# Patient Record
Sex: MALE | Race: WHITE | NOT HISPANIC OR LATINO | Employment: OTHER | URBAN - METROPOLITAN AREA
[De-identification: names, ages, dates, MRNs, and addresses within clinical notes are randomized per-mention and may not be internally consistent; named-entity substitution may affect disease eponyms.]

---

## 2017-02-06 ENCOUNTER — PATIENT MESSAGE (OUTPATIENT)
Dept: RESEARCH | Facility: HOSPITAL | Age: 57
End: 2017-02-06

## 2017-02-10 ENCOUNTER — TELEPHONE (OUTPATIENT)
Dept: PULMONOLOGY | Facility: CLINIC | Age: 57
End: 2017-02-10

## 2017-02-12 RX ORDER — ESCITALOPRAM OXALATE 20 MG/1
TABLET ORAL
Qty: 90 TABLET | Refills: 0 | Status: SHIPPED | OUTPATIENT
Start: 2017-02-12 | End: 2017-04-23 | Stop reason: SDUPTHER

## 2017-02-12 RX ORDER — BUPROPION HYDROCHLORIDE 150 MG/1
TABLET ORAL
Qty: 90 TABLET | Refills: 0 | Status: SHIPPED | OUTPATIENT
Start: 2017-02-12 | End: 2017-04-23 | Stop reason: SDUPTHER

## 2017-02-13 ENCOUNTER — OFFICE VISIT (OUTPATIENT)
Dept: PULMONOLOGY | Facility: CLINIC | Age: 57
End: 2017-02-13
Payer: OTHER GOVERNMENT

## 2017-02-13 VITALS
HEIGHT: 76 IN | BODY MASS INDEX: 36.03 KG/M2 | HEART RATE: 62 BPM | DIASTOLIC BLOOD PRESSURE: 84 MMHG | SYSTOLIC BLOOD PRESSURE: 124 MMHG | WEIGHT: 295.88 LBS | RESPIRATION RATE: 18 BRPM | OXYGEN SATURATION: 98 %

## 2017-02-13 DIAGNOSIS — G47.33 OSA (OBSTRUCTIVE SLEEP APNEA): Primary | ICD-10-CM

## 2017-02-13 PROCEDURE — 99213 OFFICE O/P EST LOW 20 MIN: CPT | Mod: PBBFAC,PO | Performed by: NURSE PRACTITIONER

## 2017-02-13 PROCEDURE — 99213 OFFICE O/P EST LOW 20 MIN: CPT | Mod: S$PBB,,, | Performed by: NURSE PRACTITIONER

## 2017-02-13 PROCEDURE — 99999 PR PBB SHADOW E&M-EST. PATIENT-LVL III: CPT | Mod: PBBFAC,,, | Performed by: NURSE PRACTITIONER

## 2017-02-13 NOTE — PROGRESS NOTES
"Subjective:      Patient ID: Gopi Garcia is a 56 y.o. male.    Chief Complaint: Sleep Apnea    HPI Comments: Presents to office for review of AutoPAP therapy. Patient states improved symptoms with use of AutoPAP. Sleeping more soundly. Waking up feeling more refreshed. Improved daytime sleepiness. Patient states he is benefiting from use of the AutoPAP.       Visit Vitals    /84    Pulse 62    Resp 18    Ht 6' 4" (1.93 m)    Wt 134.2 kg (295 lb 13.7 oz)    SpO2 98%    BMI 36.01 kg/m2     Body mass index is 36.01 kg/(m^2).    Review of Systems   Constitutional: Negative.    HENT: Negative.    Respiratory: Negative.    Cardiovascular: Negative.    Musculoskeletal: Negative.    Gastrointestinal: Negative.    Neurological: Negative.    Psychiatric/Behavioral: Negative.      Objective:      Physical Exam   Constitutional: He is oriented to person, place, and time. He appears well-developed and well-nourished.   HENT:   Head: Normocephalic and atraumatic.   Nose: Nose normal.   Mouth/Throat: Uvula is midline and oropharynx is clear and moist.   Neck: Trachea normal and normal range of motion. Neck supple. No thyroid mass and no thyromegaly present.   Cardiovascular: Normal rate, regular rhythm and normal heart sounds.    Pulmonary/Chest: Effort normal and breath sounds normal. He has no wheezes. He has no rhonchi. He has no rales. Chest wall is not dull to percussion.   Abdominal: Soft. He exhibits no mass. There is no hepatosplenomegaly or splenomegaly.   Musculoskeletal: Normal range of motion. He exhibits no edema.   Neurological: He is alert and oriented to person, place, and time.   Skin: Skin is warm and dry.   Psychiatric: He has a normal mood and affect.     Personal Diagnostic Review  Autopap download: Patient wears on average 7 hrs and 37 minutes. Greater than 4 hrs 100 % of the time. 90% percentile pressure 10 with AHI 3.4 events/hr    Assessment:       1. DASHA (obstructive sleep apnea)     "    Outpatient Encounter Prescriptions as of 2/13/2017   Medication Sig Dispense Refill    aspirin (ECOTRIN) 81 MG EC tablet Take 81 mg by mouth once daily.      atorvastatin (LIPITOR) 40 MG tablet Take 1 tablet (40 mg total) by mouth every evening. 90 tablet 2    buPROPion (WELLBUTRIN XL) 150 MG TB24 tablet TAKE 1 TABLET DAILY 90 tablet 0    clonazePAM (KLONOPIN) 1 MG tablet Take 1 tablet (1 mg total) by mouth nightly as needed for Anxiety. Prn insomnia 30 tablet 0    escitalopram oxalate (LEXAPRO) 20 MG tablet TAKE 1 TABLET DAILY 90 tablet 0    lisinopril-hydrochlorothiazide (PRINZIDE,ZESTORETIC) 20-25 mg Tab Take 1 tablet by mouth once daily. 90 tablet 0    nitroGLYCERIN (NITROSTAT) 0.4 MG SL tablet Place 1 tablet (0.4 mg total) under the tongue every 5 (five) minutes as needed for Chest pain. Don't take if using Viagra 25 tablet 0    omega-3 fatty acids-vitamin E (FISH OIL) 1,000 mg Cap Take by mouth.      sildenafil (VIAGRA) 100 MG tablet Take 1 tablet (100 mg total) by mouth daily as needed for Erectile Dysfunction (Don't take if using any nitroglycerin). 18 tablet 3    [DISCONTINUED] gabapentin (NEURONTIN) 100 MG capsule Take 400 mg by mouth 2 (two) times daily.   1    [DISCONTINUED] hydrocodone-acetaminophen 10-325mg (NORCO)  mg Tab Take 1 tablet by mouth 3 (three) times daily.  0    [DISCONTINUED] morphine (MS CONTIN) 15 MG 12 hr tablet Take 15 mg by mouth every 12 (twelve) hours.  0     No facility-administered encounter medications on file as of 2/13/2017.      No orders of the defined types were placed in this encounter.    Plan:      Doing well on PAP settings. Patient is compliant. Follow up in 12 months with PAP data download or call earlier if any problems.

## 2017-03-06 ENCOUNTER — OFFICE VISIT (OUTPATIENT)
Dept: FAMILY MEDICINE | Facility: CLINIC | Age: 57
End: 2017-03-06
Payer: OTHER GOVERNMENT

## 2017-03-06 VITALS
TEMPERATURE: 98 F | DIASTOLIC BLOOD PRESSURE: 84 MMHG | WEIGHT: 293.19 LBS | HEIGHT: 76 IN | BODY MASS INDEX: 35.7 KG/M2 | OXYGEN SATURATION: 98 % | SYSTOLIC BLOOD PRESSURE: 138 MMHG | HEART RATE: 70 BPM

## 2017-03-06 DIAGNOSIS — F32.2 MAJOR DEPRESS DIS, SEVERE: Primary | ICD-10-CM

## 2017-03-06 PROCEDURE — 90471 IMMUNIZATION ADMIN: CPT | Mod: PBBFAC,PO | Performed by: FAMILY MEDICINE

## 2017-03-06 PROCEDURE — 99213 OFFICE O/P EST LOW 20 MIN: CPT | Mod: S$PBB,,, | Performed by: FAMILY MEDICINE

## 2017-03-06 PROCEDURE — 99213 OFFICE O/P EST LOW 20 MIN: CPT | Mod: PBBFAC,PO | Performed by: FAMILY MEDICINE

## 2017-03-06 PROCEDURE — 90715 TDAP VACCINE 7 YRS/> IM: CPT | Mod: PBBFAC,PO | Performed by: FAMILY MEDICINE

## 2017-03-06 PROCEDURE — 99999 PR PBB SHADOW E&M-EST. PATIENT-LVL III: CPT | Mod: PBBFAC,,, | Performed by: FAMILY MEDICINE

## 2017-03-06 NOTE — PROGRESS NOTES
Subjective:      Patient ID: Gopi Garcia is a 56 y.o. male.    Chief Complaint: depression  HPIhe has been with a psychiatrist in the past and he has not been to one in the last 3 years.  He got a divorce in 2013.  He feels that the depression is due to unemployment (masters degree in engineering and can't find work).  He has had a lawsuit also.     Major Depressive Disorder (MDD)  The patient currently complains of a depressed mood or a loss of interest or pleasure in daily activities for more than two weeks.  Specifically this has been for several years.  This mood represents a change from the patient's baseline and it has impaired function in the patient's social, occupational, and educational life.  A review of other significant questions include:  1. Depressed mood or irritable most of the day, nearly every day, as indicated by either subjective report (e.g., feels sad or empty) or observation made by others (e.g., appears tearful). Yes   2. Decreased interest or pleasure in most activities, most of each day Yes   3. Significant weight change (5%) or change in appetite Yes   4. Change in sleep: Insomnia or hypersomnia Yes   5. Change in activity: Psychomotor agitation or retardation Yes   6. Fatigue or loss of energy Yes   7. Guilt/worthlessness: Feelings of worthlessness or excessive or inappropriate guilt Yes   8. Concentration: diminished ability to think or concentrate, or more indecisiveness Yes   9. Suicidality: Thoughts of death or suicide, or has suicide plan No   Total Additional Symptoms 8     Depressive Episode Criteria (may be part of Major Depressive Disorder OR an isolated episode)   A    Depressed Mood Yes   Loss of interest and enjoyment in usual activities Yes   Reduced energy and decreased activity Yes   B    Reduced self esteem and confidence Yes   Ideas of guilt and unworthiness Yes   Pessimistic thoughts Yes   Disturbed sleep Yes   Diminished appetite Yes   Ideas of self harm No  "      He is currently on wellbutrin and lexapro which he states have helped him with this.  HOwever, he has some increased symptoms lately.   He has been on intermittent klonopin for anxiety but the depression is the overwhelming issue.  He uses about 1 klonopin a month.         Review of Systems    Objective:   /84  Pulse 70  Temp 98.3 °F (36.8 °C) (Oral)   Ht 6' 4" (1.93 m)  Wt 133 kg (293 lb 3.4 oz)  SpO2 98%  BMI 35.69 kg/m2    Physical Exam   Psychiatric: His mood appears not anxious. His affect is not angry, not blunt, not labile and not inappropriate. His speech is not rapid and/or pressured, not delayed, not tangential and not slurred. He is not agitated, not aggressive, not slowed, not actively hallucinating and not combative. Thought content is not paranoid and not delusional. Cognition and memory are not impaired. He does not express impulsivity or inappropriate judgment. He exhibits a depressed mood. He expresses no homicidal and no suicidal ideation. He expresses no suicidal plans and no homicidal plans. He is communicative. He exhibits normal recent memory and normal remote memory. He is attentive.       Assessment:     1. Major depress dis, severe        Plan:   Diagnoses and all orders for this visit:    Major depress dis, severe  -     Ambulatory referral to Psychiatry    Other orders  -     Tdap Vaccine    F/U with his PCP as needed.      "

## 2017-03-06 NOTE — MR AVS SNAPSHOT
[facility]  [address] [address]  Phone: [phone]  Fax: [phone]                  Gopi Garcia   3/6/2017 8:40 AM   Office Visit    Description:  Male : 1960   Provider:  Carlito Tong MD   Department:  [facility]           Diagnoses this Visit        Comments    Major depress dis, severe    -  Primary            To Do List           Future Appointments        Provider Department Dept Phone    2018 10:40 AM Elizabeth Lejeune, NP [facility] [phone]      Goals (5 Years of Data)     None      [facility]     [facility] -  Assistance  Registered nurses in the [facility] Center provide clinical advisement, health education, appointment booking, and other advisory services.  Call for this free service at [phone].             Medications           Message regarding Medications     Verify the changes and/or additions to your medication regime listed below are the same as discussed with your clinician today.  If any of these changes or additions are incorrect, please notify your healthcare provider.             Verify that the below list of medications is an accurate representation of the medications you are currently taking.  If none reported, the list may be blank. If incorrect, please contact your healthcare provider. Carry this list with you in case of emergency.           Current Medications     aspirin (ECOTRIN) 81 MG EC tablet Take 81 mg by mouth once daily.    atorvastatin (LIPITOR) 40 MG tablet Take 1 tablet (40 mg total) by mouth every evening.    buPROPion (WELLBUTRIN XL) 150 MG TB24 tablet TAKE 1 TABLET DAILY    clonazePAM (KLONOPIN) 1 MG tablet Take 1 tablet (1 mg total) by mouth nightly as needed for Anxiety. Prn insomnia    escitalopram oxalate (LEXAPRO) 20 MG tablet TAKE 1 TABLET DAILY    lisinopril-hydrochlorothiazide (PRINZIDE,ZESTORETIC) 20-25 mg Tab Take 1  "tablet by mouth once daily.    nitroGLYCERIN (NITROSTAT) 0.4 MG SL tablet Place 1 tablet (0.4 mg total) under the tongue every 5 (five) minutes as needed for Chest pain. Don't take if using Viagra    omega-3 fatty acids-vitamin E (FISH OIL) 1,000 mg Cap Take by mouth.    sildenafil (VIAGRA) 100 MG tablet Take 1 tablet (100 mg total) by mouth daily as needed for Erectile Dysfunction (Don't take if using any nitroglycerin).           Clinical Reference Information           Your Vitals Were     BP Pulse Temp Height Weight SpO2    138/84 70 98.3 °F (36.8 °C) (Oral) 6' 4" (1.93 m) 133 kg (293 lb 3.4 oz) 98%    BMI                35.69 kg/m2          Blood Pressure          Most Recent Value    BP  138/84      Allergies as of 3/6/2017     No Known Allergies      Immunizations Administered on Date of Encounter - 3/6/2017     Name Date Dose VIS Date Route    TDAP  Incomplete 0.5 mL 2/24/2015 Intramuscular      Orders Placed During Today's Visit      Normal Orders This Visit    Ambulatory referral to Psychiatry     Tdap Vaccine       Language Assistance Services     ATTENTION: Language assistance services are available, free of charge. Please call [phone].      ATENCIÓN: Si habla mark, tiene a trevino disposición servicios gratuitos de asistencia lingüística. Llame al [phone].     [facility] Ý: N?u b?n nói Ti?ng Vi?t, có các d?ch v? h? tr? ngôn ng? mi?n phí dành cho b?n. G?i s? [phone].         [facility] complies with applicable Federal civil rights laws and does not discriminate on the basis of race, color, national origin, age, disability, or sex.        "

## 2017-03-07 ENCOUNTER — TELEPHONE (OUTPATIENT)
Dept: FAMILY MEDICINE | Facility: CLINIC | Age: 57
End: 2017-03-07

## 2017-03-07 DIAGNOSIS — F32.2 MAJOR DEPRESSIVE DISORDER, SEVERE: Primary | ICD-10-CM

## 2017-03-07 NOTE — TELEPHONE ENCOUNTER
----- Message from Aziza Kaur sent at 3/7/2017  3:05 PM CST -----  Contact: wife - Ayde  Pt needs the referral for psychiatry re-issued to a different provider for insurance reasons.  Please send it to Dr Reinoso,  n #[phone]  fax #[phone].  Please call Ms Ayde at [phone] to confirm

## 2017-03-10 NOTE — TELEPHONE ENCOUNTER
I have signed for the following orders AND/OR meds.  Please call the patient and ask the patient to schedule the testing AND/OR inform about any medications that were sent.      Orders Placed This Encounter   Procedures    Ambulatory referral to Psychiatry     Referral Priority:   Routine     Referral Type:   Psychiatric     Referral Reason:   Specialty Services Required     Referred to Provider:   Casey Juan MD     Requested Specialty:   Psychiatry     Number of Visits Requested:   1

## 2017-04-24 RX ORDER — ESCITALOPRAM OXALATE 20 MG/1
TABLET ORAL
Qty: 90 TABLET | Refills: 0 | Status: SHIPPED | OUTPATIENT
Start: 2017-04-24 | End: 2017-06-07

## 2017-04-24 RX ORDER — BUPROPION HYDROCHLORIDE 150 MG/1
TABLET ORAL
Qty: 90 TABLET | Refills: 0 | Status: SHIPPED | OUTPATIENT
Start: 2017-04-24 | End: 2017-05-10 | Stop reason: SDUPTHER

## 2017-05-02 ENCOUNTER — PATIENT MESSAGE (OUTPATIENT)
Dept: PULMONOLOGY | Facility: CLINIC | Age: 57
End: 2017-05-02

## 2017-05-10 ENCOUNTER — INITIAL CONSULT (OUTPATIENT)
Dept: PSYCHIATRY | Facility: CLINIC | Age: 57
End: 2017-05-10
Payer: OTHER GOVERNMENT

## 2017-05-10 DIAGNOSIS — G47.00 INSOMNIA, UNSPECIFIED TYPE: ICD-10-CM

## 2017-05-10 DIAGNOSIS — F33.1 MODERATE EPISODE OF RECURRENT MAJOR DEPRESSIVE DISORDER: Primary | ICD-10-CM

## 2017-05-10 DIAGNOSIS — F41.9 ANXIETY: ICD-10-CM

## 2017-05-10 PROCEDURE — 90792 PSYCH DIAG EVAL W/MED SRVCS: CPT | Mod: S$PBB,,, | Performed by: PSYCHIATRY & NEUROLOGY

## 2017-05-10 PROCEDURE — 90792 PSYCH DIAG EVAL W/MED SRVCS: CPT | Mod: PBBFAC,PO | Performed by: PSYCHIATRY & NEUROLOGY

## 2017-05-10 RX ORDER — CLONAZEPAM 1 MG/1
1 TABLET ORAL NIGHTLY PRN
Qty: 60 TABLET | Refills: 1 | Status: SHIPPED | OUTPATIENT
Start: 2017-05-10 | End: 2020-07-22

## 2017-05-10 RX ORDER — BUPROPION HYDROCHLORIDE 150 MG/1
300 TABLET ORAL DAILY
Qty: 60 TABLET | Refills: 1 | Status: SHIPPED | OUTPATIENT
Start: 2017-05-10 | End: 2017-07-24 | Stop reason: SDUPTHER

## 2017-05-10 RX ORDER — ESCITALOPRAM OXALATE 10 MG/1
10 TABLET ORAL DAILY
COMMUNITY
End: 2017-07-24 | Stop reason: SDUPTHER

## 2017-05-10 NOTE — PROGRESS NOTES
Outpatient Psychiatry Initial Visit (MD/NP)    5/10/2017    Gopi Garcia, a 56 y.o. male, presenting for initial evaluation visit. Met with patient.    Reason for Encounter: Referral from Dr. Tong. Patient complains of hx of depression.    History of Present Illness:     Mr. Garcia is a 55 y/o M with ~10 year history of depression, with symptoms beginning following MI in '07. Saw psychiatrist, has taken antidepressants (bupropion and/or escitalopram) most of the time since then with some benefit. Has experienced significant adversity due to financial, legal, occupation, health problems since then. Takes clonazepam 3-4x/year for intense anxiety brought on by especially difficult circumstances. Adherence with medication is good, mostly tolerates the medication, but experiences some ED.     From Dr. Tong's note (March '17):     The patient currently complains of a depressed mood or a loss of interest or pleasure in daily activities for more than two weeks. Specifically this has been for several years. This mood represents a change from the patient's baseline and it has impaired function in the patient's social, occupational, and educational life. A review of other significant questions include: [+SIGECAPS x suicidal thoughts.]     MedHx: MI at age 44, CAD; TBI, unconscious x 15 minutes - chronic back pain since. Takes oxycodone; weight gain (30 pounds overweight), eats more not working. Lower back pain, HTN, high cholesterol.   Psych Hx: denies symptoms prior to ~10 years ago. First recalls symptoms of depression, anxiety, somatic symptoms including tachycardia, palpitations following MI, believing they were all heart related, later suspected by cardiologist to be anxious in nature. No hx of willie, psychosis, self-harm behavior, psych hospitalizations[place] Clonazepam - 3-4x/year.   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 Ongoing legal dispute with ex-wife. , remarried; [employer] (served 20+years). Hobbies - restoring "[employer] guitars. Has master's in engineering. Continues to Look for job - out of work x 3 years; Masters degree.   FamHx: mat. Gf & aunt - suicide; dad alcoholic, se  Substance - drink/month, no illicits, no prescription misuse;   All: NKDA      Review Of Systems:     GENERAL:  No weight gain or loss  SKIN:  No rashes or lacerations  HEAD:  No headaches  EYES:  No exophthalmos, jaundice or blindness  EARS:  No dizziness, tinnitus or hearing loss  NOSE:  No changes in smell  MOUTH & THROAT:  No dyskinetic movements or obvious goiter  CHEST:  No shortness of breath, hyperventilation or cough  CARDIOVASCULAR:  No tachycardia or chest pain  ABDOMEN:  No nausea, vomiting, pain, constipation or diarrhea  URINARY:  No frequency, dysuria or sexual dysfunction  ENDOCRINE:  No polydipsia, polyuria  MUSCULOSKELETAL:  No pain or stiffness of the joints  NEUROLOGIC:  No weakness, sensory changes, seizures, confusion, memory loss, tremor or other abnormal movements    Current Evaluation:     Nutritional Screening: Considering the patient's height and weight, medications, medical history and preferences, should a referral be made to the dietitian? no    Constitutional  Vitals:  Most recent vital signs, dated less than 90 days prior to this appointment, were not reviewed.    There were no vitals filed for this visit.     General:  unremarkable, age appropriate     Musculoskeletal  Muscle Strength/Tone:  no tremor, no tic   Gait & Station:  non-ataxic     Psychiatric  Appearance: casually dressed & groomed;   Behavior: calm,   Cooperation: cooperative with assessment  Speech: normal rate, volume, tone  Thought Process: linear, goal-directed  Thought Content: No  suicidal or homicidal ideation; no delusions  Affect: depressed  Mood: depressed  Perceptions: No auditory or visual hallucinations  Level of Consciousness: alert throughout interview  Insight: fair  Cognition: Oriented to person, place, time, & situation  Memory: no apparent deficits to general clinical interview; not formally assessed  Attention/Concentration: no apparent deficits to general clinical interview; not formally assessed  Fund of Knowledge: average by vocabulary/education    Laboratory Data  No visits with results within 1 Month(s) from this visit.  Latest known visit with results is:    Lab Visit on 12/16/2016   Component Date Value Ref Range Status    Cholesterol 12/16/2016 150  120 - 199 mg/dL Final    Triglycerides 12/16/2016 149  30 - 150 mg/dL Final    HDL 12/16/2016 51  40 - 75 mg/dL Final    LDL Cholesterol 12/16/2016 69.2  63.0 - 159.0 mg/dL Final    HDL/Chol Ratio 12/16/2016 34.0  20.0 - 50.0 % Final    Total Cholesterol/HDL Ratio 12/16/2016 2.9  2.0 - 5.0 Final    Non-HDL Cholesterol 12/16/2016 99  mg/dL Final    Sodium 12/16/2016 142  136 - 145 mmol/L Final    Potassium 12/16/2016 3.9  3.5 - 5.1 mmol/L Final    Chloride 12/16/2016 103  95 - 110 mmol/L Final    CO2 12/16/2016 30* 23 - 29 mmol/L Final    Glucose 12/16/2016 93  70 - 110 mg/dL Final    BUN, Bld 12/16/2016 17  6 - 20 mg/dL Final    Creatinine 12/16/2016 1.1  0.5 - 1.4 mg/dL Final    Calcium 12/16/2016 8.9  8.7 - 10.5 mg/dL Final    Total Protein 12/16/2016 6.4  6.0 - 8.4 g/dL Final    Albumin 12/16/2016 3.7  3.5 - 5.2 g/dL Final    Total Bilirubin 12/16/2016 1.0  0.1 - 1.0 mg/dL Final    Alkaline Phosphatase 12/16/2016 70  55 - 135 U/L Final    AST 12/16/2016 18  10 - 40 U/L Final    ALT 12/16/2016 14  10 - 44 U/L Final    Anion Gap 12/16/2016 9  8 - 16 mmol/L Final    eGFR if African American 12/16/2016 >60.0  >60 mL/min/1.73 m^2 Final    eGFR if non African American 12/16/2016 >60.0  >60 mL/min/1.73 m^2  Final     Medications  Outpatient Encounter Prescriptions as of 5/10/2017   Medication Sig Dispense Refill    aspirin (ECOTRIN) 81 MG EC tablet Take 81 mg by mouth once daily.      atorvastatin (LIPITOR) 40 MG tablet Take 1 tablet (40 mg total) by mouth every evening. 90 tablet 2    buPROPion (WELLBUTRIN XL) 150 MG TB24 tablet TAKE 1 TABLET DAILY 90 tablet 0    clonazePAM (KLONOPIN) 1 MG tablet Take 1 tablet (1 mg total) by mouth nightly as needed for Anxiety. Prn insomnia 30 tablet 0    escitalopram oxalate (LEXAPRO) 20 MG tablet TAKE 1 TABLET DAILY 90 tablet 0    lisinopril-hydrochlorothiazide (PRINZIDE,ZESTORETIC) 20-25 mg Tab Take 1 tablet by mouth once daily. 90 tablet 0    nitroGLYCERIN (NITROSTAT) 0.4 MG SL tablet Place 1 tablet (0.4 mg total) under the tongue every 5 (five) minutes as needed for Chest pain. Don't take if using Viagra 25 tablet 0    omega-3 fatty acids-vitamin E (FISH OIL) 1,000 mg Cap Take by mouth.      sildenafil (VIAGRA) 100 MG tablet Take 1 tablet (100 mg total) by mouth daily as needed for Erectile Dysfunction (Don't take if using any nitroglycerin). 18 tablet 3     No facility-administered encounter medications on file as of 5/10/2017.      Assessment - Diagnosis - Goals:     Impression: 55 y/o WM with MDD, recurrent, moderate with associated anxiety. Has gotten benefit from escitalopram + wellbutrin combination & more recently addition of clonazepam for sleep. Tolerating medications well x some ED.     Dx: MDD, recurrent, moderate    Treatment Goals:  Specify outcomes written in observable, behavioral terms:   Reduce depression by self-report questionnaire    Treatment Plan/Recommendations:   · Start bupropion, taper escitalopram over several days.    · psychoeducation regarding behavioral activation, benefits of psychotherapy. He'll consider therapy.     Return to Clinic: 6 weeks    Counseling time: 10 minutes  Total time: 50 minutes    MARITZA Ramirez MD

## 2017-05-22 RX ORDER — LISINOPRIL AND HYDROCHLOROTHIAZIDE 20; 25 MG/1; MG/1
TABLET ORAL
Qty: 90 TABLET | Refills: 3 | Status: SHIPPED | OUTPATIENT
Start: 2017-05-22 | End: 2017-08-28 | Stop reason: SDUPTHER

## 2017-06-02 ENCOUNTER — TELEPHONE (OUTPATIENT)
Dept: FAMILY MEDICINE | Facility: CLINIC | Age: 57
End: 2017-06-02

## 2017-06-02 NOTE — TELEPHONE ENCOUNTER
spoke to [place], advised OV needed as patient not seen by Dr Spencer since 03/2016, she will inform patient to call for appt.

## 2017-06-02 NOTE — TELEPHONE ENCOUNTER
----- Message from Tiara Pettit sent at 6/2/2017  9:26 AM CDT -----  Contact: [id number] johnothan [phone]  Patient will need a new eval and treat to continue treatment at [facility]. Patient has upcoming appointments in June. Ms reeves will be sending a fax of information needed  for patient.Please call back at [phone].      Thanks,  Tiara Pettit

## 2017-06-07 ENCOUNTER — LAB VISIT (OUTPATIENT)
Dept: LAB | Facility: HOSPITAL | Age: 57
End: 2017-06-07
Attending: FAMILY MEDICINE
Payer: OTHER GOVERNMENT

## 2017-06-07 ENCOUNTER — OFFICE VISIT (OUTPATIENT)
Dept: FAMILY MEDICINE | Facility: CLINIC | Age: 57
End: 2017-06-07
Payer: OTHER GOVERNMENT

## 2017-06-07 VITALS
SYSTOLIC BLOOD PRESSURE: 118 MMHG | BODY MASS INDEX: 37.11 KG/M2 | WEIGHT: 289.13 LBS | TEMPERATURE: 98 F | HEIGHT: 74 IN | HEART RATE: 68 BPM | DIASTOLIC BLOOD PRESSURE: 74 MMHG

## 2017-06-07 DIAGNOSIS — Z11.59 NEED FOR HEPATITIS C SCREENING TEST: ICD-10-CM

## 2017-06-07 DIAGNOSIS — G89.29 CHRONIC THORACIC BACK PAIN, UNSPECIFIED BACK PAIN LATERALITY: Primary | ICD-10-CM

## 2017-06-07 DIAGNOSIS — M54.6 CHRONIC THORACIC BACK PAIN, UNSPECIFIED BACK PAIN LATERALITY: Primary | ICD-10-CM

## 2017-06-07 DIAGNOSIS — M54.50 CHRONIC BILATERAL LOW BACK PAIN WITHOUT SCIATICA: ICD-10-CM

## 2017-06-07 DIAGNOSIS — Z12.5 ENCOUNTER FOR SCREENING FOR MALIGNANT NEOPLASM OF PROSTATE: ICD-10-CM

## 2017-06-07 DIAGNOSIS — G89.29 CHRONIC BILATERAL LOW BACK PAIN WITHOUT SCIATICA: ICD-10-CM

## 2017-06-07 LAB — HCV AB SERPL QL IA: NEGATIVE

## 2017-06-07 PROCEDURE — 99999 PR PBB SHADOW E&M-EST. PATIENT-LVL III: CPT | Mod: PBBFAC,,, | Performed by: FAMILY MEDICINE

## 2017-06-07 PROCEDURE — 36415 COLL VENOUS BLD VENIPUNCTURE: CPT | Mod: PO

## 2017-06-07 PROCEDURE — 99396 PREV VISIT EST AGE 40-64: CPT | Mod: S$PBB,,, | Performed by: FAMILY MEDICINE

## 2017-06-07 PROCEDURE — 86803 HEPATITIS C AB TEST: CPT

## 2017-06-07 PROCEDURE — 84153 ASSAY OF PSA TOTAL: CPT

## 2017-06-07 PROCEDURE — 99213 OFFICE O/P EST LOW 20 MIN: CPT | Mod: PBBFAC,PO | Performed by: FAMILY MEDICINE

## 2017-06-07 RX ORDER — OXYCODONE AND ACETAMINOPHEN 10; 325 MG/1; MG/1
1 TABLET ORAL 4 TIMES DAILY
Refills: 0 | COMMUNITY
Start: 2017-05-18

## 2017-06-07 RX ORDER — CETIRIZINE HYDROCHLORIDE 10 MG/1
TABLET ORAL
COMMUNITY
Start: 2017-05-25 | End: 2019-12-16

## 2017-06-08 LAB — COMPLEXED PSA SERPL-MCNC: 0.45 NG/ML

## 2017-06-08 NOTE — PROGRESS NOTES
Physical exam, patient needs referral for his pain management doctor.  Pending rhizotomy  for thoracic back pain.  Chronic low back pain as well on chronic opioids through pain management.    Past Medical History:  Past Medical History:   Diagnosis Date    Anxiety     Chronic low back pain     Colon polyp 2010    told to repeat 5 yrs    Coronary artery disease     Depression     ED (erectile dysfunction)     HTN (hypertension)     Hyperlipidemia     Myocardial infarct, old     Sleep apnea      Past Surgical History:   Procedure Laterality Date    CARDIAC CATHETERIZATION  2006    INGUINAL HERNIA REPAIR Right 2011    VASECTOMY  2005     Social History     Social History    Marital status:      Spouse name: N/A    Number of children: N/A    Years of education: N/A     Occupational History    Not on file.     Social History Main Topics    Smoking status: Former Smoker    Smokeless tobacco: Never Used    Alcohol use 3.6 oz/week     6 Cans of beer per week    Drug use: Unknown    Sexual activity: Not on file     Other Topics Concern    Not on file     Social History Narrative    No narrative on file     Family History   Problem Relation Age of Onset    Lung cancer Father     Alzheimer's disease Mother     Heart attack Paternal Grandfather     Prostate cancer Brother      Review of patient's allergies indicates:  No Known Allergies  Current Outpatient Prescriptions on File Prior to Visit   Medication Sig Dispense Refill    aspirin (ECOTRIN) 81 MG EC tablet Take 81 mg by mouth once daily.      atorvastatin (LIPITOR) 40 MG tablet Take 1 tablet (40 mg total) by mouth every evening. 90 tablet 2    buPROPion (WELLBUTRIN XL) 150 MG TB24 tablet Take 2 tablets (300 mg total) by mouth once daily. 60 tablet 1    clonazePAM (KLONOPIN) 1 MG tablet Take 1 tablet (1 mg total) by mouth nightly as needed for Anxiety. Prn insomnia 60 tablet 1    escitalopram oxalate (LEXAPRO) 10 MG tablet Take 10 mg  "by mouth once daily.      lisinopril-hydrochlorothiazide (PRINZIDE,ZESTORETIC) 20-25 mg Tab TAKE 1 TABLET DAILY 90 tablet 3    nitroGLYCERIN (NITROSTAT) 0.4 MG SL tablet Place 1 tablet (0.4 mg total) under the tongue every 5 (five) minutes as needed for Chest pain. Don't take if using Viagra 25 tablet 0    omega-3 fatty acids-vitamin E (FISH OIL) 1,000 mg Cap Take by mouth.      sildenafil (VIAGRA) 100 MG tablet Take 1 tablet (100 mg total) by mouth daily as needed for Erectile Dysfunction (Don't take if using any nitroglycerin). 18 tablet 3    [DISCONTINUED] escitalopram oxalate (LEXAPRO) 20 MG tablet TAKE 1 TABLET DAILY 90 tablet 0     No current facility-administered medications on file prior to visit.          ROS:  GENERAL: No fever, chills,  or significant weight changes.  HEENT: No headache or hearing complaints.  No dysphagia  Eyes: No vision complaints  CHEST: Denies ROMANO, cyanosis, wheezing, cough and sputum production.  CARDIOVASCULAR: Denies chest pain, PND, orthopnea or reduced exercise tolerance.  ABDOMEN: Appetite fine. Denies diarrhea, abdominal pain, hematemesis or blood in stool.  URINARY: No flank pain, dysuria or hematuria.  MUSCULOSKELETAL: As above  NEUROLOGIC: No focal weakness .  PSYCHIATRIC: Denies complaint        OBJECTIVE:     Vitals:    06/07/17 1053   BP: 118/74   Pulse: 68   Temp: 98 °F (36.7 °C)   Weight: 131.1 kg (289 lb 2.1 oz)   Height: 6' 2" (1.88 m)     Wt Readings from Last 3 Encounters:   06/07/17 131.1 kg (289 lb 2.1 oz)   03/06/17 133 kg (293 lb 3.4 oz)   02/13/17 134.2 kg (295 lb 13.7 oz)     APPEARANCE: Well nourished, well developed, in no acute distress.    HEAD: Normocephalic.  Atraumatic.  No sinus tenderness.  EYES:   Right eye: Pupil reactive.  Conjunctiva clear.    Left eye: Pupil reactive.  Conjunctiva clear.    Both fundi:  Grossly normal to nondilated exam. EOMI.    EARS: TM's intact. Light reflex normal. No retraction or perforation.    NOSE:  clear.  MOUTH & " THROAT:  No pharyngeal erythema or exudate. No lesions.  NECK: Supple. No bruits.  No JVD.  No cervical lymphadenopathy.  No thyromegaly.    CHEST: Breath sounds clear bilaterally.  Normal respiratory effort  CARDIOVASCULAR: Normal rate.  Regular rhythm.  No murmurs.  No rub.  No gallops.  ABDOMEN: Bowel sounds normal.  Soft.  No tenderness.  No organomegaly.  PERIPHERAL VASCULAR: No cyanosis.  No clubbing.  No edema.  NEUROLOGIC: No focal findings.  MENTAL STATUS: Alert.  Oriented x 3.        Gopi was seen today for annual exam.    Diagnoses and all orders for this visit:    Chronic thoracic back pain, unspecified back pain laterality  -     Ambulatory referral to Pain Clinic    Chronic bilateral low back pain without sciatica  -     Ambulatory referral to Pain Clinic    Need for hepatitis C screening test  -     Hepatitis C antibody; Future    Encounter for screening for malignant neoplasm of prostate  -     PSA, Screening; Future    Anticipatory guidance: Don't smoke.  Healthy diet and regular exercise recommended..  Reviewed most recent laboratory

## 2017-06-09 ENCOUNTER — TELEPHONE (OUTPATIENT)
Dept: FAMILY MEDICINE | Facility: CLINIC | Age: 57
End: 2017-06-09

## 2017-06-09 NOTE — TELEPHONE ENCOUNTER
----- Message from Catherine Pepper sent at 6/9/2017  1:48 PM CDT -----  Contact: [facility], Dr. Lindsay  [facility], Dr. Lindsay [phone],,,  Calling to get something added to a referral that was sent over to there office,,, they needed the office to go into the computer the neck pain added to the referral and diagnose code,,they need it done today or they will have to cancel procedure for Monday,,, please call them back

## 2017-06-09 NOTE — TELEPHONE ENCOUNTER
Spoke to Lexis in the referral department, and they will manually input the diagnosis to the existing referral. [facility] to notify that the procedure can still be done on Monday.

## 2017-06-16 ENCOUNTER — OFFICE VISIT (OUTPATIENT)
Dept: CARDIOLOGY | Facility: CLINIC | Age: 57
End: 2017-06-16
Payer: OTHER GOVERNMENT

## 2017-06-16 VITALS
SYSTOLIC BLOOD PRESSURE: 135 MMHG | DIASTOLIC BLOOD PRESSURE: 83 MMHG | BODY MASS INDEX: 35.42 KG/M2 | HEIGHT: 76 IN | HEART RATE: 92 BPM | WEIGHT: 290.88 LBS

## 2017-06-16 DIAGNOSIS — I10 ESSENTIAL HYPERTENSION: ICD-10-CM

## 2017-06-16 DIAGNOSIS — I25.10 MILD CAD: Primary | ICD-10-CM

## 2017-06-16 DIAGNOSIS — G47.33 OBSTRUCTIVE SLEEP APNEA SYNDROME: ICD-10-CM

## 2017-06-16 DIAGNOSIS — E78.5 HYPERLIPIDEMIA, UNSPECIFIED HYPERLIPIDEMIA TYPE: ICD-10-CM

## 2017-06-16 DIAGNOSIS — I25.2 MYOCARDIAL INFARCT, OLD: ICD-10-CM

## 2017-06-16 PROCEDURE — 99999 PR PBB SHADOW E&M-EST. PATIENT-LVL III: CPT | Mod: PBBFAC,,, | Performed by: NURSE PRACTITIONER

## 2017-06-16 PROCEDURE — 99213 OFFICE O/P EST LOW 20 MIN: CPT | Mod: PBBFAC,PO | Performed by: NURSE PRACTITIONER

## 2017-06-16 PROCEDURE — 99213 OFFICE O/P EST LOW 20 MIN: CPT | Mod: S$PBB,,, | Performed by: NURSE PRACTITIONER

## 2017-06-16 RX ORDER — NITROGLYCERIN 0.4 MG/1
0.4 TABLET SUBLINGUAL EVERY 5 MIN PRN
Qty: 25 TABLET | Refills: 5 | Status: SHIPPED | OUTPATIENT
Start: 2017-06-16 | End: 2019-03-08 | Stop reason: SDUPTHER

## 2017-06-16 NOTE — PROGRESS NOTES
"Subjective:    Patient ID:  Gopi Garcia is a 56 y.o. male who presents for follow-up of Follow-up      HPI: Mr. Gopi Garcia presents to the clinic for follow up of CAD, HTN, HLP. he stated that he is doing very well; he denied any chest pain or SOB.  DASHA: on CPAP every night.    Hx of CAD (no stent), HTN, HLP, obesity, DASHA.    He moved to [place] from [place] in 12/2014. He then started [facility] care.    He states he had MI in 2006 and went to [facility], had C and was told "nonobstructive 30%".    10/9/2015 2D echo normal LVEF.    Medications: he is not missing any doses.  Sodium: he does not add salt to foods,  he is reading labels for sodium content, as well as fat content.  Dietary Fats: Fried foods once a week.  Dietary Sugars: One can of coke per day.  Exercise: he walks on treadmill for 20 minutes at a time, 5 days a week.  Tobacco:Quit tobacco in 2006; does chew nicotine gum. occasional alcohol.     Weight: 131.9 kg (290 lb 14.4 oz) he states that his daily weights has been stable  Wt Readings from Last 3 Encounters:   06/16/17 131.9 kg (290 lb 14.4 oz)   06/07/17 131.1 kg (289 lb 2.1 oz)   03/06/17 133 kg (293 lb 3.4 oz)     BP log: None.     Review of Systems   Constitution: Negative for chills, decreased appetite, fever, night sweats, weight gain and weight loss.   HENT: Negative for congestion.    Cardiovascular: Negative for chest pain, claudication, cyanosis, dyspnea on exertion, irregular heartbeat, leg swelling, near-syncope, orthopnea, palpitations, paroxysmal nocturnal dyspnea and syncope.   Respiratory: Negative for cough, hemoptysis, shortness of breath, sputum production and wheezing.    Hematologic/Lymphatic: Negative for adenopathy and bleeding problem. Bruises/bleeds easily (bruises easily).   Skin: Negative for color change and nail changes.   Gastrointestinal: Negative for bloating, abdominal pain, change in bowel habit, heartburn, hematochezia, melena, " nausea and vomiting.   Genitourinary: Negative for hematuria.   Neurological: Negative for dizziness and light-headedness.   Psychiatric/Behavioral: Negative for altered mental status.       Objective:   Physical Exam   Constitutional: He is oriented to person, place, and time. He appears well-developed and well-nourished. No distress.   HENT:   Head: Normocephalic and atraumatic.   Eyes: Conjunctivae are normal. No scleral icterus.   Neck: Neck supple. No JVD present. No tracheal deviation present. No thyromegaly present.   Cardiovascular: Normal rate, regular rhythm, normal heart sounds and intact distal pulses.  Exam reveals no gallop and no friction rub.    No murmur heard.  Pulmonary/Chest: Effort normal and breath sounds normal. No respiratory distress. He has no wheezes. He has no rales. He exhibits no tenderness.   Abdominal: Soft. Bowel sounds are normal. He exhibits no distension and no mass. There is no tenderness. There is no rebound and no guarding.   Musculoskeletal: Normal range of motion. He exhibits no edema.   Lymphadenopathy:     He has no cervical adenopathy.   Neurological: He is alert and oriented to person, place, and time.   Skin: Skin is warm and dry. No rash noted. He is not diaphoretic. No erythema. No pallor.   Pink   Psychiatric: He has a normal mood and affect.   Results for ISAAC DHILLON (MRN [id number]) as of 6/16/2017 09:23   Ref. Range 12/16/2016 08:38   Sodium Latest Ref Range: 136 - 145 mmol/L 142   Potassium Latest Ref Range: 3.5 - 5.1 mmol/L 3.9   Chloride Latest Ref Range: 95 - 110 mmol/L 103   CO2 Latest Ref Range: 23 - 29 mmol/L 30 (H)   Anion Gap Latest Ref Range: 8 - 16 mmol/L 9   BUN, Bld Latest Ref Range: 6 - 20 mg/dL 17   Creatinine Latest Ref Range: 0.5 - 1.4 mg/dL 1.1   eGFR if non African American Latest Ref Range: >60 mL/min/1.73 m^2 >60.0   eGFR if African American Latest Ref Range: >60 mL/min/1.73 m^2 >60.0   Glucose Latest Ref Range: 70 - 110 mg/dL 93    Calcium Latest Ref Range: 8.7 - 10.5 mg/dL 8.9   Alkaline Phosphatase Latest Ref Range: 55 - 135 U/L 70   Total Protein Latest Ref Range: 6.0 - 8.4 g/dL 6.4   Albumin Latest Ref Range: 3.5 - 5.2 g/dL 3.7   Total Bilirubin Latest Ref Range: 0.1 - 1.0 mg/dL 1.0   AST Latest Ref Range: 10 - 40 U/L 18   ALT Latest Ref Range: 10 - 44 U/L 14   Triglycerides Latest Ref Range: 30 - 150 mg/dL 149   Cholesterol Latest Ref Range: 120 - 199 mg/dL 150   HDL Latest Ref Range: 40 - 75 mg/dL 51   LDL Cholesterol Latest Ref Range: 63.0 - 159.0 mg/dL 69.2   Total Cholesterol/HDL Ratio Latest Ref Range: 2.0 - 5.0  2.9        Assessment:      1. Mild CAD    2. Myocardial infarct, old    3. Essential hypertension    4. Hyperlipidemia, unspecified hyperlipidemia type    5. BMI 35.0-35.9,adult    6. Obstructive sleep apnea syndrome        Plan:   Reveiwed lab results from 12/16 with him. Lipids at goal.  Continue current medications as directed. Refilled nitroglycerin and discussed interaction with Viagra. He verbalized his understanding.  Sodium restriction.  Whole foods diet. Reduce intake of hidden sugars-read labels.  Monitor BP at home.  Continue Exercise  Continue CPAP every night.  Follow up in 6 months or call sooner for any problems.

## 2017-07-03 ENCOUNTER — TELEPHONE (OUTPATIENT)
Dept: PSYCHIATRY | Facility: CLINIC | Age: 57
End: 2017-07-03

## 2017-07-21 ENCOUNTER — TELEPHONE (OUTPATIENT)
Dept: FAMILY MEDICINE | Facility: CLINIC | Age: 57
End: 2017-07-21

## 2017-07-21 DIAGNOSIS — D22.9 NUMEROUS MOLES: ICD-10-CM

## 2017-07-21 DIAGNOSIS — L91.8 SKIN TAG: Primary | ICD-10-CM

## 2017-07-21 NOTE — TELEPHONE ENCOUNTER
----- Message from Delano Nguyễn sent at 7/21/2017  2:29 PM CDT -----  Pt is requesting a call from nurse to discuss a referral for a dermatology.          Please call pt back at [phone]

## 2017-07-24 ENCOUNTER — OFFICE VISIT (OUTPATIENT)
Dept: DERMATOLOGY | Facility: CLINIC | Age: 57
End: 2017-07-24
Payer: OTHER GOVERNMENT

## 2017-07-24 DIAGNOSIS — D18.00 ANGIOMA: ICD-10-CM

## 2017-07-24 DIAGNOSIS — L91.8 ACROCHORDON: ICD-10-CM

## 2017-07-24 DIAGNOSIS — L82.1 SEBORRHEIC KERATOSIS: ICD-10-CM

## 2017-07-24 DIAGNOSIS — D22.9 MULTIPLE NEVI: Primary | ICD-10-CM

## 2017-07-24 DIAGNOSIS — L57.0 ACTINIC KERATOSES: ICD-10-CM

## 2017-07-24 PROCEDURE — 17000 DESTRUCT PREMALG LESION: CPT | Mod: PBBFAC,PO | Performed by: DERMATOLOGY

## 2017-07-24 PROCEDURE — 17000 DESTRUCT PREMALG LESION: CPT | Mod: S$PBB,,, | Performed by: DERMATOLOGY

## 2017-07-24 PROCEDURE — 99213 OFFICE O/P EST LOW 20 MIN: CPT | Mod: 25,S$PBB,, | Performed by: DERMATOLOGY

## 2017-07-24 PROCEDURE — 99212 OFFICE O/P EST SF 10 MIN: CPT | Mod: PBBFAC,PO | Performed by: DERMATOLOGY

## 2017-07-24 PROCEDURE — 99999 PR PBB SHADOW E&M-EST. PATIENT-LVL II: CPT | Mod: PBBFAC,,, | Performed by: DERMATOLOGY

## 2017-07-24 RX ORDER — BUPROPION HYDROCHLORIDE 150 MG/1
300 TABLET ORAL DAILY
Qty: 180 TABLET | Refills: 3 | Status: SHIPPED | OUTPATIENT
Start: 2017-07-24 | End: 2018-07-19 | Stop reason: SDUPTHER

## 2017-07-24 RX ORDER — ESCITALOPRAM OXALATE 10 MG/1
10 TABLET ORAL DAILY
Qty: 90 TABLET | Refills: 3 | Status: SHIPPED | OUTPATIENT
Start: 2017-07-24 | End: 2017-08-03 | Stop reason: SDUPTHER

## 2017-07-24 RX ORDER — MONTELUKAST SODIUM 10 MG/1
10 TABLET ORAL NIGHTLY
Refills: 5 | COMMUNITY
Start: 2017-07-11 | End: 2019-12-16

## 2017-07-24 NOTE — LETTER
July 24, 2017      Paul Spencer MD  [id number] [address] [address]           [facility]  [address] [address]  Phone: [phone]  Fax: [phone]          Patient: Gopi Garcia   MR Number: [id number]   YOB: 1960   Date of Visit: 7/24/2017       Dear Dr. Paul Spencer:    Thank you for referring Gopi Garcia to me for evaluation. Attached you will find relevant portions of my assessment and plan of care.    If you have questions, please do not hesitate to call me. I look forward to following Gopi Garcia along with you.    Sincerely,    Kiersten Rojas MD    Enclosure  CC:  No Recipients    If you would like to receive this communication electronically, please contact [email or web address] or [phone] to request more information on [facility] access.    For providers and/or their staff who would like to refer a patient to [facility], please contact us through our one-stop-shop provider referral line, [facility], at [phone].    If you feel you have received this communication in error or would no longer like to receive these types of communications, please e-mail [email or web address]

## 2017-07-24 NOTE — PATIENT INSTRUCTIONS

## 2017-07-24 NOTE — PROGRESS NOTES
Subjective:       Patient ID:  Gopi Garcia is a 56 y.o. male who presents for   Chief Complaint   Patient presents with    Skin Check     FBSE     Spot     c/o spot under left eye x9 months    Spot     c/o spot on left jaw line x1 year     Mother c/ hx of BCC, last seen on 3/15/16    History of Present Illness: The patient presents with chief complaint of lesion.  Location: left lower eyelid  Duration: 9 months  Signs/Symptoms: growing    Prior treatments: none          Review of Systems   Constitutional: Negative for fever and chills.   Gastrointestinal: Negative for nausea and vomiting.   Skin: Negative for daily sunscreen use, activity-related sunscreen use and recent sunburn.   Hematologic/Lymphatic: Does not bruise/bleed easily.        Objective:    Physical Exam   Constitutional: He appears well-developed and well-nourished. No distress.   Neurological: He is alert and oriented to person, place, and time. He is not disoriented.   Psychiatric: He has a normal mood and affect.   Skin:   Areas Examined (abnormalities noted in diagram):   Scalp / Hair Palpated and Inspected  Head / Face Inspection Performed  Neck Inspection Performed  Chest / Axilla Inspection Performed  Abdomen Inspection Performed  Back Inspection Performed  RUE Inspected  LUE Inspection Performed  RLE Inspected  LLE Inspection Performed  Nails and Digits Inspection Performed                   Diagram Legend     Erythematous scaling macule/papule c/w actinic keratosis       Vascular papule c/w angioma      Pigmented verrucoid papule/plaque c/w seborrheic keratosis      Yellow umbilicated papule c/w sebaceous hyperplasia      Irregularly shaped tan macule c/w lentigo     1-2 mm smooth white papules consistent with Milia      Movable subcutaneous cyst with punctum c/w epidermal inclusion cyst      Subcutaneous movable cyst c/w pilar cyst      Firm pink to brown papule c/w dermatofibroma      Pedunculated fleshy papule(s) c/w skin  tag(s)      Evenly pigmented macule c/w junctional nevus     Mildly variegated pigmented, slightly irregular-bordered macule c/w mildly atypical nevus      Flesh colored to evenly pigmented papule c/w intradermal nevus       Pink pearly papule/plaque c/w basal cell carcinoma      Erythematous hyperkeratotic cursted plaque c/w SCC      Surgical scar with no sign of skin cancer recurrence      Open and closed comedones      Inflammatory papules and pustules      Verrucoid papule consistent consistent with wart     Erythematous eczematous patches and plaques     Dystrophic onycholytic nail with subungual debris c/w onychomycosis     Umbilicated papule    Erythematous-base heme-crusted tan verrucoid plaque consistent with inflamed seborrheic keratosis     Erythematous Silvery Scaling Plaque c/w Psoriasis     See annotation      Assessment / Plan:        Multiple nevi  Reassurance given.  Discussed ABCDEF of melanoma and changes for patient to look for.  AAD Handout given. Discussed importance of daily use of sunscreen.      Seborrheic keratosis  Reassurance given. Discussed diagnosis and that lesions are benign.  AAD handout given.     Angioma  Reassurance given.  Lesions are benign.    Acrochordon  Reassurance given.  Lesions are benign.    Actinic keratoses  Cryosurgery Procedure Note    The patient is informed of the precancerous quality and need for treatment of these lesions. After risks, benefits and alternatives explained, including blistering, pain, hyper- and hypopigmentation, patient verbally consents to cryotherapy to precancerous lesions. Liquid nitrogen cryosurgery is applied to the 1 actinic keratoses, as detailed in the physical exam, to produce a freeze injury. The patient is aware that blisters may form and is instructed on wound care with gentle cleansing and use of vaseline ointment to keep moist until healed. The patient is supplied a handout on cryosurgery and is instructed to call if lesions do not  completely resolve.             Return in about 1 year (around 7/24/2018).

## 2017-08-03 RX ORDER — ESCITALOPRAM OXALATE 10 MG/1
10 TABLET ORAL DAILY
Qty: 90 TABLET | Refills: 3 | Status: SHIPPED | OUTPATIENT
Start: 2017-08-03 | End: 2018-08-04 | Stop reason: SDUPTHER

## 2017-08-05 DIAGNOSIS — I25.10 CORONARY ARTERY DISEASE INVOLVING NATIVE CORONARY ARTERY OF NATIVE HEART WITHOUT ANGINA PECTORIS: ICD-10-CM

## 2017-08-05 DIAGNOSIS — E78.2 MIXED HYPERLIPIDEMIA: ICD-10-CM

## 2017-08-05 RX ORDER — ATORVASTATIN CALCIUM 40 MG/1
TABLET, FILM COATED ORAL
Qty: 90 TABLET | Refills: 1 | Status: CANCELLED | OUTPATIENT
Start: 2017-08-05

## 2017-08-28 DIAGNOSIS — E78.2 MIXED HYPERLIPIDEMIA: ICD-10-CM

## 2017-08-28 DIAGNOSIS — I25.10 CORONARY ARTERY DISEASE INVOLVING NATIVE CORONARY ARTERY OF NATIVE HEART WITHOUT ANGINA PECTORIS: ICD-10-CM

## 2017-08-28 RX ORDER — LISINOPRIL AND HYDROCHLOROTHIAZIDE 20; 25 MG/1; MG/1
1 TABLET ORAL DAILY
Qty: 30 TABLET | Refills: 5 | Status: SHIPPED | OUTPATIENT
Start: 2017-08-28 | End: 2018-05-20 | Stop reason: SDUPTHER

## 2017-08-28 RX ORDER — ATORVASTATIN CALCIUM 40 MG/1
40 TABLET, FILM COATED ORAL NIGHTLY
Qty: 30 TABLET | Refills: 5 | Status: SHIPPED | OUTPATIENT
Start: 2017-08-28 | End: 2018-02-12 | Stop reason: SDUPTHER

## 2017-08-28 NOTE — TELEPHONE ENCOUNTER
----- Message from Ludwig Patino sent at 8/28/2017 12:21 PM CDT -----  Contact: self [phone]  Pt needs a refill on his Lipitor and Lisinopril rx/ [facility]/pls call/thanks.

## 2017-10-13 ENCOUNTER — OFFICE VISIT (OUTPATIENT)
Dept: FAMILY MEDICINE | Facility: CLINIC | Age: 57
End: 2017-10-13
Payer: OTHER GOVERNMENT

## 2017-10-13 VITALS
SYSTOLIC BLOOD PRESSURE: 130 MMHG | HEIGHT: 74 IN | BODY MASS INDEX: 36.83 KG/M2 | DIASTOLIC BLOOD PRESSURE: 81 MMHG | TEMPERATURE: 99 F | WEIGHT: 287 LBS | HEART RATE: 94 BPM

## 2017-10-13 DIAGNOSIS — I25.10 MILD CAD: ICD-10-CM

## 2017-10-13 DIAGNOSIS — G47.33 OBSTRUCTIVE SLEEP APNEA SYNDROME: ICD-10-CM

## 2017-10-13 DIAGNOSIS — E78.5 HYPERLIPIDEMIA, UNSPECIFIED HYPERLIPIDEMIA TYPE: ICD-10-CM

## 2017-10-13 DIAGNOSIS — M79.671 PAIN OF RIGHT HEEL: ICD-10-CM

## 2017-10-13 DIAGNOSIS — Z00.00 ROUTINE HISTORY AND PHYSICAL EXAMINATION OF ADULT: Primary | ICD-10-CM

## 2017-10-13 DIAGNOSIS — J30.9 CHRONIC ALLERGIC RHINITIS, UNSPECIFIED SEASONALITY, UNSPECIFIED TRIGGER: ICD-10-CM

## 2017-10-13 DIAGNOSIS — I10 ESSENTIAL HYPERTENSION: ICD-10-CM

## 2017-10-13 DIAGNOSIS — B34.9 VIRAL SYNDROME: ICD-10-CM

## 2017-10-13 PROCEDURE — 99396 PREV VISIT EST AGE 40-64: CPT | Mod: S$PBB,,, | Performed by: FAMILY MEDICINE

## 2017-10-13 PROCEDURE — 99999 PR PBB SHADOW E&M-EST. PATIENT-LVL III: CPT | Mod: PBBFAC,,, | Performed by: FAMILY MEDICINE

## 2017-10-13 PROCEDURE — 99213 OFFICE O/P EST LOW 20 MIN: CPT | Mod: PBBFAC,PO | Performed by: FAMILY MEDICINE

## 2017-10-13 RX ORDER — FLUTICASONE PROPIONATE 50 MCG
SPRAY, SUSPENSION (ML) NASAL
Qty: 16 G | Refills: 12 | Status: SHIPPED | OUTPATIENT
Start: 2017-10-13 | End: 2018-02-12 | Stop reason: SDUPTHER

## 2017-10-13 RX ORDER — MELOXICAM 7.5 MG/1
7.5 TABLET ORAL DAILY
Qty: 30 TABLET | Refills: 2 | Status: SHIPPED | OUTPATIENT
Start: 2017-10-13 | End: 2018-02-12 | Stop reason: SDUPTHER

## 2017-10-13 NOTE — PROGRESS NOTES
Patient presents physical exam.  Hypertension blood pressure controlled.  Hyperlipidemia due for follow-up laboratory into the year.  Chronic ALLERGIC rhinitis partial control.  ALLERGY immunotherapy apparently recommended by allergist, but doesn't want to do this yet.  He does complain of some pain at the right heel mostly after prolonged standing during the past few weeks.  He's had similar problems in the past with plantar fasciitis, though current symptoms not when he first stands up.  He also had some diarrhea 2 days ago and then developed some aches, sweating, headache, low-grade fever 100.6.  Seems to be better today.  Diarrhea resolved.    Past Medical History:  Past Medical History:   Diagnosis Date    Anxiety     AR (allergic rhinitis)     Chronic low back pain     Colon polyp 2010    told to repeat 5 yrs    Coronary artery disease     Depression     ED (erectile dysfunction)     HTN (hypertension)     Hyperlipidemia     Myocardial infarct, old     Sleep apnea      Past Surgical History:   Procedure Laterality Date    CARDIAC CATHETERIZATION  2006    INGUINAL HERNIA REPAIR Right 2011    VASECTOMY  2005     Social History     Social History    Marital status:      Spouse name: N/A    Number of children: N/A    Years of education: N/A     Occupational History    Not on file.     Social History Main Topics    Smoking status: Former Smoker    Smokeless tobacco: Never Used    Alcohol use 3.6 oz/week     6 Cans of beer per week    Drug use: No    Sexual activity: Yes     Other Topics Concern    Not on file     Social History Narrative    No narrative on file     Family History   Problem Relation Age of Onset    Lung cancer Father     Alzheimer's disease Mother     Heart attack Paternal Grandfather     Prostate cancer Brother      Review of patient's allergies indicates:  No Known Allergies  Current Outpatient Prescriptions on File Prior to Visit   Medication Sig Dispense  Refill    aspirin (ECOTRIN) 81 MG EC tablet Take 81 mg by mouth once daily.      atorvastatin (LIPITOR) 40 MG tablet Take 1 tablet (40 mg total) by mouth every evening. 30 tablet 5    buPROPion (WELLBUTRIN XL) 150 MG TB24 tablet Take 2 tablets (300 mg total) by mouth once daily. 180 tablet 3    cetirizine (ZYRTEC) 10 MG tablet       clonazePAM (KLONOPIN) 1 MG tablet Take 1 tablet (1 mg total) by mouth nightly as needed for Anxiety. Prn insomnia 60 tablet 1    escitalopram oxalate (LEXAPRO) 10 MG tablet Take 1 tablet (10 mg total) by mouth once daily. 90 tablet 3    lisinopril-hydrochlorothiazide (PRINZIDE,ZESTORETIC) 20-25 mg Tab Take 1 tablet by mouth once daily. 30 tablet 5    montelukast (SINGULAIR) 10 mg tablet Take 10 mg by mouth every evening.  5    nitroGLYCERIN (NITROSTAT) 0.4 MG SL tablet Place 1 tablet (0.4 mg total) under the tongue every 5 (five) minutes as needed for Chest pain. Don't take if using Viagra 25 tablet 5    omega-3 fatty acids-vitamin E (FISH OIL) 1,000 mg Cap Take by mouth.      oxycodone-acetaminophen (PERCOCET)  mg per tablet Take 1 tablet by mouth 4 (four) times daily.  0    sildenafil (VIAGRA) 100 MG tablet Take 1 tablet (100 mg total) by mouth daily as needed for Erectile Dysfunction (Don't take if using any nitroglycerin). 18 tablet 3     No current facility-administered medications on file prior to visit.            ROS:  GENERAL: No fever, chills,  or significant weight changes.  HEENT: No headache or hearing complaints.  No dysphagia  Eyes: No vision complaints  CHEST: Denies ROMANO, cyanosis, wheezing, cough and sputum production.  CARDIOVASCULAR: Denies chest pain, PND, orthopnea or reduced exercise tolerance.  ABDOMEN: Appetite fine. Denies diarrhea, abdominal pain, hematemesis or blood in stool.  URINARY: No flank pain, dysuria or hematuria.  MUSCULOSKELETAL: No warmth swelling or tenderness of the joints  NEUROLOGIC: No focal weakness.  PSYCHIATRIC: Denies  "SI/HI      OBJECTIVE:     Vitals:    10/13/17 1046   BP: 130/81   Pulse: 94   Temp: 98.8 °F (37.1 °C)   Weight: 130.2 kg (287 lb)   Height: 6' 2" (1.88 m)     Wt Readings from Last 3 Encounters:   10/13/17 130.2 kg (287 lb)   06/16/17 131.9 kg (290 lb 14.4 oz)   06/07/17 131.1 kg (289 lb 2.1 oz)     APPEARANCE: Well nourished, well developed, in no acute distress.    HEAD: Normocephalic.  Atraumatic.  No sinus tenderness.  EYES:   Right eye: Pupil reactive.  Conjunctiva clear.    Left eye: Pupil reactive.  Conjunctiva clear.    Both fundi:  Grossly normal to nondilated exam. EOMI.    EARS: TM's intact. Light reflex normal. No retraction or perforation.    NOSE:  clear.  MOUTH & THROAT:  No pharyngeal erythema or exudate. No lesions.  NECK: Supple. No bruits.  No JVD.  No cervical lymphadenopathy.  No thyromegaly.    CHEST: Breath sounds clear bilaterally.  Normal respiratory effort  CARDIOVASCULAR: Normal rate.  Regular rhythm.  No murmurs.  No rub.  No gallops.  ABDOMEN: Bowel sounds normal.  Soft.  No tenderness.  No organomegaly.  PERIPHERAL VASCULAR: No cyanosis.  No clubbing.  No edema.  NEUROLOGIC: No focal findings.  MENTAL STATUS: Alert.  Oriented x 3.  Mild tenderness at the right heel.  No swelling or erythema.          Diagnoses and all orders for this visit:    Routine history and physical examination of adult  -     Comprehensive metabolic panel; Standing  -     Lipid panel; Standing    Essential hypertension  -     Comprehensive metabolic panel; Standing    Hyperlipidemia, unspecified hyperlipidemia type  -     Comprehensive metabolic panel; Standing  -     Lipid panel; Standing    Chronic allergic rhinitis, unspecified seasonality, unspecified trigger    Pain of right heel    Viral syndrome    Mild CAD    Obstructive sleep apnea syndrome    Other orders  -     fluticasone (FLONASE) 50 mcg/actuation nasal spray; Use 2 sprays to each nostril daily  -     meloxicam (MOBIC) 7.5 MG tablet; Take 1 tablet " (7.5 mg total) by mouth once daily.    Anticipatory guidance: Don't smoke.  Healthy diet and regular exercise recommended.  Laboratory into the year.  Flu shot next week.  Follow-up if her symptoms not continue to resolve

## 2017-10-14 ENCOUNTER — TELEPHONE (OUTPATIENT)
Dept: FAMILY MEDICINE | Facility: CLINIC | Age: 57
End: 2017-10-14

## 2017-10-14 NOTE — TELEPHONE ENCOUNTER
----- Message from Ludwig Patino sent at 10/14/2017  7:43 AM CDT -----  Contact: self [phone]  Pt saw Dr Spencer yesterday for fever x3days. Today he said that his fever is at 102 and he isn't feeling any better, he would like to get an antibiotic called in/[facility]/pls call/thanks.

## 2017-10-17 ENCOUNTER — OFFICE VISIT (OUTPATIENT)
Dept: FAMILY MEDICINE | Facility: CLINIC | Age: 57
End: 2017-10-17
Payer: OTHER GOVERNMENT

## 2017-10-17 ENCOUNTER — HOSPITAL ENCOUNTER (OUTPATIENT)
Dept: RADIOLOGY | Facility: HOSPITAL | Age: 57
Discharge: HOME | End: 2017-10-17
Attending: FAMILY MEDICINE
Payer: OTHER GOVERNMENT

## 2017-10-17 VITALS
BODY MASS INDEX: 36.19 KG/M2 | DIASTOLIC BLOOD PRESSURE: 83 MMHG | WEIGHT: 282 LBS | SYSTOLIC BLOOD PRESSURE: 139 MMHG | HEIGHT: 74 IN | HEART RATE: 77 BPM | TEMPERATURE: 99 F

## 2017-10-17 DIAGNOSIS — R50.9 FEVER, UNSPECIFIED FEVER CAUSE: ICD-10-CM

## 2017-10-17 DIAGNOSIS — R91.8 LUNG FIELD ABNORMAL FINDING ON EXAMINATION: ICD-10-CM

## 2017-10-17 DIAGNOSIS — J18.9 PNEUMONIA OF LEFT LOWER LOBE DUE TO INFECTIOUS ORGANISM: Primary | ICD-10-CM

## 2017-10-17 PROCEDURE — 71020 XR CHEST PA AND LATERAL: CPT | Mod: TC,PO

## 2017-10-17 PROCEDURE — 99213 OFFICE O/P EST LOW 20 MIN: CPT | Mod: PBBFAC,25,PO | Performed by: FAMILY MEDICINE

## 2017-10-17 PROCEDURE — 71020 XR CHEST PA AND LATERAL: CPT | Mod: 26,,, | Performed by: RADIOLOGY

## 2017-10-17 PROCEDURE — 99999 PR PBB SHADOW E&M-EST. PATIENT-LVL III: CPT | Mod: PBBFAC,,, | Performed by: FAMILY MEDICINE

## 2017-10-17 PROCEDURE — 99214 OFFICE O/P EST MOD 30 MIN: CPT | Mod: S$PBB,,, | Performed by: FAMILY MEDICINE

## 2017-10-17 RX ORDER — LEVOFLOXACIN 500 MG/1
500 TABLET, FILM COATED ORAL DAILY
Qty: 10 TABLET | Refills: 0 | Status: SHIPPED | OUTPATIENT
Start: 2017-10-17 | End: 2017-10-27

## 2017-10-17 NOTE — PROGRESS NOTES
Patient not feeling well past week.  He had fever up to 102 2 days ago,  yesterday, no fever today.  He's had some generalized aches, headache, nausea with dry heaves, but no emesis.  He denies cough congestion rhinorrhea or sore throat chest pain shortness of breath abdominal pain or rash.  He has chronic back pain followed by pain management.  Decreased appetite.  Current treatment Tylenol, Percocet, NyQuil, [place] without resolution no recent travel or sick contacts.    Past Medical History:  Past Medical History:   Diagnosis Date    Anxiety     AR (allergic rhinitis)     Chronic low back pain     Colon polyp 2010    told to repeat 5 yrs    Coronary artery disease     Depression     ED (erectile dysfunction)     HTN (hypertension)     Hyperlipidemia     Myocardial infarct, old     Sleep apnea      Past Surgical History:   Procedure Laterality Date    CARDIAC CATHETERIZATION  2006    INGUINAL HERNIA REPAIR Right 2011    VASECTOMY  2005     Social History     Social History    Marital status:      Spouse name: N/A    Number of children: N/A    Years of education: N/A     Occupational History    Not on file.     Social History Main Topics    Smoking status: Former Smoker     Packs/day: 1.00     Years: 30.00     Quit date: 10/17/2007    Smokeless tobacco: Never Used    Alcohol use 3.6 oz/week     6 Cans of beer per week    Drug use: No    Sexual activity: Yes     Other Topics Concern    Not on file     Social History Narrative    No narrative on file     Family History   Problem Relation Age of Onset    Lung cancer Father     Alzheimer's disease Mother     Heart attack Paternal Grandfather     Prostate cancer Brother      Review of patient's allergies indicates:  No Known Allergies  Current Outpatient Prescriptions on File Prior to Visit   Medication Sig Dispense Refill    aspirin (ECOTRIN) 81 MG EC tablet Take 81 mg by mouth once daily.      atorvastatin (LIPITOR) 40  "MG tablet Take 1 tablet (40 mg total) by mouth every evening. 30 tablet 5    buPROPion (WELLBUTRIN XL) 150 MG TB24 tablet Take 2 tablets (300 mg total) by mouth once daily. 180 tablet 3    cetirizine (ZYRTEC) 10 MG tablet       clonazePAM (KLONOPIN) 1 MG tablet Take 1 tablet (1 mg total) by mouth nightly as needed for Anxiety. Prn insomnia 60 tablet 1    escitalopram oxalate (LEXAPRO) 10 MG tablet Take 1 tablet (10 mg total) by mouth once daily. 90 tablet 3    fluticasone (FLONASE) 50 mcg/actuation nasal spray Use 2 sprays to each nostril daily 16 g 12    lisinopril-hydrochlorothiazide (PRINZIDE,ZESTORETIC) 20-25 mg Tab Take 1 tablet by mouth once daily. 30 tablet 5    meloxicam (MOBIC) 7.5 MG tablet Take 1 tablet (7.5 mg total) by mouth once daily. 30 tablet 2    montelukast (SINGULAIR) 10 mg tablet Take 10 mg by mouth every evening.  5    nitroGLYCERIN (NITROSTAT) 0.4 MG SL tablet Place 1 tablet (0.4 mg total) under the tongue every 5 (five) minutes as needed for Chest pain. Don't take if using Viagra 25 tablet 5    omega-3 fatty acids-vitamin E (FISH OIL) 1,000 mg Cap Take by mouth.      oxycodone-acetaminophen (PERCOCET)  mg per tablet Take 1 tablet by mouth 4 (four) times daily.  0    sildenafil (VIAGRA) 100 MG tablet Take 1 tablet (100 mg total) by mouth daily as needed for Erectile Dysfunction (Don't take if using any nitroglycerin). 18 tablet 3     No current facility-administered medications on file prior to visit.            OBJECTIVE:     Vitals:    10/17/17 1406   BP: 139/83   Pulse: 77   Temp: 98.6 °F (37 °C)   Weight: 127.9 kg (282 lb)   Height: 6' 2" (1.88 m)     Wt Readings from Last 3 Encounters:   10/17/17 127.9 kg (282 lb)   10/13/17 130.2 kg (287 lb)   06/16/17 131.9 kg (290 lb 14.4 oz)     APPEARANCE: Well nourished, well developed, in no acute distress.    HEAD: Normocephalic.  Atraumatic.  No sinus tenderness.  EYES:   Right eye: Pupil reactive.  Conjunctiva clear.    Left " eye: Pupil reactive.  Conjunctiva clear.    Both fundi:  Grossly normal to nondilated exam. EOMI.    EARS: TM's intact. Light reflex normal. No retraction or perforation.    NOSE:  clear.  MOUTH & THROAT:  No pharyngeal erythema or exudate. No lesions.  NECK: Supple. No bruits.  No JVD.  No cervical lymphadenopathy.  No thyromegaly.    CHEST: He has some rales at the left base otherwise clear  Normal respiratory effort  CARDIOVASCULAR: Normal rate.  Regular rhythm.  No murmurs.  No rub.  No gallops.  ABDOMEN: Bowel sounds normal.  Soft.  No tenderness.  No organomegaly.  PERIPHERAL VASCULAR: No cyanosis.  No clubbing.  No edema.  NEUROLOGIC: No focal findings.  MENTAL STATUS: Alert.  Oriented x 3.    Chest x-ray consistent with left lower lobe pneumonia      Diagnoses and all orders for this visit:    Pneumonia of left lower lobe due to infectious organism  -     X-Ray Chest PA And Lateral; Future    Fever, unspecified fever cause  -     X-Ray Chest PA And Lateral; Future    Lung field abnormal finding on examination  -     X-Ray Chest PA And Lateral; Future    Other orders  -     levoFLOXacin (LEVAQUIN) 500 MG tablet; Take 1 tablet (500 mg total) by mouth once daily.     follow-up if symptoms worsen or not improving during the next 3 days.  Otherwise repeat chest x-ray in one month

## 2017-10-24 ENCOUNTER — TELEPHONE (OUTPATIENT)
Dept: FAMILY MEDICINE | Facility: CLINIC | Age: 57
End: 2017-10-24

## 2017-10-24 NOTE — TELEPHONE ENCOUNTER
If the fever has resolved and he is overall improving (no shortness of breath, chest pain) then there is not much else I can recommend other than to complete the antibiotics.  It may take several weeks to improve after having a pneumonia even once the fever has gone away.  He does need to get the repeat chest x-ray in a few weeks.  Would recommend further evaluation at that time if the x-ray has not resolved.

## 2017-10-24 NOTE — TELEPHONE ENCOUNTER
----- Message from Juani Lu sent at 10/24/2017  9:53 AM CDT -----  Contact: Mable wife  Calling concerning patient has been on antibiotics for past 7 days does not have a fever but no energy and feeling poorly. Calling concerning if patient can get something to boost him up. Please call to advise @ [phone]. Thanks, chantell

## 2018-01-19 ENCOUNTER — TELEPHONE (OUTPATIENT)
Dept: PULMONOLOGY | Facility: CLINIC | Age: 58
End: 2018-01-19

## 2018-01-19 ENCOUNTER — PATIENT MESSAGE (OUTPATIENT)
Dept: PULMONOLOGY | Facility: CLINIC | Age: 58
End: 2018-01-19

## 2018-02-12 DIAGNOSIS — E78.2 MIXED HYPERLIPIDEMIA: ICD-10-CM

## 2018-02-12 DIAGNOSIS — I25.10 CORONARY ARTERY DISEASE INVOLVING NATIVE CORONARY ARTERY OF NATIVE HEART WITHOUT ANGINA PECTORIS: ICD-10-CM

## 2018-02-12 RX ORDER — FLUTICASONE PROPIONATE 50 MCG
SPRAY, SUSPENSION (ML) NASAL
Qty: 16 G | Refills: 2 | Status: SHIPPED | OUTPATIENT
Start: 2018-02-12 | End: 2019-12-16

## 2018-02-12 RX ORDER — MELOXICAM 7.5 MG/1
7.5 TABLET ORAL DAILY
Qty: 90 TABLET | Refills: 3 | Status: SHIPPED | OUTPATIENT
Start: 2018-02-12 | End: 2018-12-28 | Stop reason: SDUPTHER

## 2018-02-12 RX ORDER — ATORVASTATIN CALCIUM 40 MG/1
40 TABLET, FILM COATED ORAL NIGHTLY
Qty: 90 TABLET | Refills: 3 | Status: SHIPPED | OUTPATIENT
Start: 2018-02-12 | End: 2019-02-07 | Stop reason: SDUPTHER

## 2018-03-09 ENCOUNTER — PATIENT MESSAGE (OUTPATIENT)
Dept: FAMILY MEDICINE | Facility: CLINIC | Age: 58
End: 2018-03-09

## 2018-03-09 DIAGNOSIS — Z12.9 SCREENING FOR CANCER: ICD-10-CM

## 2018-03-09 NOTE — TELEPHONE ENCOUNTER
We had discussed this when he came in with his wife recently.  He does meet criteria for lung cancer screening.  I went ahead and put in an order for low-dose screening CT chest.  These are done in [place] [facility].

## 2018-03-12 ENCOUNTER — PATIENT MESSAGE (OUTPATIENT)
Dept: FAMILY MEDICINE | Facility: CLINIC | Age: 58
End: 2018-03-12

## 2018-03-14 ENCOUNTER — TELEPHONE (OUTPATIENT)
Dept: FAMILY MEDICINE | Facility: CLINIC | Age: 58
End: 2018-03-14

## 2018-03-14 DIAGNOSIS — Z00.00 HEALTHCARE MAINTENANCE: Primary | ICD-10-CM

## 2018-03-14 NOTE — TELEPHONE ENCOUNTER
Patient scheduled for CT on Friday 3/16/18, should he do creatinine before he goes, last CMP was in 2016.

## 2018-03-22 ENCOUNTER — LAB VISIT (OUTPATIENT)
Dept: LAB | Facility: HOSPITAL | Age: 58
End: 2018-03-22
Attending: FAMILY MEDICINE
Payer: OTHER GOVERNMENT

## 2018-03-22 DIAGNOSIS — I10 ESSENTIAL HYPERTENSION: ICD-10-CM

## 2018-03-22 DIAGNOSIS — Z00.00 ROUTINE HISTORY AND PHYSICAL EXAMINATION OF ADULT: ICD-10-CM

## 2018-03-22 DIAGNOSIS — E78.5 HYPERLIPIDEMIA, UNSPECIFIED HYPERLIPIDEMIA TYPE: ICD-10-CM

## 2018-03-22 PROCEDURE — 80061 LIPID PANEL: CPT

## 2018-03-22 PROCEDURE — 36415 COLL VENOUS BLD VENIPUNCTURE: CPT | Mod: PO

## 2018-03-22 PROCEDURE — 80053 COMPREHEN METABOLIC PANEL: CPT

## 2018-03-23 ENCOUNTER — HOSPITAL ENCOUNTER (OUTPATIENT)
Dept: RADIOLOGY | Facility: HOSPITAL | Age: 58
Discharge: HOME | End: 2018-03-23
Attending: FAMILY MEDICINE

## 2018-03-23 DIAGNOSIS — Z12.9 SCREENING FOR CANCER: ICD-10-CM

## 2018-03-23 LAB
ALBUMIN SERPL BCP-MCNC: 4.1 G/DL
ALP SERPL-CCNC: 80 U/L
ALT SERPL W/O P-5'-P-CCNC: 9 U/L
ANION GAP SERPL CALC-SCNC: 13 MMOL/L
AST SERPL-CCNC: 16 U/L
BILIRUB SERPL-MCNC: 1.3 MG/DL
BUN SERPL-MCNC: 12 MG/DL
CALCIUM SERPL-MCNC: 9 MG/DL
CHLORIDE SERPL-SCNC: 103 MMOL/L
CHOLEST SERPL-MCNC: 144 MG/DL
CHOLEST/HDLC SERPL: 3.7 {RATIO}
CO2 SERPL-SCNC: 28 MMOL/L
CREAT SERPL-MCNC: 1 MG/DL
EST. GFR  (AFRICAN AMERICAN): >60 ML/MIN/1.73 M^2
EST. GFR  (NON AFRICAN AMERICAN): >60 ML/MIN/1.73 M^2
GLUCOSE SERPL-MCNC: 98 MG/DL
HDLC SERPL-MCNC: 39 MG/DL
HDLC SERPL: 27.1 %
LDLC SERPL CALC-MCNC: 70.2 MG/DL
NONHDLC SERPL-MCNC: 105 MG/DL
POTASSIUM SERPL-SCNC: 3.9 MMOL/L
PROT SERPL-MCNC: 6.8 G/DL
SODIUM SERPL-SCNC: 144 MMOL/L
TRIGL SERPL-MCNC: 174 MG/DL

## 2018-03-23 PROCEDURE — 76497 UNLISTED CT PROCEDURE: CPT | Mod: TC,PO

## 2018-05-21 RX ORDER — LISINOPRIL AND HYDROCHLOROTHIAZIDE 20; 25 MG/1; MG/1
TABLET ORAL
Qty: 90 TABLET | Refills: 1 | Status: SHIPPED | OUTPATIENT
Start: 2018-05-21 | End: 2018-11-16 | Stop reason: SDUPTHER

## 2018-07-19 RX ORDER — BUPROPION HYDROCHLORIDE 150 MG/1
TABLET ORAL
Qty: 180 TABLET | Refills: 0 | Status: SHIPPED | OUTPATIENT
Start: 2018-07-19 | End: 2018-09-07 | Stop reason: DRUGHIGH

## 2018-08-06 RX ORDER — ESCITALOPRAM OXALATE 10 MG/1
TABLET ORAL
Qty: 90 TABLET | Refills: 0 | Status: SHIPPED | OUTPATIENT
Start: 2018-08-06 | End: 2018-10-31 | Stop reason: SDUPTHER

## 2018-08-10 ENCOUNTER — OFFICE VISIT (OUTPATIENT)
Dept: PULMONOLOGY | Facility: CLINIC | Age: 58
End: 2018-08-10
Payer: OTHER GOVERNMENT

## 2018-08-10 VITALS
OXYGEN SATURATION: 98 % | HEART RATE: 74 BPM | HEIGHT: 74 IN | WEIGHT: 300.25 LBS | BODY MASS INDEX: 38.53 KG/M2 | DIASTOLIC BLOOD PRESSURE: 84 MMHG | RESPIRATION RATE: 18 BRPM | SYSTOLIC BLOOD PRESSURE: 120 MMHG

## 2018-08-10 DIAGNOSIS — G47.33 OSA (OBSTRUCTIVE SLEEP APNEA): Primary | ICD-10-CM

## 2018-08-10 PROCEDURE — 99213 OFFICE O/P EST LOW 20 MIN: CPT | Mod: S$PBB,,, | Performed by: NURSE PRACTITIONER

## 2018-08-10 PROCEDURE — 99214 OFFICE O/P EST MOD 30 MIN: CPT | Mod: PBBFAC,PO | Performed by: NURSE PRACTITIONER

## 2018-08-10 PROCEDURE — 99999 PR PBB SHADOW E&M-EST. PATIENT-LVL IV: CPT | Mod: PBBFAC,,, | Performed by: NURSE PRACTITIONER

## 2018-08-28 ENCOUNTER — OFFICE VISIT (OUTPATIENT)
Dept: FAMILY MEDICINE | Facility: CLINIC | Age: 58
End: 2018-08-28
Payer: OTHER GOVERNMENT

## 2018-08-28 VITALS
DIASTOLIC BLOOD PRESSURE: 82 MMHG | BODY MASS INDEX: 38.24 KG/M2 | HEIGHT: 74 IN | TEMPERATURE: 99 F | HEART RATE: 101 BPM | WEIGHT: 298 LBS | SYSTOLIC BLOOD PRESSURE: 142 MMHG

## 2018-08-28 DIAGNOSIS — S91.312D LACERATION OF LEFT FOOT, SUBSEQUENT ENCOUNTER: Primary | ICD-10-CM

## 2018-08-28 DIAGNOSIS — L91.8 SKIN TAG: ICD-10-CM

## 2018-08-28 DIAGNOSIS — F32.A DEPRESSION, UNSPECIFIED DEPRESSION TYPE: ICD-10-CM

## 2018-08-28 DIAGNOSIS — Z51.89 VISIT FOR WOUND CHECK: ICD-10-CM

## 2018-08-28 PROCEDURE — 99999 PR PBB SHADOW E&M-EST. PATIENT-LVL V: CPT | Mod: PBBFAC,,, | Performed by: NURSE PRACTITIONER

## 2018-08-28 PROCEDURE — 99215 OFFICE O/P EST HI 40 MIN: CPT | Mod: PBBFAC,PO | Performed by: NURSE PRACTITIONER

## 2018-08-28 PROCEDURE — 99213 OFFICE O/P EST LOW 20 MIN: CPT | Mod: S$PBB,,, | Performed by: NURSE PRACTITIONER

## 2018-08-28 NOTE — PATIENT INSTRUCTIONS
Keep area clean and dry  Clean with mild soap and water; pat dry  Apply neosporin OTC 3 times daily  Report to ER immediately if symptoms worsen  RTC on Friday for wound check

## 2018-08-31 ENCOUNTER — OFFICE VISIT (OUTPATIENT)
Dept: FAMILY MEDICINE | Facility: CLINIC | Age: 58
End: 2018-08-31
Payer: OTHER GOVERNMENT

## 2018-08-31 ENCOUNTER — LAB VISIT (OUTPATIENT)
Dept: LAB | Facility: HOSPITAL | Age: 58
End: 2018-08-31
Attending: NURSE PRACTITIONER
Payer: OTHER GOVERNMENT

## 2018-08-31 ENCOUNTER — PATIENT MESSAGE (OUTPATIENT)
Dept: FAMILY MEDICINE | Facility: CLINIC | Age: 58
End: 2018-08-31

## 2018-08-31 VITALS
SYSTOLIC BLOOD PRESSURE: 122 MMHG | HEIGHT: 74 IN | DIASTOLIC BLOOD PRESSURE: 82 MMHG | TEMPERATURE: 98 F | HEART RATE: 76 BPM | WEIGHT: 300 LBS | BODY MASS INDEX: 38.5 KG/M2

## 2018-08-31 DIAGNOSIS — F32.A DEPRESSION, UNSPECIFIED DEPRESSION TYPE: ICD-10-CM

## 2018-08-31 DIAGNOSIS — Z48.02 VISIT FOR SUTURE REMOVAL: Primary | ICD-10-CM

## 2018-08-31 LAB — TESTOST SERPL-MCNC: 278 NG/DL

## 2018-08-31 PROCEDURE — 84403 ASSAY OF TOTAL TESTOSTERONE: CPT

## 2018-08-31 PROCEDURE — 99999 PR PBB SHADOW E&M-EST. PATIENT-LVL IV: CPT | Mod: PBBFAC,,, | Performed by: NURSE PRACTITIONER

## 2018-08-31 PROCEDURE — 99212 OFFICE O/P EST SF 10 MIN: CPT | Mod: S$PBB,,, | Performed by: NURSE PRACTITIONER

## 2018-08-31 PROCEDURE — 36415 COLL VENOUS BLD VENIPUNCTURE: CPT | Mod: PO

## 2018-08-31 PROCEDURE — 99214 OFFICE O/P EST MOD 30 MIN: CPT | Mod: PBBFAC,PO | Performed by: NURSE PRACTITIONER

## 2018-08-31 NOTE — PROGRESS NOTES
Subjective:       Patient ID: Gopi Garcia is a 57 y.o. male.    Chief Complaint: Wound Check    Suture / Staple Removal   The sutures were placed 11 to 14 days ago. He tried regular soap and water washings and a wound recheck since the wound repair. The treatment provided significant relief. The maximum temperature noted was less than 100.4 F. The temperature was taken using an oral thermometer. There has been no drainage from the wound. There is no redness present. There is no swelling present. He has no difficulty moving the affected extremity or digit.     Past Medical History:   Diagnosis Date    Anxiety     AR (allergic rhinitis)     Chronic low back pain     Colon polyp 2010    told to repeat 5 yrs    Coronary artery disease     Depression     ED (erectile dysfunction)     HTN (hypertension)     Hyperlipidemia     Myocardial infarct, old     Sleep apnea      Social History     Socioeconomic History    Marital status:      Spouse name: Not on file    Number of children: Not on file    Years of education: Not on file    Highest education level: Not on file   Social Needs    Financial resource strain: Not on file    Food insecurity - worry: Not on file    Food insecurity - inability: Not on file    Transportation needs - medical: Not on file    Transportation needs - non-medical: Not on file   Occupational History    Not on file   Tobacco Use    Smoking status: Former Smoker     Packs/day: 1.00     Years: 30.00     Pack years: 30.00     Last attempt to quit: 10/17/2007     Years since quitting: 10.8    Smokeless tobacco: Never Used   Substance and Sexual Activity    Alcohol use: Yes     Alcohol/week: 3.6 oz     Types: 6 Cans of beer per week    Drug use: No    Sexual activity: Yes   Other Topics Concern    Not on file   Social History Narrative    Not on file     Past Surgical History:   Procedure Laterality Date    CARDIAC CATHETERIZATION  2006    INGUINAL HERNIA  REPAIR Right 2011    VASECTOMY  2005       Review of Systems   Constitutional: Negative.    HENT: Negative.    Eyes: Negative.    Respiratory: Negative.    Cardiovascular: Negative.    Gastrointestinal: Negative.    Endocrine: Negative.    Genitourinary: Negative.    Musculoskeletal: Negative.    Skin: Positive for wound (Healed laceration to left foot; sutures x 2 in place).   Allergic/Immunologic: Negative.    Neurological: Negative.    Psychiatric/Behavioral: Negative.        Objective:      Physical Exam   Constitutional: He is oriented to person, place, and time. He appears well-developed and well-nourished.   HENT:   Head: Normocephalic.   Right Ear: External ear normal.   Left Ear: External ear normal.   Nose: Nose normal.   Mouth/Throat: Oropharynx is clear and moist.   Eyes: Conjunctivae are normal. Pupils are equal, round, and reactive to light.   Neck: Normal range of motion. Neck supple.   Cardiovascular: Normal rate, regular rhythm and normal heart sounds.   Pulmonary/Chest: Effort normal and breath sounds normal.   Abdominal: Soft. Bowel sounds are normal.   Musculoskeletal: Normal range of motion.        Feet:    Neurological: He is alert and oriented to person, place, and time.   Skin: Skin is warm. Capillary refill takes 2 to 3 seconds.   Psychiatric: He has a normal mood and affect. His behavior is normal. Judgment and thought content normal.   Nursing note and vitals reviewed.      Assessment:       1. Visit for suture removal        Plan:           Gopi was seen today for wound check.    Diagnoses and all orders for this visit:    Visit for suture removal  Keep area clean and dry  Neosporin OTC as directed over 2-3 d  Report to ER immediately if symptoms worsen    Suture removal as follows:  Sutures x 2 noted prior to removal; sterile suture kit used to remove sutures; sutures x 2 counted following removal; steristrips x 2; large bandage placed; tolerated well

## 2018-08-31 NOTE — PROGRESS NOTES
Subjective:       Patient ID: Gopi Garcia is a 57 y.o. male.    Chief Complaint: Suture / Staple Removal    Suture / Staple Removal   The sutures were placed 7 to 10 days ago. He tried regular peroxide and water cleansings since the wound repair. The treatment provided moderate relief. The maximum temperature noted was less than 100.4 F. The temperature was taken using an oral thermometer. There has been no drainage from the wound. There is no redness present. There is no swelling present. There is no pain present. He has no difficulty moving the affected extremity or digit.   Pt also requests psychiatry and dermatology referrals; states sees psychiatry yearly for depression and feels medication needs adjusting; requests testosterone check. Denies SI/HI. Pt also states has yearly skin checks and skin tag removal yearly by dermatology.   Past Medical History:   Diagnosis Date    Anxiety     AR (allergic rhinitis)     Chronic low back pain     Colon polyp 2010    told to repeat 5 yrs    Coronary artery disease     Depression     ED (erectile dysfunction)     HTN (hypertension)     Hyperlipidemia     Myocardial infarct, old     Sleep apnea      Social History     Socioeconomic History    Marital status:      Spouse name: Not on file    Number of children: Not on file    Years of education: Not on file    Highest education level: Not on file   Social Needs    Financial resource strain: Not on file    Food insecurity - worry: Not on file    Food insecurity - inability: Not on file    Transportation needs - medical: Not on file    Transportation needs - non-medical: Not on file   Occupational History    Not on file   Tobacco Use    Smoking status: Former Smoker     Packs/day: 1.00     Years: 30.00     Pack years: 30.00     Last attempt to quit: 10/17/2007     Years since quitting: 10.8    Smokeless tobacco: Never Used   Substance and Sexual Activity    Alcohol use: Yes      Alcohol/week: 3.6 oz     Types: 6 Cans of beer per week    Drug use: No    Sexual activity: Yes   Other Topics Concern    Not on file   Social History Narrative    Not on file     Past Surgical History:   Procedure Laterality Date    CARDIAC CATHETERIZATION  2006    INGUINAL HERNIA REPAIR Right 2011    VASECTOMY  2005       Review of Systems   Constitutional: Negative.    HENT: Negative.    Eyes: Negative.    Respiratory: Negative.    Cardiovascular: Negative.    Gastrointestinal: Negative.    Endocrine: Negative.    Genitourinary: Negative.    Musculoskeletal: Negative.    Skin: Positive for wound (Left foot laceration; stitches x 2).   Allergic/Immunologic: Negative.    Neurological: Negative.    Hematological: Negative.    Psychiatric/Behavioral: Positive for dysphoric mood.       Objective:      Physical Exam   Constitutional: He is oriented to person, place, and time. He appears well-developed and well-nourished.   HENT:   Head: Normocephalic.   Right Ear: External ear normal.   Left Ear: External ear normal.   Nose: Nose normal.   Mouth/Throat: Oropharynx is clear and moist.   Eyes: Conjunctivae are normal. Pupils are equal, round, and reactive to light.   Neck: Normal range of motion. Neck supple.   Cardiovascular: Regular rhythm and normal heart sounds.   Pulmonary/Chest: Effort normal and breath sounds normal.   Abdominal: Soft. Bowel sounds are normal.   Musculoskeletal: Normal range of motion.        Feet:    Neurological: He is alert and oriented to person, place, and time.   Skin: Skin is warm and dry. Capillary refill takes 2 to 3 seconds.   Psychiatric: He has a normal mood and affect. His behavior is normal. Judgment and thought content normal.   Nursing note and vitals reviewed.      Assessment:       1. Laceration of left foot, subsequent encounter    2. Visit for wound check    3. Depression, unspecified depression type    4. Skin tag        Plan:           Gopi was seen today for suture /  staple removal.    Diagnoses and all orders for this visit:    Laceration of left foot, subsequent encounter  Visit for wound check  Keep area clean and dry  Clean with mild soap and water; pat dry  Apply neosporin OTC 3 times daily  Report to ER immediately if symptoms worsen  RTC on Friday for wound check    Depression, unspecified depression type  -     Ambulatory referral to Psychiatry  -     Testosterone; Future    Skin tag  -     Ambulatory referral to Dermatology

## 2018-08-31 NOTE — PATIENT INSTRUCTIONS
Keep area clean and dry  Neosporin OTC as directed over 2-3 d  Report to ER immediately if symptoms worsen

## 2018-09-06 ENCOUNTER — PATIENT OUTREACH (OUTPATIENT)
Dept: ADMINISTRATIVE | Facility: HOSPITAL | Age: 58
End: 2018-09-06

## 2018-09-07 ENCOUNTER — OFFICE VISIT (OUTPATIENT)
Dept: FAMILY MEDICINE | Facility: CLINIC | Age: 58
End: 2018-09-07
Payer: OTHER GOVERNMENT

## 2018-09-07 VITALS
HEART RATE: 87 BPM | BODY MASS INDEX: 37.05 KG/M2 | HEIGHT: 75 IN | DIASTOLIC BLOOD PRESSURE: 86 MMHG | TEMPERATURE: 99 F | SYSTOLIC BLOOD PRESSURE: 132 MMHG | WEIGHT: 298 LBS

## 2018-09-07 DIAGNOSIS — F32.A DEPRESSION, UNSPECIFIED DEPRESSION TYPE: ICD-10-CM

## 2018-09-07 DIAGNOSIS — G47.33 OBSTRUCTIVE SLEEP APNEA SYNDROME: ICD-10-CM

## 2018-09-07 DIAGNOSIS — S91.312D LACERATION OF LEFT FOOT, SUBSEQUENT ENCOUNTER: Primary | ICD-10-CM

## 2018-09-07 PROCEDURE — 99213 OFFICE O/P EST LOW 20 MIN: CPT | Mod: PBBFAC,PO | Performed by: FAMILY MEDICINE

## 2018-09-07 PROCEDURE — 99214 OFFICE O/P EST MOD 30 MIN: CPT | Mod: S$PBB,,, | Performed by: FAMILY MEDICINE

## 2018-09-07 PROCEDURE — 99999 PR PBB SHADOW E&M-EST. PATIENT-LVL III: CPT | Mod: PBBFAC,,, | Performed by: FAMILY MEDICINE

## 2018-09-07 RX ORDER — BUPROPION HYDROCHLORIDE 300 MG/1
300 TABLET ORAL DAILY
Qty: 90 TABLET | Refills: 1 | Status: SHIPPED | OUTPATIENT
Start: 2018-09-07 | End: 2019-02-17 | Stop reason: SDUPTHER

## 2018-09-07 NOTE — PROGRESS NOTES
Follow-up ER visit as below.  He already had the sutures removed.  He just wanted to get it checked again.  No fever chills.  Has full motion at his toe.  Pain that he had is improving.    Also has some continued depression and concerned about weight gain with his Lexapro.  He is also taking Wellbutrin, but only using 150 mg rather than 300 as listed.  He did see Psychiatry previously and is interested in follow-up with them.  He denies any SI/HI.    Sleep apnea using CPAP.  This seemed to help.    ED Provider Notes - Chip Leggett MD - 08/18/2018 11:35 AM CDT  Associated Order(s): Laceration    Formatting of this note might be different from the original.      Triage Note Reviewed    History     Chief Complaint   Patient presents with    Laceration     History of Present IllnessPatient is a 57-year-old male was taking out the trash, there is a broken coffee mug, it fell off striking him on the left foot, occurred just prior to arrival, says it bled fairly heavily initially, able to control it with pressure. No numbness or tingling.    The history is provided by the patient.   Laceration   Location: Foot  Foot laceration location: L foot  Length: 1  Depth: Through dermis  Quality: straight   Bleeding: venous and controlled with pressure   Laceration mechanism: Broken glass  Pain details:   Quality: Aching  Severity: Mild  Foreign body present: Unable to specify  Tetanus status: Up to date  Associated symptoms: no focal weakness and no numbness     Review of Systems   Constitutional: Negative.   HENT: Negative.   Musculoskeletal: Positive for arthralgias and back pain.   Skin: Positive for wound.   Neurological: Negative. Negative for focal weakness.         No Known Allergies    Past Medical History:   Diagnosis Date    Anxiety    Arthritis    Depression    Hypercholesteremia    Hypertension    Myocardial infarction (HCC)     Past Surgical History:   Procedure Laterality Date    Hernia repair    Vasectomy  "      Family History   Problem Relation Age of Onset    Alzheimer's disease Mother    Cancer Father     Social History     Tobacco Use    Smoking status: Former Smoker    Smokeless tobacco: Never Used   Substance Use Topics    Alcohol use: Yes   Comment: occasionl    Drug use: No     Physical Exam     Visit Vitals  /76 (BP Location: Left arm, Patient Position: Sitting)   Pulse 74   Temp 97.8 °F (36.6 °C) (Oral)   Resp 18   Ht 6' 2" (1.88 m)   Wt (!) 280 lb (127 kg)   SpO2 97%   BMI 35.95 kg/m²     Physical Exam   Constitutional: He is oriented to person, place, and time.   Cardiovascular: Intact distal pulses.   Musculoskeletal: Normal range of motion.   Feet:    Neurological: He is alert and oriented to person, place, and time.   Skin: Skin is warm and dry.   Nursing note and vitals reviewed.    ED Course   Labs Reviewed - No data to display    Lab Results for last 36Hrs:  No results found for this or any previous visit (from the past 36 hour(s)).    Diagnostic Results for last 36Hrs:  No results found.    Wet Read Results   XR Foot Left AP And Lateral (Results Pending)     Medications   lidocaine (PF) 2% 20 mg/mL (2 %) inj solution (not administered)     Laceration  Date/Time: 8/18/2018 11:37 AM  Performed by: Chip Leggett MD  Authorized by: Chip Leggett MD     Consent:   Consent obtained: Verbal  Consent given by: Patient  Risks discussed: Infection, need for additional repair, nerve damage, poor wound healing, pain, retained foreign body, tendon damage and vascular damage  Anesthesia (see MAR for exact dosages):   Anesthesia method: Local infiltration  Local anesthetic: Lidocaine 2% w/o epi  Sedation:   Patient sedated?: No   Laceration details:   Location: Foot  Foot location: Top of L foot  Length (cm): 1  Repair type:   Repair type: Simple  Pre-procedure details:   Preparation: Patient was prepped and draped in usual sterile fashion and imaging obtained to evaluate for foreign " bodies  Exploration:   Wound exploration: wound explored through full range of motion and entire depth of wound probed and visualized   Wound extent: fascia violated   Wound extent: no foreign bodies/material noted and no tendon damage noted   Contaminated: no   Treatment:   Area cleansed with: Betadine  Amount of cleaning: Standard  Irrigation solution: Sterile saline  Irrigation method: Syringe  Skin repair:   Repair method: Sutures  Suture material: Nylon  Suture technique: Simple interrupted  Number of sutures: 2  Approximation:   Approximation: Close  Post-procedure details:   Dressing: Antibiotic ointment and non-adherent dressing  Patient tolerance of procedure: Tolerated well, no immediate complications  Post Procedure: All invasive equipment/supplies accounted for at completion of procedure.         MDM small area of cortical fascia violated, patient has good motion of his toes, not find any evidence for tendon laceration I did discuss with him that there is any worsening or dysfunction of his toes to follow-up with podiatry, suture removal in 10-12 days, x-ray of the foot independently reviewed by me and my interpretation no fracture or foreign body noted.    New Prescriptions   No medications on file     ED Critical Care Time    Diagnosis:  Foot laceration      Chip Leggett MD  08/18/18 8490      Past Medical History:  Past Medical History:   Diagnosis Date    Anxiety     AR (allergic rhinitis)     Chronic low back pain     Colon polyp 2010    told to repeat 5 yrs    Coronary artery disease     Depression     ED (erectile dysfunction)     HTN (hypertension)     Hyperlipidemia     Myocardial infarct, old     Sleep apnea      Past Surgical History:   Procedure Laterality Date    CARDIAC CATHETERIZATION  2006    INGUINAL HERNIA REPAIR Right 2011    VASECTOMY  2005     Social History     Socioeconomic History    Marital status:      Spouse name: Not on file    Number of children: Not  on file    Years of education: Not on file    Highest education level: Not on file   Social Needs    Financial resource strain: Not on file    Food insecurity - worry: Not on file    Food insecurity - inability: Not on file    Transportation needs - medical: Not on file    Transportation needs - non-medical: Not on file   Occupational History    Not on file   Tobacco Use    Smoking status: Former Smoker     Packs/day: 1.00     Years: 30.00     Pack years: 30.00     Last attempt to quit: 10/17/2007     Years since quitting: 10.8    Smokeless tobacco: Never Used   Substance and Sexual Activity    Alcohol use: Yes     Alcohol/week: 3.6 oz     Types: 6 Cans of beer per week    Drug use: No    Sexual activity: Yes   Other Topics Concern    Not on file   Social History Narrative    Not on file     Family History   Problem Relation Age of Onset    Lung cancer Father     Alzheimer's disease Mother     Heart attack Paternal Grandfather     Prostate cancer Brother      Review of patient's allergies indicates:  No Known Allergies  Current Outpatient Medications on File Prior to Visit   Medication Sig Dispense Refill    aspirin (ECOTRIN) 81 MG EC tablet Take 81 mg by mouth once daily.      atorvastatin (LIPITOR) 40 MG tablet Take 1 tablet (40 mg total) by mouth every evening. 90 tablet 3    cetirizine (ZYRTEC) 10 MG tablet       escitalopram oxalate (LEXAPRO) 10 MG tablet TAKE 1 TABLET DAILY 90 tablet 0    fluticasone (FLONASE) 50 mcg/actuation nasal spray Use 2 sprays to each nostril daily 16 g 2    lisinopril-hydrochlorothiazide (PRINZIDE,ZESTORETIC) 20-25 mg Tab TAKE 1 TABLET DAILY 90 tablet 1    meloxicam (MOBIC) 7.5 MG tablet Take 1 tablet (7.5 mg total) by mouth once daily. 90 tablet 3    montelukast (SINGULAIR) 10 mg tablet Take 10 mg by mouth every evening.  5    nitroGLYCERIN (NITROSTAT) 0.4 MG SL tablet Place 1 tablet (0.4 mg total) under the tongue every 5 (five) minutes as needed for  "Chest pain. Don't take if using Viagra 25 tablet 5    omega-3 fatty acids-vitamin E (FISH OIL) 1,000 mg Cap Take by mouth.      oxycodone-acetaminophen (PERCOCET)  mg per tablet Take 1 tablet by mouth 4 (four) times daily.  0    sildenafil (VIAGRA) 100 MG tablet Take 1 tablet (100 mg total) by mouth daily as needed for Erectile Dysfunction (Don't take if using any nitroglycerin). 18 tablet 3    [DISCONTINUED] buPROPion (WELLBUTRIN XL) 150 MG TB24 tablet TAKE 2 TABLETS ONCE DAILY (Patient taking differently: TAKE 1 TABLET ONCE DAILY) 180 tablet 0    clonazePAM (KLONOPIN) 1 MG tablet Take 1 tablet (1 mg total) by mouth nightly as needed for Anxiety. Prn insomnia 60 tablet 1     No current facility-administered medications on file prior to visit.        Review of systems:  No fever chills.  No weakness.    Vitals:    09/07/18 1105   BP: 132/86   Pulse: 87   Temp: 98.7 °F (37.1 °C)   Weight: 135.2 kg (298 lb)   Height: 6' 3" (1.905 m)       Wt Readings from Last 3 Encounters:   09/07/18 135.2 kg (298 lb)   08/31/18 136.1 kg (300 lb)   08/28/18 135.2 kg (298 lb)       APPEARANCE: Well nourished, well developed, in no acute distress.    HEAD: Normocephalic.  Atraumatic.  EYES:   Right eye: Pupil reactive.  Conjunctiva clear.    Left eye: Pupil reactive.  Conjunctiva clear.    NECK: Supple. No bruits.  No JVD.  No cervical lymphadenopathy.  No thyromegaly.    CHEST: Breath sounds clear bilaterally.  Normal respiratory effort  CARDIOVASCULAR: Normal rate.  Regular rhythm.  No murmurs.  No rub.  No gallops.   No edema.  MENTAL STATUS: Alert.  Oriented x 3.  Healing laceration dorsum of the left foot.  No evidence infection.  He has full range of motion with flexion and extension of all the toes.  There is no fluctuance or discharge.              Gopi was seen today for er follow up.    Diagnoses and all orders for this visit:    Laceration of left foot, subsequent encounter    Depression, unspecified depression " type    Obstructive sleep apnea syndrome    Other orders  -     buPROPion (WELLBUTRIN XL) 300 MG 24 hr tablet; Take 1 tablet (300 mg total) by mouth once daily.     Keep area of the cut clean and may use dressing until healed with Vaseline.  Follow-up as needed if not resolving.  We will increase his Wellbutrin as above.  He is planning on scheduling a follow-up with psychiatrist.  Recommended follow-up with me if he has not seen the psychiatrist within the next month or so.

## 2018-10-31 ENCOUNTER — TELEPHONE (OUTPATIENT)
Dept: FAMILY MEDICINE | Facility: CLINIC | Age: 58
End: 2018-10-31

## 2018-10-31 RX ORDER — ESCITALOPRAM OXALATE 10 MG/1
10 TABLET ORAL DAILY
Qty: 90 TABLET | Refills: 0 | Status: SHIPPED | OUTPATIENT
Start: 2018-10-31 | End: 2019-03-08 | Stop reason: SDUPTHER

## 2018-10-31 NOTE — TELEPHONE ENCOUNTER
Spoke with pt. Will continue wellbutrin xl 300, but restart lexapro. has appt with psychiatry Dec 5

## 2018-11-01 ENCOUNTER — CLINICAL SUPPORT (OUTPATIENT)
Dept: FAMILY MEDICINE | Facility: CLINIC | Age: 58
End: 2018-11-01
Payer: OTHER GOVERNMENT

## 2018-11-01 DIAGNOSIS — Z23 IMMUNIZATION DUE: Primary | ICD-10-CM

## 2018-11-01 PROCEDURE — 90686 IIV4 VACC NO PRSV 0.5 ML IM: CPT | Mod: PBBFAC,PO

## 2018-11-01 NOTE — PROGRESS NOTES
Pt received flu immunization to the right deltoid. Pt instructed to wait 15 min post injection. Pt verb understanding and tolerated well.

## 2018-11-15 ENCOUNTER — OFFICE VISIT (OUTPATIENT)
Dept: CARDIOLOGY | Facility: CLINIC | Age: 58
End: 2018-11-15
Payer: OTHER GOVERNMENT

## 2018-11-15 VITALS
SYSTOLIC BLOOD PRESSURE: 140 MMHG | DIASTOLIC BLOOD PRESSURE: 77 MMHG | WEIGHT: 304.44 LBS | BODY MASS INDEX: 37.07 KG/M2 | HEIGHT: 76 IN | HEART RATE: 88 BPM

## 2018-11-15 DIAGNOSIS — I10 ESSENTIAL HYPERTENSION: ICD-10-CM

## 2018-11-15 DIAGNOSIS — E78.5 HYPERLIPIDEMIA, UNSPECIFIED HYPERLIPIDEMIA TYPE: ICD-10-CM

## 2018-11-15 DIAGNOSIS — G47.33 OBSTRUCTIVE SLEEP APNEA SYNDROME: ICD-10-CM

## 2018-11-15 DIAGNOSIS — I25.10 MILD CAD: Primary | ICD-10-CM

## 2018-11-15 DIAGNOSIS — I25.2 MYOCARDIAL INFARCT, OLD: ICD-10-CM

## 2018-11-15 PROCEDURE — 93010 ELECTROCARDIOGRAM REPORT: CPT | Mod: S$PBB,,, | Performed by: INTERNAL MEDICINE

## 2018-11-15 PROCEDURE — 99213 OFFICE O/P EST LOW 20 MIN: CPT | Mod: S$PBB,,, | Performed by: NURSE PRACTITIONER

## 2018-11-15 PROCEDURE — 99999 PR PBB SHADOW E&M-EST. PATIENT-LVL IV: CPT | Mod: PBBFAC,,, | Performed by: NURSE PRACTITIONER

## 2018-11-15 PROCEDURE — 93005 ELECTROCARDIOGRAM TRACING: CPT | Mod: PBBFAC,PO | Performed by: INTERNAL MEDICINE

## 2018-11-15 PROCEDURE — 99214 OFFICE O/P EST MOD 30 MIN: CPT | Mod: PBBFAC,PO,25 | Performed by: NURSE PRACTITIONER

## 2018-11-15 NOTE — PROGRESS NOTES
"Subjective:    Patient ID:  Gopi Garcia is a 58 y.o. male who presents for follow-up of Follow-up      HPI: Mr. Gopi Garcia presents to the clinic for follow up of CAD, HTN, HLP. he stated that he is doing very well; he denied any chest pain or SOB.  DASHA: on CPAP every night. He is followed by pulmonary on [facility]. Settings current; he is compliant.    Hx of CAD (no stent), HTN, HLP, obesity, DASHA.    He moved to [place] from [place] in 12/2014. He then started [facility] care.    He states he had MI in 2006 and went to [facility], had [facility] and was told "nonobstructive 30%".    10/9/2015 2D echo normal LVEF.    Medications: he is not missing any doses.  Sodium: he does not add salt to foods,  he is reading labels for sodium content, as well as fat content. Ham and cheese.  Dietary Fats: Fried foods once a week.  Dietary Sugars: One can of coke per day.  Exercise: he walks on treadmill for 20 minutes at a time, 5 days a week.  Tobacco:Quit tobacco in 2006; does chew nicotine gum.   Alcohol: 2 drinks per month.     Weight: (!) 138.1 kg (304 lb 7.3 oz) he states that his daily weights has been stable Body mass index is 37.06 kg/m².  Wt Readings from Last 3 Encounters:   11/15/18 (!) 138.1 kg (304 lb 7.3 oz)   09/07/18 135.2 kg (298 lb)   08/31/18 136.1 kg (300 lb)     BP log: None. He has not been monitoring his blood pressure at home.    Review of Systems   Constitution: Negative for chills, decreased appetite, fever, night sweats, weight gain and weight loss.   HENT: Negative for congestion.    Cardiovascular: Negative for chest pain, claudication, cyanosis, dyspnea on exertion, irregular heartbeat, leg swelling, near-syncope, orthopnea, palpitations, paroxysmal nocturnal dyspnea and syncope.   Respiratory: Negative for cough, hemoptysis, shortness of breath, sputum production and wheezing.    Hematologic/Lymphatic: Negative for adenopathy and bleeding problem. Bruises/bleeds easily (bruises " easily).   Skin: Negative for color change and nail changes.   Gastrointestinal: Negative for bloating, abdominal pain, change in bowel habit, heartburn, hematochezia, melena, nausea and vomiting.   Genitourinary: Negative for hematuria.   Neurological: Negative for dizziness and light-headedness.   Psychiatric/Behavioral: Negative for altered mental status.       Objective:   Physical Exam   Constitutional: He is oriented to person, place, and time. He appears well-developed and well-nourished. No distress.   HENT:   Head: Normocephalic and atraumatic.   Eyes: Conjunctivae are normal. No scleral icterus.   Neck: Neck supple. No JVD present. No tracheal deviation present. No thyromegaly present.   Cardiovascular: Normal rate, regular rhythm, normal heart sounds and intact distal pulses. Exam reveals no gallop and no friction rub.   No murmur heard.  Pulmonary/Chest: Effort normal and breath sounds normal. No respiratory distress. He has no wheezes. He has no rales. He exhibits no tenderness.   Abdominal: Soft. Bowel sounds are normal. He exhibits no distension and no mass. There is no tenderness. There is no rebound and no guarding.   Musculoskeletal: Normal range of motion. He exhibits no edema.   Lymphadenopathy:     He has no cervical adenopathy.   Neurological: He is alert and oriented to person, place, and time.   Skin: Skin is warm and dry. No rash noted. He is not diaphoretic. No erythema. No pallor.   Pink   Psychiatric: He has a normal mood and affect.   Results for ISAAC DHILLON (MRN [id number]) as of 11/15/2018 14:11   Ref. Range 3/22/2018 08:10   Sodium Latest Ref Range: 136 - 145 mmol/L 144   Potassium Latest Ref Range: 3.5 - 5.1 mmol/L 3.9   Chloride Latest Ref Range: 95 - 110 mmol/L 103   CO2 Latest Ref Range: 23 - 29 mmol/L 28   Anion Gap Latest Ref Range: 8 - 16 mmol/L 13   BUN, Bld Latest Ref Range: 6 - 20 mg/dL 12   Creatinine Latest Ref Range: 0.5 - 1.4 mg/dL 1.0   eGFR if non African  American Latest Ref Range: >60 mL/min/1.73 m^2 >60.0   eGFR if African American Latest Ref Range: >60 mL/min/1.73 m^2 >60.0   Glucose Latest Ref Range: 70 - 110 mg/dL 98   Calcium Latest Ref Range: 8.7 - 10.5 mg/dL 9.0   Alkaline Phosphatase Latest Ref Range: 55 - 135 U/L 80   Total Protein Latest Ref Range: 6.0 - 8.4 g/dL 6.8   Albumin Latest Ref Range: 3.5 - 5.2 g/dL 4.1   Total Bilirubin Latest Ref Range: 0.1 - 1.0 mg/dL 1.3 (H)   AST Latest Ref Range: 10 - 40 U/L 16   ALT Latest Ref Range: 10 - 44 U/L 9 (L)   Triglycerides Latest Ref Range: 30 - 150 mg/dL 174 (H)   Cholesterol Latest Ref Range: 120 - 199 mg/dL 144   HDL Latest Ref Range: 40 - 75 mg/dL 39 (L)   HDL/Chol Ratio Latest Ref Range: 20.0 - 50.0 % 27.1   LDL Cholesterol Latest Ref Range: 63.0 - 159.0 mg/dL 70.2   Non-HDL Cholesterol Latest Units: mg/dL 105   Total Cholesterol/HDL Ratio Latest Ref Range: 2.0 - 5.0  3.7          Assessment:      1. Mild CAD    2. Myocardial infarct, old    3. Hyperlipidemia, unspecified hyperlipidemia type    4. Essential hypertension    5. BMI 37.0-37.9, adult    6. Obstructive sleep apnea syndrome        Plan:   EKG today: NSR; Q waves in leads II, III, and aVF, V5, and V6 (not new)  Reveiwed lab results from 3/2018 with him. Triglycerides elevated-encouraged to reduce sugars and starches.  Continue current medications as directed.   Sodium restriction.  Whole foods diet. Discussed DASH diet and building a plate with appropriate portions. Reduce intake of hidden sugars-read labels.   Monitor BP at home. Send BP log via portal in one week.  Continue Exercise; add resistance exercises.  Continue CPAP every night.  Follow up in 6 months or call sooner for any problems.

## 2018-11-16 RX ORDER — LISINOPRIL AND HYDROCHLOROTHIAZIDE 20; 25 MG/1; MG/1
TABLET ORAL
Qty: 90 TABLET | Refills: 1 | Status: SHIPPED | OUTPATIENT
Start: 2018-11-16 | End: 2019-05-15 | Stop reason: SDUPTHER

## 2018-11-19 ENCOUNTER — TELEPHONE (OUTPATIENT)
Dept: DERMATOLOGY | Facility: CLINIC | Age: 58
End: 2018-11-19

## 2018-11-19 ENCOUNTER — PATIENT MESSAGE (OUTPATIENT)
Dept: DERMATOLOGY | Facility: CLINIC | Age: 58
End: 2018-11-19

## 2018-11-19 NOTE — TELEPHONE ENCOUNTER
MARIA EUGENIA and sent message through portal in reference to calling the clinic to have appt on 12/10/18 rescheduled with Dr Rojas.

## 2018-12-28 RX ORDER — MELOXICAM 7.5 MG/1
TABLET ORAL
Qty: 90 TABLET | Refills: 3 | Status: SHIPPED | OUTPATIENT
Start: 2018-12-28 | End: 2019-12-26

## 2019-01-22 ENCOUNTER — OFFICE VISIT (OUTPATIENT)
Dept: DERMATOLOGY | Facility: CLINIC | Age: 59
End: 2019-01-22
Payer: OTHER GOVERNMENT

## 2019-01-22 DIAGNOSIS — L82.1 SEBORRHEIC KERATOSIS: ICD-10-CM

## 2019-01-22 DIAGNOSIS — B35.3 TINEA PEDIS OF BOTH FEET: ICD-10-CM

## 2019-01-22 DIAGNOSIS — L91.8 ACROCHORDON: Primary | ICD-10-CM

## 2019-01-22 PROCEDURE — 99999 PR PBB SHADOW E&M-EST. PATIENT-LVL II: ICD-10-PCS | Mod: PBBFAC,,, | Performed by: DERMATOLOGY

## 2019-01-22 PROCEDURE — 99213 PR OFFICE/OUTPT VISIT, EST, LEVL III, 20-29 MIN: ICD-10-PCS | Mod: S$PBB,,, | Performed by: DERMATOLOGY

## 2019-01-22 PROCEDURE — 99213 OFFICE O/P EST LOW 20 MIN: CPT | Mod: S$PBB,,, | Performed by: DERMATOLOGY

## 2019-01-22 PROCEDURE — 99212 OFFICE O/P EST SF 10 MIN: CPT | Mod: PBBFAC,PN | Performed by: DERMATOLOGY

## 2019-01-22 PROCEDURE — 99999 PR PBB SHADOW E&M-EST. PATIENT-LVL II: CPT | Mod: PBBFAC,,, | Performed by: DERMATOLOGY

## 2019-01-22 RX ORDER — ECONAZOLE NITRATE 10 MG/G
CREAM TOPICAL
Qty: 85 G | Refills: 1 | Status: SHIPPED | OUTPATIENT
Start: 2019-01-22 | End: 2019-12-16

## 2019-01-22 NOTE — PROGRESS NOTES
Subjective:       Patient ID:  Gopi Garcia is a 58 y.o. male who presents for   Chief Complaint   Patient presents with    Skin Check     TBSE    Skin Tags     skin tags right upper thigh, forehead, arm pits      Hx of AK's, last seen on 7/24/17.  He c/o several skin tags of the right thigh.  + irritation. No tx tried.     Mother c/ hx of BCC. The patient denies personal history of skin cancer.          Review of Systems   Constitutional: Negative for fever and chills.   Gastrointestinal: Negative for nausea and vomiting.   Skin: Positive for activity-related sunscreen use. Negative for daily sunscreen use and recent sunburn.   Hematologic/Lymphatic: Does not bruise/bleed easily.        Objective:    Physical Exam   Constitutional: He appears well-developed and well-nourished. No distress.   Neurological: He is alert and oriented to person, place, and time. He is not disoriented.   Psychiatric: He has a normal mood and affect.   Skin:   Areas Examined (abnormalities noted in diagram):   Head / Face Inspection Performed  Neck Inspection Performed  Chest / Axilla Inspection Performed  Abdomen Inspection Performed  Back Inspection Performed  RUE Inspected  LUE Inspection Performed  RLE Inspected  LLE Inspection Performed  Nails and Digits Inspection Performed                           Diagram Legend     Erythematous scaling macule/papule c/w actinic keratosis       Vascular papule c/w angioma      Pigmented verrucoid papule/plaque c/w seborrheic keratosis      Yellow umbilicated papule c/w sebaceous hyperplasia      Irregularly shaped tan macule c/w lentigo     1-2 mm smooth white papules consistent with Milia      Movable subcutaneous cyst with punctum c/w epidermal inclusion cyst      Subcutaneous movable cyst c/w pilar cyst      Firm pink to brown papule c/w dermatofibroma      Pedunculated fleshy papule(s) c/w skin tag(s)      Evenly pigmented macule c/w junctional nevus     Mildly variegated  pigmented, slightly irregular-bordered macule c/w mildly atypical nevus      Flesh colored to evenly pigmented papule c/w intradermal nevus       Pink pearly papule/plaque c/w basal cell carcinoma      Erythematous hyperkeratotic cursted plaque c/w SCC      Surgical scar with no sign of skin cancer recurrence      Open and closed comedones      Inflammatory papules and pustules      Verrucoid papule consistent consistent with wart     Erythematous eczematous patches and plaques     Dystrophic onycholytic nail with subungual debris c/w onychomycosis     Umbilicated papule    Erythematous-base heme-crusted tan verrucoid plaque consistent with inflamed seborrheic keratosis     Erythematous Silvery Scaling Plaque c/w Psoriasis     See annotation      Assessment / Plan:        Acrochordon  Reassurance given.  Lesions are benign.    Seborrheic keratosis  Reassurance given. Discussed diagnosis and that lesions are benign.  AAD handout given.     Tinea pedis of both feet  -     econazole nitrate 1 % cream; AAA of feet bid  Dispense: 85 g; Refill: 1             Follow-up in about 1 year (around 1/22/2020).

## 2019-02-07 DIAGNOSIS — I25.10 CORONARY ARTERY DISEASE INVOLVING NATIVE CORONARY ARTERY OF NATIVE HEART WITHOUT ANGINA PECTORIS: ICD-10-CM

## 2019-02-07 DIAGNOSIS — E78.2 MIXED HYPERLIPIDEMIA: ICD-10-CM

## 2019-02-07 RX ORDER — ATORVASTATIN CALCIUM 40 MG/1
TABLET, FILM COATED ORAL
Qty: 90 TABLET | Refills: 0 | Status: SHIPPED | OUTPATIENT
Start: 2019-02-07 | End: 2019-12-11 | Stop reason: SDUPTHER

## 2019-02-07 NOTE — TELEPHONE ENCOUNTER
----- Message from Shari Doyle sent at 2/7/2019  1:37 PM CST -----  Contact: pt  Type:  Patient Requesting Referral    Who Called:Patient  Does the patient already have the specialty appointment scheduled?:No  If yes, what is the date of that appointment?na  Referral to What Specialty:Pain Mgmt/  Reason for Referral:Back Pain  Does the patient want the referral with a specific physician?:Dr Noe Lindsay/[phone]/Fax # [phone]  Is the specialist [facility]  Patient Requesting a Response?:Yes  Would the patient rather a call back or a response via [facility]? Call back  Best Call Back Number:pt @ [phone]  Additional Information: na

## 2019-02-17 RX ORDER — BUPROPION HYDROCHLORIDE 300 MG/1
TABLET ORAL
Qty: 90 TABLET | Refills: 1 | Status: SHIPPED | OUTPATIENT
Start: 2019-02-17 | End: 2019-08-16 | Stop reason: SDUPTHER

## 2019-03-08 ENCOUNTER — TELEPHONE (OUTPATIENT)
Dept: PSYCHIATRY | Facility: CLINIC | Age: 59
End: 2019-03-08

## 2019-03-08 RX ORDER — ESCITALOPRAM OXALATE 10 MG/1
TABLET ORAL
Qty: 90 TABLET | Refills: 1 | Status: SHIPPED | OUTPATIENT
Start: 2019-03-08 | End: 2019-08-17 | Stop reason: SDUPTHER

## 2019-03-08 RX ORDER — NITROGLYCERIN 0.4 MG/1
0.4 TABLET SUBLINGUAL EVERY 5 MIN PRN
Qty: 25 TABLET | Refills: 5 | Status: SHIPPED | OUTPATIENT
Start: 2019-03-08 | End: 2021-07-02 | Stop reason: SDUPTHER

## 2019-03-08 NOTE — TELEPHONE ENCOUNTER
----- Message from Kg Cortez sent at 3/8/2019 10:43 AM CST -----  Contact: [phone]   Pt would like refills called in on Rx medication for Nitro Glycerin. Please call back at [phone]. x-              Pt Uses:    [facility] [place] - [address] - [id number] [facility]  [place] [address]  Phone: [phone] Fax: [phone]

## 2019-03-08 NOTE — TELEPHONE ENCOUNTER
----- Message from Kg Cortez sent at 3/8/2019 10:41 AM CST -----  Contact: [phone]   Pt would like refills called in on Rx medication clonpine 1 mg.  Please call back at [phone].  x-           Pt Uses:    [facility] - [place] - [id number] [facility]  [place] [address]  Phone: [phone] Fax: [phone]

## 2019-03-08 NOTE — TELEPHONE ENCOUNTER
Left msg for pt to c/b and yaw - required for Rx renewal since it has been over 6 months since being seen (7/6/17)

## 2019-03-15 ENCOUNTER — TELEPHONE (OUTPATIENT)
Dept: FAMILY MEDICINE | Facility: CLINIC | Age: 59
End: 2019-03-15

## 2019-03-15 DIAGNOSIS — M54.9 BACK PAIN, UNSPECIFIED BACK LOCATION, UNSPECIFIED BACK PAIN LATERALITY, UNSPECIFIED CHRONICITY: Primary | ICD-10-CM

## 2019-03-15 DIAGNOSIS — Z09 FOLLOW UP: ICD-10-CM

## 2019-03-19 ENCOUNTER — PATIENT MESSAGE (OUTPATIENT)
Dept: FAMILY MEDICINE | Facility: CLINIC | Age: 59
End: 2019-03-19

## 2019-05-08 DIAGNOSIS — E78.2 MIXED HYPERLIPIDEMIA: ICD-10-CM

## 2019-05-08 DIAGNOSIS — I25.10 CORONARY ARTERY DISEASE INVOLVING NATIVE CORONARY ARTERY OF NATIVE HEART WITHOUT ANGINA PECTORIS: ICD-10-CM

## 2019-05-08 RX ORDER — ATORVASTATIN CALCIUM 40 MG/1
TABLET, FILM COATED ORAL
Qty: 90 TABLET | Refills: 0 | OUTPATIENT
Start: 2019-05-08

## 2019-05-15 RX ORDER — LISINOPRIL AND HYDROCHLOROTHIAZIDE 20; 25 MG/1; MG/1
TABLET ORAL
Qty: 90 TABLET | Refills: 0 | Status: SHIPPED | OUTPATIENT
Start: 2019-05-15 | End: 2019-11-20 | Stop reason: SDUPTHER

## 2019-06-18 ENCOUNTER — TELEPHONE (OUTPATIENT)
Dept: FAMILY MEDICINE | Facility: CLINIC | Age: 59
End: 2019-06-18

## 2019-06-18 NOTE — TELEPHONE ENCOUNTER
----- Message from Nereyda Andrews sent at 6/18/2019  1:36 PM CDT -----  Contact: Dr Noe Haynes Ms has been waiting on an authorization for Dr Lindsay, please call her back at [phone]. Thank you

## 2019-06-18 NOTE — TELEPHONE ENCOUNTER
renee informed Dr Spencer did referral but it is still pending so message sent to referral department to work, she can check back at end of week.

## 2019-06-27 ENCOUNTER — TELEPHONE (OUTPATIENT)
Dept: FAMILY MEDICINE | Facility: CLINIC | Age: 59
End: 2019-06-27

## 2019-06-27 NOTE — TELEPHONE ENCOUNTER
----- Message from Nichelle Mak sent at 6/27/2019  2:51 PM CDT -----  Contact: ms zelaya-dr jose miguel diaz is stating that referral still hasn't been received. please reutn call to renee at [phone]

## 2019-06-27 NOTE — TELEPHONE ENCOUNTER
----- Message from Nichelle Mak sent at 6/27/2019  1:56 PM CDT -----  Contact: [facility]  needs call back regarding status of cpap supplies, sent 6/26 to [phone]...[phone]

## 2019-07-01 ENCOUNTER — PATIENT MESSAGE (OUTPATIENT)
Dept: FAMILY MEDICINE | Facility: CLINIC | Age: 59
End: 2019-07-01

## 2019-08-13 ENCOUNTER — OFFICE VISIT (OUTPATIENT)
Dept: SLEEP MEDICINE | Facility: CLINIC | Age: 59
End: 2019-08-13
Payer: OTHER GOVERNMENT

## 2019-08-13 VITALS
RESPIRATION RATE: 18 BRPM | WEIGHT: 308 LBS | SYSTOLIC BLOOD PRESSURE: 140 MMHG | OXYGEN SATURATION: 96 % | DIASTOLIC BLOOD PRESSURE: 90 MMHG | HEART RATE: 74 BPM | BODY MASS INDEX: 37.51 KG/M2 | HEIGHT: 76 IN

## 2019-08-13 DIAGNOSIS — I10 ESSENTIAL HYPERTENSION: ICD-10-CM

## 2019-08-13 DIAGNOSIS — G47.33 OBSTRUCTIVE SLEEP APNEA SYNDROME: Primary | ICD-10-CM

## 2019-08-13 PROCEDURE — 99214 PR OFFICE/OUTPT VISIT, EST, LEVL IV, 30-39 MIN: ICD-10-PCS | Mod: S$PBB,,, | Performed by: NURSE PRACTITIONER

## 2019-08-13 PROCEDURE — 99999 PR PBB SHADOW E&M-EST. PATIENT-LVL IV: CPT | Mod: PBBFAC,,, | Performed by: NURSE PRACTITIONER

## 2019-08-13 PROCEDURE — 99214 OFFICE O/P EST MOD 30 MIN: CPT | Mod: PBBFAC | Performed by: NURSE PRACTITIONER

## 2019-08-13 PROCEDURE — 99214 OFFICE O/P EST MOD 30 MIN: CPT | Mod: S$PBB,,, | Performed by: NURSE PRACTITIONER

## 2019-08-13 PROCEDURE — 99999 PR PBB SHADOW E&M-EST. PATIENT-LVL IV: ICD-10-PCS | Mod: PBBFAC,,, | Performed by: NURSE PRACTITIONER

## 2019-08-13 RX ORDER — LISINOPRIL AND HYDROCHLOROTHIAZIDE 20; 25 MG/1; MG/1
TABLET ORAL
Qty: 90 TABLET | Refills: 0 | OUTPATIENT
Start: 2019-08-13

## 2019-08-13 RX ORDER — ACETAMINOPHEN 500 MG
1 TABLET ORAL
COMMUNITY
End: 2020-05-05 | Stop reason: SDUPTHER

## 2019-08-13 NOTE — PROGRESS NOTES
"Subjective:      Patient ID: Gopi Garcia is a 58 y.o. male.    Chief Complaint: Sleep Apnea    HPI  Presents to office for review of AutoPAP therapy. Patient states improved symptoms with use of AutoPAP. Sleeping more soundly. Waking up feeling more refreshed. Improved daytime sleepiness. Patient states he is benefiting from use of the AutoPAP.     Patient Active Problem List   Diagnosis    HTN (hypertension)    Hyperlipidemia    Anxiety    Myocardial infarct, old    Sleep apnea    Depression    Chronic low back pain    ED (erectile dysfunction)    Mild CAD    BMI 37.0-37.9, adult    AR (allergic rhinitis)       BP (!) 140/90   Pulse 74   Resp 18   Ht 6' 4" (1.93 m)   Wt (!) 139.7 kg (307 lb 15.7 oz)   SpO2 96%   BMI 37.49 kg/m²   Body mass index is 37.49 kg/m².    Review of Systems   Constitutional: Negative.    HENT: Negative.    Respiratory: Negative.    Cardiovascular: Negative.    Musculoskeletal: Negative.    Gastrointestinal: Negative.    Neurological: Negative.    Psychiatric/Behavioral: Negative.      Objective:      Physical Exam   Constitutional: He is oriented to person, place, and time. He appears well-developed and well-nourished.   HENT:   Head: Normocephalic and atraumatic.   Mouth/Throat: Oropharynx is clear and moist.   Mallampati Score: III     Neck: Normal range of motion. Neck supple.   Cardiovascular: Normal rate and regular rhythm.   Pulmonary/Chest: Effort normal and breath sounds normal.   Abdominal: Soft. He exhibits no distension.   Musculoskeletal: He exhibits no edema.   Neurological: He is alert and oriented to person, place, and time.   Skin: Skin is warm and dry.   Psychiatric: He has a normal mood and affect.     Personal Diagnostic Review  Compliance Information  Gopi Garcia  7/8/2019 - 8/6/2019  YOB: 1960  Mask:  Compliance Summary  7/8/2019 - 8/6/2019 (30 days)  Days with Device Usage 30 days  Days without Device Usage 0 days  Percent " Days with Device Usage 100.0%  Cumulative Usage 9 days 3 hrs. 19 mins. 19 secs.  Maximum Usage (1 Day) 9 hrs. 46 mins. 15 secs.  Average Usage (All Days) 7 hrs. 18 mins. 38 secs.  Average Usage (Days Used) 7 hrs. 18 mins. 38 secs.  Minimum Usage (1 Day) 31 mins. 36 secs.  Percent of Days with Usage >= 4 Hours 93.3%  Percent of Days with Usage < 4 Hours 6.7%  Date Range  Total Blower Time 9 days 3 hrs. 37 mins. 35 secs.  Average AHI 1.9  Auto-CPAP Summary  Auto-CPAP Mean Pressure 9.7 cmH2O  Auto-CPAP Peak Average Pressure 11.5 cmH2O  Average Device Pressure <= 90% of Time 11.5 cmH2O  Average Time in Large Leak Per Day 12 secs    Assessment:       1. Obstructive sleep apnea syndrome    2. BMI 37.0-37.9, adult    3. Essential hypertension        Outpatient Encounter Medications as of 8/13/2019   Medication Sig Dispense Refill    aspirin (ECOTRIN) 81 MG EC tablet Take 81 mg by mouth once daily.      atorvastatin (LIPITOR) 40 MG tablet TAKE 1 TABLET EVERY EVENING 90 tablet 0    buPROPion (WELLBUTRIN XL) 300 MG 24 hr tablet TAKE 1 TABLET DAILY 90 tablet 1    cetirizine (ZYRTEC) 10 MG tablet       econazole nitrate 1 % cream AAA of feet bid 85 g 1    escitalopram oxalate (LEXAPRO) 10 MG tablet TAKE 1 TABLET DAILY 90 tablet 1    fluticasone (FLONASE) 50 mcg/actuation nasal spray Use 2 sprays to each nostril daily 16 g 2    lisinopril-hydrochlorothiazide (PRINZIDE,ZESTORETIC) 20-25 mg Tab TAKE 1 TABLET DAILY 90 tablet 0    meloxicam (MOBIC) 7.5 MG tablet TAKE 1 TABLET DAILY 90 tablet 3    montelukast (SINGULAIR) 10 mg tablet Take 10 mg by mouth every evening.  5    nitroGLYCERIN (NITROSTAT) 0.4 MG SL tablet Place 1 tablet (0.4 mg total) under the tongue every 5 (five) minutes as needed for Chest pain. Maximum 3 pills.  Don't take if using Viagra 25 tablet 5    omega 3-dha-epa-fish oil 300-1,000 mg Cap Take 1 g by mouth.      omega-3 fatty acids-vitamin E (FISH OIL) 1,000 mg Cap Take by mouth.       oxycodone-acetaminophen (PERCOCET)  mg per tablet Take 1 tablet by mouth 4 (four) times daily.  0    sildenafil (VIAGRA) 100 MG tablet Take 1 tablet (100 mg total) by mouth daily as needed for Erectile Dysfunction (Don't take if using any nitroglycerin). 18 tablet 3    clonazePAM (KLONOPIN) 1 MG tablet Take 1 tablet (1 mg total) by mouth nightly as needed for Anxiety. Prn insomnia 60 tablet 1     No facility-administered encounter medications on file as of 8/13/2019.      Orders Placed This Encounter   Procedures    CPAP/BIPAP SUPPLIES     Order Specific Question:   Type of mask:     Answer:   Nasal     Order Specific Question:   Headgear?     Answer:   Yes     Order Specific Question:   Tubing?     Answer:   Yes     Order Specific Question:   Humidifier chamber?     Answer:   Yes     Order Specific Question:   Chin strap?     Answer:   Yes     Order Specific Question:   Filters?     Answer:   Yes     Order Specific Question:   Cushions?     Answer:   Yes     Order Specific Question:   Length of need (1-99 months):     Answer:   99     Plan:        Problem List Items Addressed This Visit        Cardiac/Vascular    HTN (hypertension)    Current Assessment & Plan     140/90 today. Monitor and follow up PCP            Endocrine    BMI 37.0-37.9, adult    Current Assessment & Plan     Weight loss and exercise to improve overall health.              Other    Sleep apnea - Primary    Overview     Autopap         Current Assessment & Plan     Doing well on PAP settings. Patient is compliant. Follow up in 12 months with PAP data download or call earlier if any problems.           Relevant Orders    CPAP/BIPAP SUPPLIES

## 2019-08-16 RX ORDER — BUPROPION HYDROCHLORIDE 300 MG/1
TABLET ORAL
Qty: 90 TABLET | Refills: 4 | Status: SHIPPED | OUTPATIENT
Start: 2019-08-16 | End: 2020-11-09

## 2019-08-18 RX ORDER — ESCITALOPRAM OXALATE 10 MG/1
TABLET ORAL
Qty: 90 TABLET | Refills: 4 | Status: SHIPPED | OUTPATIENT
Start: 2019-08-18 | End: 2019-12-16

## 2019-08-23 ENCOUNTER — TELEPHONE (OUTPATIENT)
Dept: INTERNAL MEDICINE | Facility: CLINIC | Age: 59
End: 2019-08-23

## 2019-08-23 NOTE — TELEPHONE ENCOUNTER
Working HTN report. Pt did not answer. Left a message for pt to call and schedule an appointment with Dr. Paul Spencer for hypertension.

## 2019-11-20 RX ORDER — LISINOPRIL AND HYDROCHLOROTHIAZIDE 20; 25 MG/1; MG/1
1 TABLET ORAL DAILY
Qty: 90 TABLET | Refills: 0 | Status: SHIPPED | OUTPATIENT
Start: 2019-11-20 | End: 2019-12-26 | Stop reason: SDUPTHER

## 2019-11-20 NOTE — TELEPHONE ENCOUNTER
----- Message from Fanny Wallace sent at 11/20/2019 11:07 AM CST -----  Pt needed an appointmnt before BP pills could be called in. Pt was unavailable for an appointment until December 16th, but the pt is out of the BP pills. Pt needs a refill called into [facility] ASAP until he can see the

## 2019-11-20 NOTE — TELEPHONE ENCOUNTER
Patient has been out of meds, he has appt scheduled 12/16, asking for supply to last until then, please advise.

## 2019-12-10 ENCOUNTER — PATIENT MESSAGE (OUTPATIENT)
Dept: FAMILY MEDICINE | Facility: CLINIC | Age: 59
End: 2019-12-10

## 2019-12-10 DIAGNOSIS — E78.2 MIXED HYPERLIPIDEMIA: ICD-10-CM

## 2019-12-10 DIAGNOSIS — I25.10 CORONARY ARTERY DISEASE INVOLVING NATIVE CORONARY ARTERY OF NATIVE HEART WITHOUT ANGINA PECTORIS: ICD-10-CM

## 2019-12-11 RX ORDER — ATORVASTATIN CALCIUM 40 MG/1
40 TABLET, FILM COATED ORAL NIGHTLY
Qty: 90 TABLET | Refills: 0 | Status: SHIPPED | OUTPATIENT
Start: 2019-12-11 | End: 2020-03-10

## 2019-12-16 ENCOUNTER — OFFICE VISIT (OUTPATIENT)
Dept: FAMILY MEDICINE | Facility: CLINIC | Age: 59
End: 2019-12-16
Payer: OTHER GOVERNMENT

## 2019-12-16 VITALS
HEIGHT: 76 IN | DIASTOLIC BLOOD PRESSURE: 61 MMHG | BODY MASS INDEX: 37.26 KG/M2 | SYSTOLIC BLOOD PRESSURE: 133 MMHG | WEIGHT: 306 LBS | TEMPERATURE: 98 F | HEART RATE: 60 BPM

## 2019-12-16 DIAGNOSIS — I25.10 MILD CAD: ICD-10-CM

## 2019-12-16 DIAGNOSIS — E66.01 SEVERE OBESITY (BMI 35.0-35.9 WITH COMORBIDITY): ICD-10-CM

## 2019-12-16 DIAGNOSIS — Z12.9 SCREENING FOR CANCER: ICD-10-CM

## 2019-12-16 DIAGNOSIS — F33.0 MILD EPISODE OF RECURRENT MAJOR DEPRESSIVE DISORDER: ICD-10-CM

## 2019-12-16 DIAGNOSIS — F41.9 ANXIETY: ICD-10-CM

## 2019-12-16 DIAGNOSIS — Z12.5 SCREENING FOR PROSTATE CANCER: ICD-10-CM

## 2019-12-16 DIAGNOSIS — E78.5 HYPERLIPIDEMIA, UNSPECIFIED HYPERLIPIDEMIA TYPE: ICD-10-CM

## 2019-12-16 DIAGNOSIS — Z00.00 ROUTINE HISTORY AND PHYSICAL EXAMINATION OF ADULT: Primary | ICD-10-CM

## 2019-12-16 DIAGNOSIS — I10 ESSENTIAL HYPERTENSION: ICD-10-CM

## 2019-12-16 DIAGNOSIS — G47.33 OBSTRUCTIVE SLEEP APNEA SYNDROME: ICD-10-CM

## 2019-12-16 PROCEDURE — 99213 OFFICE O/P EST LOW 20 MIN: CPT | Mod: PBBFAC,PO | Performed by: FAMILY MEDICINE

## 2019-12-16 PROCEDURE — 99999 PR PBB SHADOW E&M-EST. PATIENT-LVL III: CPT | Mod: PBBFAC,,, | Performed by: FAMILY MEDICINE

## 2019-12-16 PROCEDURE — 99396 PREV VISIT EST AGE 40-64: CPT | Mod: S$PBB,,, | Performed by: FAMILY MEDICINE

## 2019-12-16 PROCEDURE — 90686 IIV4 VACC NO PRSV 0.5 ML IM: CPT | Mod: PBBFAC,PO

## 2019-12-16 PROCEDURE — 99999 PR PBB SHADOW E&M-EST. PATIENT-LVL III: ICD-10-PCS | Mod: PBBFAC,,, | Performed by: FAMILY MEDICINE

## 2019-12-16 PROCEDURE — 99396 PR PREVENTIVE VISIT,EST,40-64: ICD-10-PCS | Mod: S$PBB,,, | Performed by: FAMILY MEDICINE

## 2019-12-16 RX ORDER — ESCITALOPRAM OXALATE 20 MG/1
20 TABLET ORAL DAILY
Qty: 90 TABLET | Refills: 1 | Status: SHIPPED | OUTPATIENT
Start: 2019-12-16 | End: 2020-07-22 | Stop reason: ALTCHOICE

## 2019-12-17 NOTE — PROGRESS NOTES
Patient presents physical exam.  Hypertension blood pressure controlled.  Hyperlipidemia due for laboratory.  Sleep apnea compliant CPAP.  Severe obesity reviewed.  Mild coronary disease followed by Cardiology.  Depression anxiety.  Interested in L methyl folate.. Symptoms partially controlled current Lexapro and Wellbutrin..  Having some irritability and forgetfulness.  Previously saw Psychiatry, but not seeing them now.    Gopi was seen today for annual exam and medication refill.    Diagnoses and all orders for this visit:    Routine history and physical examination of adult  -     Lipid panel; Future  -     Comprehensive metabolic panel; Future  -     PSA, Screening; Future  -     CBC auto differential; Future  -     TSH; Future    Essential hypertension  -     Lipid panel; Future  -     CBC auto differential; Future  -     TSH; Future    Hyperlipidemia, unspecified hyperlipidemia type  -     Comprehensive metabolic panel; Future    Obstructive sleep apnea syndrome    Severe obesity (BMI 35.0-35.9 with comorbidity)    Screening for cancer  -     CT Chest Lung Screening Low Dose; Future    Screening for prostate cancer  -     PSA, Screening; Future    Mild CAD    Mild episode of recurrent major depressive disorder    Anxiety    Other orders  -     escitalopram oxalate (LEXAPRO) 20 MG tablet; Take 1 tablet (20 mg total) by mouth once daily.  -     levomefolate-algal oil (DEPLIN, ALGAL OIL,) 7.5-90.314 mg Cap; Take 1 capsule by mouth once daily.  -     Influenza - Quadrivalent (PF)      Increase Lexapro.  Discussed lung cancer screening.  He is interested.  Anticipatory guidance: Don't smoke.  Healthy diet and regular exercise recommended.  Encourage weight loss.  Follow-up 1 month.        Past Medical History:  Past Medical History:   Diagnosis Date    Anxiety     AR (allergic rhinitis)     Chronic low back pain     Colon polyp 2010    told to repeat 5 yrs    Coronary artery disease     Depression     ED  (erectile dysfunction)     HTN (hypertension)     Hyperlipidemia     Myocardial infarct, old     Sleep apnea      Past Surgical History:   Procedure Laterality Date    CARDIAC CATHETERIZATION  2006    INGUINAL HERNIA REPAIR Right 2011    VASECTOMY  2005     Review of patient's allergies indicates:  No Known Allergies  Current Outpatient Medications on File Prior to Visit   Medication Sig Dispense Refill    aspirin (ECOTRIN) 81 MG EC tablet Take 81 mg by mouth once daily.      atorvastatin (LIPITOR) 40 MG tablet Take 1 tablet (40 mg total) by mouth every evening. 90 tablet 0    buPROPion (WELLBUTRIN XL) 300 MG 24 hr tablet TAKE 1 TABLET DAILY 90 tablet 4    lisinopril-hydrochlorothiazide (PRINZIDE,ZESTORETIC) 20-25 mg Tab Take 1 tablet by mouth once daily. 90 tablet 0    meloxicam (MOBIC) 7.5 MG tablet TAKE 1 TABLET DAILY 90 tablet 3    nitroGLYCERIN (NITROSTAT) 0.4 MG SL tablet Place 1 tablet (0.4 mg total) under the tongue every 5 (five) minutes as needed for Chest pain. Maximum 3 pills.  Don't take if using Viagra 25 tablet 5    omega 3-dha-epa-fish oil 300-1,000 mg Cap Take 1 g by mouth.      oxycodone-acetaminophen (PERCOCET)  mg per tablet Take 1 tablet by mouth 4 (four) times daily.  0    sildenafil (VIAGRA) 100 MG tablet Take 1 tablet (100 mg total) by mouth daily as needed for Erectile Dysfunction (Don't take if using any nitroglycerin). 18 tablet 3    [DISCONTINUED] escitalopram oxalate (LEXAPRO) 10 MG tablet TAKE 1 TABLET DAILY 90 tablet 4    clonazePAM (KLONOPIN) 1 MG tablet Take 1 tablet (1 mg total) by mouth nightly as needed for Anxiety. Prn insomnia 60 tablet 1    [DISCONTINUED] cetirizine (ZYRTEC) 10 MG tablet       [DISCONTINUED] econazole nitrate 1 % cream AAA of feet bid (Patient not taking: Reported on 12/16/2019) 85 g 1    [DISCONTINUED] fluticasone (FLONASE) 50 mcg/actuation nasal spray Use 2 sprays to each nostril daily (Patient not taking: Reported on 12/16/2019) 16  g 2    [DISCONTINUED] montelukast (SINGULAIR) 10 mg tablet Take 10 mg by mouth every evening.  5    [DISCONTINUED] omega-3 fatty acids-vitamin E (FISH OIL) 1,000 mg Cap Take by mouth.       No current facility-administered medications on file prior to visit.      Social History     Socioeconomic History    Marital status:      Spouse name: Not on file    Number of children: Not on file    Years of education: Not on file    Highest education level: Not on file   Occupational History    Not on file   Social Needs    Financial resource strain: Not on file    Food insecurity:     Worry: Not on file     Inability: Not on file    Transportation needs:     Medical: Not on file     Non-medical: Not on file   Tobacco Use    Smoking status: Former Smoker     Packs/day: 1.00     Years: 30.00     Pack years: 30.00     Last attempt to quit: 10/17/2007     Years since quittin.1    Smokeless tobacco: Never Used   Substance and Sexual Activity    Alcohol use: Yes     Alcohol/week: 6.0 standard drinks     Types: 6 Cans of beer per week    Drug use: No    Sexual activity: Yes   Lifestyle    Physical activity:     Days per week: Not on file     Minutes per session: Not on file    Stress: Not on file   Relationships    Social connections:     Talks on phone: Not on file     Gets together: Not on file     Attends Advent service: Not on file     Active member of club or organization: Not on file     Attends meetings of clubs or organizations: Not on file     Relationship status: Not on file   Other Topics Concern    Not on file   Social History Narrative    Not on file     Family History   Problem Relation Age of Onset    Lung cancer Father     Alzheimer's disease Mother     Heart attack Paternal Grandfather     Prostate cancer Brother              ROS:  GENERAL: No fever, chills,  or significant weight changes.  HEENT: No headache or hearing complaints.  No dysphagia  Eyes: No vision  "complaints  CHEST: Denies ROMANO, cyanosis, wheezing, cough and sputum production.  CARDIOVASCULAR: Denies chest pain, PND, orthopnea or reduced exercise tolerance.  ABDOMEN: Appetite fine. Denies diarrhea, abdominal pain, hematemesis or blood in stool.  URINARY: No flank pain, dysuria or hematuria.  MUSCULOSKELETAL:  Chronic back pain followed pain management  NEUROLOGIC: No focal weakness numbness or paresthesia  PSYCHIATRIC: Denies SI/HI        Vitals:    12/16/19 1108   BP: 133/61   Pulse: 60   Temp: 98 °F (36.7 °C)   Weight: (!) 138.8 kg (306 lb)   Height: 6' 4" (1.93 m)     Wt Readings from Last 3 Encounters:   12/16/19 (!) 138.8 kg (306 lb)   08/13/19 (!) 139.7 kg (307 lb 15.7 oz)   11/15/18 (!) 138.1 kg (304 lb 7.3 oz)       OBJECTIVE:   APPEARANCE: Well nourished, well developed, in no acute distress.    HEAD: Normocephalic.  Atraumatic.  No sinus tenderness.  EYES:   Right eye: Pupil reactive.  Conjunctiva clear.    Left eye: Pupil reactive.  Conjunctiva clear.  EOMI.    EARS: TM's intact. Light reflex normal. No retraction or perforation.    NOSE:  clear.  MOUTH & THROAT:  No pharyngeal erythema or exudate. No lesions.  NECK: Supple. No bruits.  No JVD.  No cervical lymphadenopathy.  No thyromegaly.    CHEST: Breath sounds clear bilaterally.  Normal respiratory effort  CARDIOVASCULAR: Normal rate.  Regular rhythm.  No murmurs.  No rub.  No gallops.  ABDOMEN: Bowel sounds normal.  Soft.  No tenderness.  No organomegaly.  PERIPHERAL VASCULAR: No cyanosis.  No clubbing.  No edema.  NEUROLOGIC: No focal findings.  MENTAL STATUS: Alert.  Oriented x 3.        "

## 2019-12-26 RX ORDER — LISINOPRIL AND HYDROCHLOROTHIAZIDE 20; 25 MG/1; MG/1
1 TABLET ORAL DAILY
Qty: 90 TABLET | Refills: 3 | Status: SHIPPED | OUTPATIENT
Start: 2019-12-26 | End: 2020-12-21

## 2019-12-26 RX ORDER — MELOXICAM 7.5 MG/1
TABLET ORAL
Qty: 90 TABLET | Refills: 3 | Status: SHIPPED | OUTPATIENT
Start: 2019-12-26 | End: 2020-11-09

## 2020-01-02 ENCOUNTER — HOSPITAL ENCOUNTER (OUTPATIENT)
Dept: RADIOLOGY | Facility: HOSPITAL | Age: 60
Discharge: HOME | End: 2020-01-02
Attending: FAMILY MEDICINE
Payer: OTHER GOVERNMENT

## 2020-01-02 DIAGNOSIS — Z12.9 SCREENING FOR CANCER: ICD-10-CM

## 2020-01-02 PROBLEM — R91.1 PULMONARY NODULE, LEFT: Status: ACTIVE | Noted: 2020-01-02

## 2020-01-02 PROCEDURE — G0297 LDCT FOR LUNG CA SCREEN: HCPCS | Mod: 26,,, | Performed by: RADIOLOGY

## 2020-01-02 PROCEDURE — G0297 CT CHEST LUNG SCREENING LOW DOSE: ICD-10-PCS | Mod: 26,,, | Performed by: RADIOLOGY

## 2020-01-02 PROCEDURE — G0297 LDCT FOR LUNG CA SCREEN: HCPCS | Mod: TC,PO

## 2020-01-14 ENCOUNTER — LAB VISIT (OUTPATIENT)
Dept: LAB | Facility: HOSPITAL | Age: 60
End: 2020-01-14
Attending: FAMILY MEDICINE
Payer: OTHER GOVERNMENT

## 2020-01-14 DIAGNOSIS — Z00.00 ROUTINE HISTORY AND PHYSICAL EXAMINATION OF ADULT: ICD-10-CM

## 2020-01-14 DIAGNOSIS — I10 ESSENTIAL HYPERTENSION: ICD-10-CM

## 2020-01-14 DIAGNOSIS — E78.5 HYPERLIPIDEMIA, UNSPECIFIED HYPERLIPIDEMIA TYPE: ICD-10-CM

## 2020-01-14 DIAGNOSIS — Z12.5 SCREENING FOR PROSTATE CANCER: ICD-10-CM

## 2020-01-14 LAB
ALBUMIN SERPL BCP-MCNC: 4.3 G/DL (ref 3.5–5.2)
ALP SERPL-CCNC: 83 U/L (ref 55–135)
ALT SERPL W/O P-5'-P-CCNC: 18 U/L (ref 10–44)
ANION GAP SERPL CALC-SCNC: 10 MMOL/L (ref 8–16)
AST SERPL-CCNC: 24 U/L (ref 10–40)
BASOPHILS # BLD AUTO: 0.08 K/UL (ref 0–0.2)
BASOPHILS NFR BLD: 1 % (ref 0–1.9)
BILIRUB SERPL-MCNC: 1.1 MG/DL (ref 0.1–1)
BUN SERPL-MCNC: 12 MG/DL (ref 6–20)
CALCIUM SERPL-MCNC: 9.2 MG/DL (ref 8.7–10.5)
CHLORIDE SERPL-SCNC: 101 MMOL/L (ref 95–110)
CHOLEST SERPL-MCNC: 161 MG/DL (ref 120–199)
CHOLEST/HDLC SERPL: 3.2 {RATIO} (ref 2–5)
CO2 SERPL-SCNC: 30 MMOL/L (ref 23–29)
COMPLEXED PSA SERPL-MCNC: 0.49 NG/ML (ref 0–4)
CREAT SERPL-MCNC: 1.1 MG/DL (ref 0.5–1.4)
DIFFERENTIAL METHOD: ABNORMAL
EOSINOPHIL # BLD AUTO: 0.2 K/UL (ref 0–0.5)
EOSINOPHIL NFR BLD: 2.8 % (ref 0–8)
ERYTHROCYTE [DISTWIDTH] IN BLOOD BY AUTOMATED COUNT: 13 % (ref 11.5–14.5)
EST. GFR  (AFRICAN AMERICAN): >60 ML/MIN/1.73 M^2
EST. GFR  (NON AFRICAN AMERICAN): >60 ML/MIN/1.73 M^2
GLUCOSE SERPL-MCNC: 124 MG/DL (ref 70–110)
HCT VFR BLD AUTO: 49.5 % (ref 40–54)
HDLC SERPL-MCNC: 50 MG/DL (ref 40–75)
HDLC SERPL: 31.1 % (ref 20–50)
HGB BLD-MCNC: 16 G/DL (ref 14–18)
IMM GRANULOCYTES # BLD AUTO: 0.02 K/UL (ref 0–0.04)
IMM GRANULOCYTES NFR BLD AUTO: 0.2 % (ref 0–0.5)
LDLC SERPL CALC-MCNC: 83.6 MG/DL (ref 63–159)
LYMPHOCYTES # BLD AUTO: 2.7 K/UL (ref 1–4.8)
LYMPHOCYTES NFR BLD: 32.1 % (ref 18–48)
MCH RBC QN AUTO: 31.3 PG (ref 27–31)
MCHC RBC AUTO-ENTMCNC: 32.3 G/DL (ref 32–36)
MCV RBC AUTO: 97 FL (ref 82–98)
MONOCYTES # BLD AUTO: 0.6 K/UL (ref 0.3–1)
MONOCYTES NFR BLD: 7.4 % (ref 4–15)
NEUTROPHILS # BLD AUTO: 4.7 K/UL (ref 1.8–7.7)
NEUTROPHILS NFR BLD: 56.5 % (ref 38–73)
NONHDLC SERPL-MCNC: 111 MG/DL
NRBC BLD-RTO: 0 /100 WBC
PLATELET # BLD AUTO: 279 K/UL (ref 150–350)
PMV BLD AUTO: 10.1 FL (ref 9.2–12.9)
POTASSIUM SERPL-SCNC: 4 MMOL/L (ref 3.5–5.1)
PROT SERPL-MCNC: 7 G/DL (ref 6–8.4)
RBC # BLD AUTO: 5.11 M/UL (ref 4.6–6.2)
SODIUM SERPL-SCNC: 141 MMOL/L (ref 136–145)
TRIGL SERPL-MCNC: 137 MG/DL (ref 30–150)
TSH SERPL DL<=0.005 MIU/L-ACNC: 0.88 UIU/ML (ref 0.4–4)
WBC # BLD AUTO: 8.33 K/UL (ref 3.9–12.7)

## 2020-01-14 PROCEDURE — 36415 COLL VENOUS BLD VENIPUNCTURE: CPT | Mod: PO

## 2020-01-14 PROCEDURE — 80061 LIPID PANEL: CPT

## 2020-01-14 PROCEDURE — 85025 COMPLETE CBC W/AUTO DIFF WBC: CPT

## 2020-01-14 PROCEDURE — 80053 COMPREHEN METABOLIC PANEL: CPT

## 2020-01-14 PROCEDURE — 84443 ASSAY THYROID STIM HORMONE: CPT

## 2020-01-14 PROCEDURE — 84153 ASSAY OF PSA TOTAL: CPT

## 2020-01-16 ENCOUNTER — OFFICE VISIT (OUTPATIENT)
Dept: FAMILY MEDICINE | Facility: CLINIC | Age: 60
End: 2020-01-16
Payer: OTHER GOVERNMENT

## 2020-01-16 VITALS
DIASTOLIC BLOOD PRESSURE: 89 MMHG | TEMPERATURE: 98 F | SYSTOLIC BLOOD PRESSURE: 137 MMHG | HEIGHT: 75 IN | HEART RATE: 99 BPM | WEIGHT: 309.94 LBS | BODY MASS INDEX: 38.54 KG/M2

## 2020-01-16 DIAGNOSIS — E66.01 SEVERE OBESITY (BMI 35.0-35.9 WITH COMORBIDITY): ICD-10-CM

## 2020-01-16 DIAGNOSIS — R73.09 ELEVATED GLUCOSE: ICD-10-CM

## 2020-01-16 DIAGNOSIS — F33.0 MILD EPISODE OF RECURRENT MAJOR DEPRESSIVE DISORDER: Primary | ICD-10-CM

## 2020-01-16 PROCEDURE — 99213 PR OFFICE/OUTPT VISIT, EST, LEVL III, 20-29 MIN: ICD-10-PCS | Mod: S$PBB,,, | Performed by: FAMILY MEDICINE

## 2020-01-16 PROCEDURE — 99213 OFFICE O/P EST LOW 20 MIN: CPT | Mod: S$PBB,,, | Performed by: FAMILY MEDICINE

## 2020-01-16 PROCEDURE — 99999 PR PBB SHADOW E&M-EST. PATIENT-LVL III: ICD-10-PCS | Mod: PBBFAC,,, | Performed by: FAMILY MEDICINE

## 2020-01-16 PROCEDURE — 99213 OFFICE O/P EST LOW 20 MIN: CPT | Mod: PBBFAC,PO | Performed by: FAMILY MEDICINE

## 2020-01-16 PROCEDURE — 99999 PR PBB SHADOW E&M-EST. PATIENT-LVL III: CPT | Mod: PBBFAC,,, | Performed by: FAMILY MEDICINE

## 2020-01-16 NOTE — PROGRESS NOTES
Presents being moved depression.  Doing well after recent increase in medication.  Denies side effects.  No SI/HI.  Reviewed recent laboratory.  He did have elevated glucose, but nondiabetic range.    Gopi was seen today for follow-up.    Diagnoses and all orders for this visit:    Mild episode of recurrent major depressive disorder    Elevated glucose  -     Hemoglobin A1c; Future  -     Glucose, fasting; Future    Severe obesity (BMI 35.0-35.9 with comorbidity)      Continue current medication.  Discussed diet.  Try to lose weight.  Recheck laboratory above in 6 months.            Past Medical History:  Past Medical History:   Diagnosis Date    Anxiety     AR (allergic rhinitis)     Chronic low back pain     Colon polyp 2010    told to repeat 5 yrs    Coronary artery disease     Depression     ED (erectile dysfunction)     HTN (hypertension)     Hyperlipidemia     Myocardial infarct, old     Pulmonary nodule, left 1/2/2020    Repeat CT January 2021    Sleep apnea      Past Surgical History:   Procedure Laterality Date    CARDIAC CATHETERIZATION  2006    INGUINAL HERNIA REPAIR Right 2011    VASECTOMY  2005     Review of patient's allergies indicates:  No Known Allergies  Current Outpatient Medications on File Prior to Visit   Medication Sig Dispense Refill    aspirin (ECOTRIN) 81 MG EC tablet Take 81 mg by mouth once daily.      atorvastatin (LIPITOR) 40 MG tablet Take 1 tablet (40 mg total) by mouth every evening. 90 tablet 0    buPROPion (WELLBUTRIN XL) 300 MG 24 hr tablet TAKE 1 TABLET DAILY 90 tablet 4    clonazePAM (KLONOPIN) 1 MG tablet Take 1 tablet (1 mg total) by mouth nightly as needed for Anxiety. Prn insomnia 60 tablet 1    escitalopram oxalate (LEXAPRO) 20 MG tablet Take 1 tablet (20 mg total) by mouth once daily. 90 tablet 1    lisinopril-hydrochlorothiazide (PRINZIDE,ZESTORETIC) 20-25 mg Tab Take 1 tablet by mouth once daily. 90 tablet 3    meloxicam (MOBIC) 7.5 MG tablet TAKE  1 TABLET DAILY 90 tablet 3    nitroGLYCERIN (NITROSTAT) 0.4 MG SL tablet Place 1 tablet (0.4 mg total) under the tongue every 5 (five) minutes as needed for Chest pain. Maximum 3 pills.  Don't take if using Viagra 25 tablet 5    omega 3-dha-epa-fish oil 300-1,000 mg Cap Take 1 g by mouth.      oxycodone-acetaminophen (PERCOCET)  mg per tablet Take 1 tablet by mouth 4 (four) times daily.  0    sildenafil (VIAGRA) 100 MG tablet Take 1 tablet (100 mg total) by mouth daily as needed for Erectile Dysfunction (Don't take if using any nitroglycerin). 18 tablet 3    levomefolate-algal oil (DEPLIN, ALGAL OIL,) 7.5-90.314 mg Cap Take 1 capsule by mouth once daily. (Patient not taking: Reported on 2020) 90 capsule 3     No current facility-administered medications on file prior to visit.      Social History     Socioeconomic History    Marital status:      Spouse name: Not on file    Number of children: Not on file    Years of education: Not on file    Highest education level: Not on file   Occupational History    Not on file   Social Needs    Financial resource strain: Not on file    Food insecurity:     Worry: Not on file     Inability: Not on file    Transportation needs:     Medical: Not on file     Non-medical: Not on file   Tobacco Use    Smoking status: Former Smoker     Packs/day: 1.00     Years: 30.00     Pack years: 30.00     Last attempt to quit: 10/17/2007     Years since quittin.2    Smokeless tobacco: Never Used   Substance and Sexual Activity    Alcohol use: Yes     Alcohol/week: 6.0 standard drinks     Types: 6 Cans of beer per week    Drug use: No    Sexual activity: Yes   Lifestyle    Physical activity:     Days per week: Not on file     Minutes per session: Not on file    Stress: Not on file   Relationships    Social connections:     Talks on phone: Not on file     Gets together: Not on file     Attends Hindu service: Not on file     Active member of club or  "organization: Not on file     Attends meetings of clubs or organizations: Not on file     Relationship status: Not on file   Other Topics Concern    Not on file   Social History Narrative    Not on file     Family History   Problem Relation Age of Onset    Lung cancer Father     Alzheimer's disease Mother     Heart attack Paternal Grandfather     Prostate cancer Brother            ROS:  GENERAL: No fever, chills,  or significant weight changes.   CARDIOVASCULAR: Denies chest pain, PND, orthopnea or reduced exercise tolerance.  ABDOMEN: Appetite fine. Denies diarrhea, abdominal pain, hematemesis or blood in stool.  URINARY: No flank pain, dysuria or hematuria.    Vitals:    01/16/20 0829   BP: 137/89   Pulse: 99   Temp: 98.2 °F (36.8 °C)   TempSrc: Oral   Weight: (!) 140.6 kg (309 lb 15.5 oz)   Height: 6' 3" (1.905 m)       Wt Readings from Last 3 Encounters:   01/16/20 (!) 140.6 kg (309 lb 15.5 oz)   12/16/19 (!) 138.8 kg (306 lb)   08/13/19 (!) 139.7 kg (307 lb 15.7 oz)       APPEARANCE: Well nourished, well developed, in no acute distress.    HEAD: Normocephalic.  Atraumatic.  EYES:   Right eye: Pupil reactive.  Conjunctiva clear.    Left eye: Pupil reactive.  Conjunctiva clear.    NECK: Supple. No bruits.  No JVD.  No cervical lymphadenopathy.  No thyromegaly.    CHEST: Breath sounds clear bilaterally.  Normal respiratory effort  CARDIOVASCULAR: Normal rate.  Regular rhythm.  No murmurs.  No rub.  No gallops.   No edema.  MENTAL STATUS: Alert.  Oriented x 3.  "

## 2020-01-17 ENCOUNTER — PATIENT MESSAGE (OUTPATIENT)
Dept: FAMILY MEDICINE | Facility: CLINIC | Age: 60
End: 2020-01-17

## 2020-01-17 ENCOUNTER — TELEPHONE (OUTPATIENT)
Dept: FAMILY MEDICINE | Facility: CLINIC | Age: 60
End: 2020-01-17

## 2020-01-17 NOTE — TELEPHONE ENCOUNTER
PA for levomefolate-algal oil (DEPLIN, ALGAL OIL,) 7.5-90.314 mg Cap received via fax, from pharmacy, what is the diagnosis for this, please advise.

## 2020-03-10 DIAGNOSIS — I25.10 CORONARY ARTERY DISEASE INVOLVING NATIVE CORONARY ARTERY OF NATIVE HEART WITHOUT ANGINA PECTORIS: ICD-10-CM

## 2020-03-10 DIAGNOSIS — E78.2 MIXED HYPERLIPIDEMIA: ICD-10-CM

## 2020-03-10 RX ORDER — ATORVASTATIN CALCIUM 40 MG/1
TABLET, FILM COATED ORAL
Qty: 90 TABLET | Refills: 3 | Status: SHIPPED | OUTPATIENT
Start: 2020-03-10 | End: 2021-02-23 | Stop reason: ALTCHOICE

## 2020-04-21 ENCOUNTER — PATIENT MESSAGE (OUTPATIENT)
Dept: FAMILY MEDICINE | Facility: CLINIC | Age: 60
End: 2020-04-21

## 2020-04-21 ENCOUNTER — TELEPHONE (OUTPATIENT)
Dept: FAMILY MEDICINE | Facility: CLINIC | Age: 60
End: 2020-04-21

## 2020-04-21 NOTE — TELEPHONE ENCOUNTER
----- Message from Nell Madera sent at 4/21/2020  8:23 AM CDT -----  Contact: wife  Type:  Same Day Appointment Request    Caller is requesting a same day appointment.  Caller declined first available appointment listed below.    Name of Caller: wife  When is the first available appointment? 04/24/2020 (VV)  Symptoms: left side pain  Best Call Back Number: [phone]  Additional Information: n/a

## 2020-04-22 ENCOUNTER — OFFICE VISIT (OUTPATIENT)
Dept: FAMILY MEDICINE | Facility: CLINIC | Age: 60
End: 2020-04-22
Payer: OTHER GOVERNMENT

## 2020-04-22 DIAGNOSIS — R10.12 LEFT UPPER QUADRANT PAIN: Primary | ICD-10-CM

## 2020-04-22 PROCEDURE — 99499 NO LOS: ICD-10-PCS | Mod: ,,, | Performed by: NURSE PRACTITIONER

## 2020-04-22 PROCEDURE — 99499 UNLISTED E&M SERVICE: CPT | Mod: ,,, | Performed by: NURSE PRACTITIONER

## 2020-04-22 NOTE — PROGRESS NOTES
Established Patient - Audio Only Telehealth Visit     The patient location is: ***  The chief complaint leading to consultation is: ***  Visit type: Virtual visit with audio only (telephone)     The reason for the audio only service rather than synchronous audio and video virtual visit was related to technical difficulties or patient preference/necessity.     Each patient to whom I provide medical services by telemedicine is:  (1) informed of the relationship between the physician and patient and the respective role of any other health care provider with respect to management of the patient; and (2) notified that they may decline to receive medical services by telemedicine and may withdraw from such care at any time. Patient verbally consented to receive this service via voice-only telephone call.       HPI: ***     Assessment and plan:  ***                        This service was not originating from a related E/M service provided within the previous 7 days nor will  to an E/M service or procedure within the next 24 hours or my soonest available appointment.  Prevailing standard of care was able to be met in this audio-only visit.

## 2020-04-22 NOTE — PROGRESS NOTES
"Subjective:       Patient ID: Gopi Garcia is a 59 y.o. male.    Chief Complaint: No chief complaint on file.  The patient location is: Pt's home  The chief complaint leading to consultation is: LUQ abdominal pain  Visit type: audio only  Total time spent with patient: 10 min  Each patient to whom he or she provides medical services by telemedicine is:  (1) informed of the relationship between the physician and patient and the respective role of any other health care provider with respect to management of the patient; and (2) notified that he or she may decline to receive medical services by telemedicine and may withdraw from such care at any time.  Abdominal Pain   This is a new problem. The current episode started in the past 7 days (x 4 d). The onset quality is undetermined. The problem occurs daily. The problem has been unchanged. The pain is located in the LUQ. The pain is mild. The quality of the pain is dull. Pertinent negatives include no anorexia, arthralgias, belching, constipation, diarrhea, dysuria, fever, flatus, frequency, headaches, hematochezia, hematuria, melena, myalgias, nausea, vomiting or weight loss. Nothing aggravates the pain. The pain is relieved by nothing. He has tried nothing for the symptoms. The treatment provided no relief. His past medical history is significant for abdominal surgery (Inguinal hernia repair). There is no history of colon cancer, Crohn's disease, gallstones, GERD, irritable bowel syndrome, pancreatitis, PUD or ulcerative colitis. Patient's medical history does not include kidney stones and UTI.   Pt states, "I have a doctoral paper that I am doing today and I have to get it finished today, which is why I was putting of the phone call and visit, but I need to come in and be checked out." Pt declines in office visit today; states will come in to clinic on tomorrow.  Past Medical History:   Diagnosis Date    Anxiety     AR (allergic rhinitis)     Chronic low " back pain     Colon polyp 2010    told to repeat 5 yrs    Coronary artery disease     Depression     ED (erectile dysfunction)     HTN (hypertension)     Hyperlipidemia     Myocardial infarct, old     Pulmonary nodule, left 2020    Repeat CT 2021    Sleep apnea      Social History     Socioeconomic History    Marital status:      Spouse name: Not on file    Number of children: Not on file    Years of education: Not on file    Highest education level: Not on file   Occupational History    Not on file   Social Needs    Financial resource strain: Not on file    Food insecurity:     Worry: Not on file     Inability: Not on file    Transportation needs:     Medical: Not on file     Non-medical: Not on file   Tobacco Use    Smoking status: Former Smoker     Packs/day: 1.00     Years: 30.00     Pack years: 30.00     Last attempt to quit: 10/17/2007     Years since quittin.5    Smokeless tobacco: Never Used   Substance and Sexual Activity    Alcohol use: Yes     Alcohol/week: 6.0 standard drinks     Types: 6 Cans of beer per week    Drug use: No    Sexual activity: Yes   Lifestyle    Physical activity:     Days per week: Not on file     Minutes per session: Not on file    Stress: Not on file   Relationships    Social connections:     Talks on phone: Not on file     Gets together: Not on file     Attends Confucianism service: Not on file     Active member of club or organization: Not on file     Attends meetings of clubs or organizations: Not on file     Relationship status: Not on file   Other Topics Concern    Not on file   Social History Narrative    Not on file     Past Surgical History:   Procedure Laterality Date    CARDIAC CATHETERIZATION  2006    INGUINAL HERNIA REPAIR Right 2011    VASECTOMY  2005       Review of Systems   Constitutional: Negative for fever and weight loss.   Gastrointestinal: Positive for abdominal pain. Negative for anorexia, constipation,  diarrhea, flatus, hematochezia, melena, nausea and vomiting.   Genitourinary: Negative for dysuria, frequency and hematuria.   Musculoskeletal: Negative for arthralgias and myalgias.   Neurological: Negative for headaches.       Objective:      Physical Exam    Assessment:       1. Left upper quadrant pain   Plan:           Diagnoses and all orders for this visit:    Left upper quadrant pain  Inappropriate for audio visit  Appt scheduled for tomorrow

## 2020-04-23 ENCOUNTER — HOSPITAL ENCOUNTER (OUTPATIENT)
Dept: RADIOLOGY | Facility: HOSPITAL | Age: 60
Discharge: HOME | End: 2020-04-23
Attending: NURSE PRACTITIONER
Payer: OTHER GOVERNMENT

## 2020-04-23 ENCOUNTER — LAB VISIT (OUTPATIENT)
Dept: LAB | Facility: HOSPITAL | Age: 60
End: 2020-04-23
Attending: NURSE PRACTITIONER
Payer: OTHER GOVERNMENT

## 2020-04-23 ENCOUNTER — OFFICE VISIT (OUTPATIENT)
Dept: FAMILY MEDICINE | Facility: CLINIC | Age: 60
End: 2020-04-23
Payer: OTHER GOVERNMENT

## 2020-04-23 VITALS
TEMPERATURE: 98 F | HEIGHT: 75 IN | WEIGHT: 309 LBS | HEART RATE: 105 BPM | SYSTOLIC BLOOD PRESSURE: 150 MMHG | BODY MASS INDEX: 38.42 KG/M2 | DIASTOLIC BLOOD PRESSURE: 90 MMHG

## 2020-04-23 DIAGNOSIS — R10.12 LUQ PAIN: Primary | ICD-10-CM

## 2020-04-23 DIAGNOSIS — R10.84 GENERALIZED ABDOMINAL PAIN: ICD-10-CM

## 2020-04-23 DIAGNOSIS — R10.12 LUQ PAIN: ICD-10-CM

## 2020-04-23 LAB
BASOPHILS # BLD AUTO: 0.07 K/UL (ref 0–0.2)
BASOPHILS NFR BLD: 0.8 % (ref 0–1.9)
DIFFERENTIAL METHOD: ABNORMAL
EOSINOPHIL # BLD AUTO: 0.3 K/UL (ref 0–0.5)
EOSINOPHIL NFR BLD: 2.9 % (ref 0–8)
ERYTHROCYTE [DISTWIDTH] IN BLOOD BY AUTOMATED COUNT: 11.8 % (ref 11.5–14.5)
HCT VFR BLD AUTO: 46.6 % (ref 40–54)
HGB BLD-MCNC: 16.2 G/DL (ref 14–18)
IMM GRANULOCYTES # BLD AUTO: 0.02 K/UL (ref 0–0.04)
IMM GRANULOCYTES NFR BLD AUTO: 0.2 % (ref 0–0.5)
LYMPHOCYTES # BLD AUTO: 2.3 K/UL (ref 1–4.8)
LYMPHOCYTES NFR BLD: 24.7 % (ref 18–48)
MCH RBC QN AUTO: 31.6 PG (ref 27–31)
MCHC RBC AUTO-ENTMCNC: 34.8 G/DL (ref 32–36)
MCV RBC AUTO: 91 FL (ref 82–98)
MONOCYTES # BLD AUTO: 0.6 K/UL (ref 0.3–1)
MONOCYTES NFR BLD: 6.9 % (ref 4–15)
NEUTROPHILS # BLD AUTO: 5.9 K/UL (ref 1.8–7.7)
NEUTROPHILS NFR BLD: 64.5 % (ref 38–73)
NRBC BLD-RTO: 0 /100 WBC
PLATELET # BLD AUTO: 287 K/UL (ref 150–350)
PMV BLD AUTO: 9.4 FL (ref 9.2–12.9)
RBC # BLD AUTO: 5.12 M/UL (ref 4.6–6.2)
WBC # BLD AUTO: 9.19 K/UL (ref 3.9–12.7)

## 2020-04-23 PROCEDURE — 74176 CT ABD & PELVIS W/O CONTRAST: CPT | Mod: TC,PO

## 2020-04-23 PROCEDURE — 85025 COMPLETE CBC W/AUTO DIFF WBC: CPT | Mod: PO

## 2020-04-23 PROCEDURE — 36415 COLL VENOUS BLD VENIPUNCTURE: CPT | Mod: PO

## 2020-04-23 PROCEDURE — 99999 PR PBB SHADOW E&M-EST. PATIENT-LVL IV: ICD-10-PCS | Mod: PBBFAC,,, | Performed by: NURSE PRACTITIONER

## 2020-04-23 PROCEDURE — 99213 OFFICE O/P EST LOW 20 MIN: CPT | Mod: S$PBB,,, | Performed by: NURSE PRACTITIONER

## 2020-04-23 PROCEDURE — 99213 PR OFFICE/OUTPT VISIT, EST, LEVL III, 20-29 MIN: ICD-10-PCS | Mod: S$PBB,,, | Performed by: NURSE PRACTITIONER

## 2020-04-23 PROCEDURE — 25500020 PHARM REV CODE 255: Mod: PO | Performed by: NURSE PRACTITIONER

## 2020-04-23 PROCEDURE — 99999 PR PBB SHADOW E&M-EST. PATIENT-LVL IV: CPT | Mod: PBBFAC,,, | Performed by: NURSE PRACTITIONER

## 2020-04-23 PROCEDURE — 74176 CT ABD & PELVIS W/O CONTRAST: CPT | Mod: 26,,, | Performed by: RADIOLOGY

## 2020-04-23 PROCEDURE — 74176 CT ABDOMEN PELVIS WITHOUT CONTRAST: ICD-10-PCS | Mod: 26,,, | Performed by: RADIOLOGY

## 2020-04-23 PROCEDURE — 99214 OFFICE O/P EST MOD 30 MIN: CPT | Mod: PBBFAC,PO,25 | Performed by: NURSE PRACTITIONER

## 2020-04-23 RX ORDER — DICYCLOMINE HYDROCHLORIDE 20 MG/1
20 TABLET ORAL 2 TIMES DAILY PRN
Qty: 14 TABLET | Refills: 0 | Status: SHIPPED | OUTPATIENT
Start: 2020-04-23 | End: 2020-04-30

## 2020-04-23 RX ORDER — AMOXICILLIN AND CLAVULANATE POTASSIUM 875; 125 MG/1; MG/1
1 TABLET, FILM COATED ORAL EVERY 12 HOURS
Qty: 20 TABLET | Refills: 0 | Status: SHIPPED | OUTPATIENT
Start: 2020-04-23 | End: 2020-05-03

## 2020-04-23 RX ADMIN — IOHEXOL 1000 ML: 12 SOLUTION ORAL at 02:04

## 2020-04-23 NOTE — PROGRESS NOTES
Subjective:       Patient ID: Gopi Garcia is a 59 y.o. male.    Chief Complaint: Flank Pain (right side)    Abdominal Pain   This is a new problem. The current episode started in the past 7 days. The onset quality is undetermined. The problem occurs daily. The problem has been gradually worsening. The pain is located in the LUQ. The pain is moderate. The quality of the pain is dull. The abdominal pain does not radiate. Pertinent negatives include no anorexia, arthralgias, belching, constipation, diarrhea, dysuria, fever, flatus, frequency, headaches, hematochezia, hematuria, melena, myalgias, nausea, vomiting or weight loss. The pain is aggravated by palpation. The pain is relieved by nothing. Treatments tried: Currently taking percocet. The treatment provided moderate relief. Prior diagnostic workup includes CT scan (Ordered today). His past medical history is significant for abdominal surgery (Inguinal hernia repair). There is no history of colon cancer, Crohn's disease, gallstones, GERD, irritable bowel syndrome, pancreatitis, PUD or ulcerative colitis. Patient's medical history does not include kidney stones and UTI.     Past Medical History:   Diagnosis Date    Anxiety     AR (allergic rhinitis)     Chronic low back pain     Colon polyp 2010    told to repeat 5 yrs    Coronary artery disease     Depression     ED (erectile dysfunction)     HTN (hypertension)     Hyperlipidemia     Myocardial infarct, old     Pulmonary nodule, left 1/2/2020    Repeat CT January 2021    Sleep apnea      Social History     Socioeconomic History    Marital status:      Spouse name: Not on file    Number of children: Not on file    Years of education: Not on file    Highest education level: Not on file   Occupational History    Not on file   Social Needs    Financial resource strain: Not on file    Food insecurity:     Worry: Not on file     Inability: Not on file    Transportation needs:      Medical: Not on file     Non-medical: Not on file   Tobacco Use    Smoking status: Former Smoker     Packs/day: 1.00     Years: 30.00     Pack years: 30.00     Last attempt to quit: 10/17/2007     Years since quittin.5    Smokeless tobacco: Never Used   Substance and Sexual Activity    Alcohol use: Yes     Alcohol/week: 6.0 standard drinks     Types: 6 Cans of beer per week    Drug use: No    Sexual activity: Yes   Lifestyle    Physical activity:     Days per week: Not on file     Minutes per session: Not on file    Stress: Not on file   Relationships    Social connections:     Talks on phone: Not on file     Gets together: Not on file     Attends Yazidism service: Not on file     Active member of club or organization: Not on file     Attends meetings of clubs or organizations: Not on file     Relationship status: Not on file   Other Topics Concern    Not on file   Social History Narrative    Not on file     Past Surgical History:   Procedure Laterality Date    CARDIAC CATHETERIZATION  2006    INGUINAL HERNIA REPAIR Right 2011    VASECTOMY  2005       Review of Systems   Constitutional: Negative.  Negative for fever and weight loss.   HENT: Negative.    Eyes: Negative.    Respiratory: Negative.    Cardiovascular: Negative.    Gastrointestinal: Positive for abdominal pain. Negative for anorexia, constipation, diarrhea, flatus, hematochezia, melena, nausea and vomiting.   Endocrine: Negative.    Genitourinary: Negative.  Negative for dysuria, frequency and hematuria.   Musculoskeletal: Negative.  Negative for arthralgias and myalgias.   Skin: Negative.    Allergic/Immunologic: Negative.    Neurological: Negative.  Negative for headaches.   Psychiatric/Behavioral: Negative.        Objective:      Physical Exam   Constitutional: He is oriented to person, place, and time. He appears well-developed and well-nourished.   HENT:   Head: Normocephalic.   Right Ear: External ear normal.   Left Ear: External  ear normal.   Nose: Nose normal.   Mouth/Throat: Oropharynx is clear and moist.   Eyes: Pupils are equal, round, and reactive to light. Conjunctivae are normal.   Neck: Normal range of motion. Neck supple.   Cardiovascular: Normal rate, regular rhythm and normal heart sounds.   Pulmonary/Chest: Effort normal and breath sounds normal.   Abdominal: Soft. Bowel sounds are normal. There is no hepatosplenomegaly, splenomegaly or hepatomegaly. There is tenderness in the left upper quadrant. There is guarding. There is no rigidity, no rebound, no CVA tenderness, no tenderness at McBurney's point and negative Pires's sign.   Musculoskeletal: Normal range of motion.   Neurological: He is alert and oriented to person, place, and time.   Skin: Skin is warm and dry. Capillary refill takes 2 to 3 seconds.   Psychiatric: He has a normal mood and affect. His behavior is normal. Judgment and thought content normal.   Nursing note and vitals reviewed.      Assessment:       1. LUQ pain    2. Generalized abdominal pain        Plan:           Gopi was seen today for flank pain.    Diagnoses and all orders for this visit:    LUQ pain  Generalized abdominal pain  -     CBC auto differential; Future  -     CT Abdomen Without Contrast; Future  -     amoxicillin-clavulanate 875-125mg (AUGMENTIN) 875-125 mg per tablet; Take 1 tablet by mouth every 12 (twelve) hours. for 10 days  -     dicyclomine (BENTYL) 20 mg tablet; Take 1 tablet (20 mg total) by mouth 2 (two) times daily as needed.  Report to ER immediately if symptoms worsen

## 2020-04-24 ENCOUNTER — TELEPHONE (OUTPATIENT)
Dept: FAMILY MEDICINE | Facility: CLINIC | Age: 60
End: 2020-04-24

## 2020-04-24 DIAGNOSIS — K52.9 COLITIS: Primary | ICD-10-CM

## 2020-04-28 ENCOUNTER — OFFICE VISIT (OUTPATIENT)
Dept: GASTROENTEROLOGY | Facility: CLINIC | Age: 60
End: 2020-04-28
Payer: OTHER GOVERNMENT

## 2020-04-28 VITALS
HEART RATE: 91 BPM | HEIGHT: 75 IN | BODY MASS INDEX: 38.42 KG/M2 | WEIGHT: 309 LBS | DIASTOLIC BLOOD PRESSURE: 91 MMHG | SYSTOLIC BLOOD PRESSURE: 141 MMHG

## 2020-04-28 DIAGNOSIS — R21 RASH AND NONSPECIFIC SKIN ERUPTION: ICD-10-CM

## 2020-04-28 DIAGNOSIS — R63.0 DECREASED APPETITE: ICD-10-CM

## 2020-04-28 DIAGNOSIS — R10.9 LEFT SIDED ABDOMINAL PAIN: Primary | ICD-10-CM

## 2020-04-28 DIAGNOSIS — R17 SERUM TOTAL BILIRUBIN ELEVATED: ICD-10-CM

## 2020-04-28 DIAGNOSIS — R93.3 ABNORMAL CT SCAN, GASTROINTESTINAL TRACT: ICD-10-CM

## 2020-04-28 DIAGNOSIS — Z79.01 ANTICOAGULANT LONG-TERM USE: ICD-10-CM

## 2020-04-28 DIAGNOSIS — K52.9 COLITIS: ICD-10-CM

## 2020-04-28 DIAGNOSIS — Z87.19 HISTORY OF DIVERTICULOSIS: ICD-10-CM

## 2020-04-28 PROCEDURE — 99999 PR PBB SHADOW E&M-EST. PATIENT-LVL V: CPT | Mod: PBBFAC,,, | Performed by: NURSE PRACTITIONER

## 2020-04-28 PROCEDURE — 99215 OFFICE O/P EST HI 40 MIN: CPT | Mod: PBBFAC,PO | Performed by: NURSE PRACTITIONER

## 2020-04-28 PROCEDURE — 99244 OFF/OP CNSLTJ NEW/EST MOD 40: CPT | Mod: S$PBB,,, | Performed by: NURSE PRACTITIONER

## 2020-04-28 PROCEDURE — 99244 PR OFFICE CONSULTATION,LEVEL IV: ICD-10-PCS | Mod: S$PBB,,, | Performed by: NURSE PRACTITIONER

## 2020-04-28 PROCEDURE — 99999 PR PBB SHADOW E&M-EST. PATIENT-LVL V: ICD-10-PCS | Mod: PBBFAC,,, | Performed by: NURSE PRACTITIONER

## 2020-04-28 RX ORDER — AMOXICILLIN 500 MG
CAPSULE ORAL DAILY
COMMUNITY

## 2020-04-28 NOTE — PATIENT INSTRUCTIONS
Abdominal Pain    Abdominal pain is pain in the stomach or belly area. Everyone has this pain from time to time. In many cases it goes away on its own. But abdominal pain can sometimes be due to a serious problem, such as appendicitis. So its important to know when to seek help.  Causes of abdominal pain  There are many possible causes of abdominal pain. Common causes in adults include:  · Constipation, diarrhea, or gas  · Stomach acid flowing back up into the esophagus (acid reflux or heartburn)  · Severe acid reflux, called GERD (gastroesophageal reflux disease)  · A sore in the lining of the stomach or small intestine (peptic ulcer)  · Inflammation of the gallbladder, liver, or pancreas  · Gallstones or kidney stones  · Appendicitis   · Intestinal blockage   · An internal organ pushing through a muscle or other tissue (hernia)  · Urinary tract infections  · In women, menstrual cramps, fibroids, or endometriosis  · Inflammation or infection of the intestines  Diagnosing the cause of abdominal pain  Your healthcare provider will do a physical exam help find the cause of your pain. If needed, tests will be ordered. Belly pain has many possible causes. So it can be hard to find the reason for your pain. Giving details about your pain can help. Tell your provider where and when you feel the pain, and what makes it better or worse. Also let your provider know if you have other symptoms such as:  · Fever  · Tiredness  · Upset stomach (nausea)  · Vomiting  · Changes in bathroom habits  Treating abdominal pain  Some causes of pain need emergency medical treatment right away. These include appendicitis or a bowel blockage. Other problems can be treated with rest, fluids, or medicines. Your healthcare provider can give you specific instructions for treatment or self-care based on what is causing your pain.  If you have vomiting or diarrhea, sip water or other clear fluids. When you are ready to eat solid foods again,  start with small amounts of easy-to-digest, low-fat foods. These include apple sauce, toast, or crackers.   When to seek medical care  Call 911 or go to the hospital right away if you:  · Cant pass stool and are vomiting  · Are vomiting blood or have bloody diarrhea or black, tarry diarrhea  · Have chest, neck, or shoulder pain  · Feel like you might pass out  · Have pain in your shoulder blades with nausea  · Have sudden, severe belly pain  · Have new, severe pain unlike any you have felt before  · Have a belly that is rigid, hard, and tender to touch  Call your healthcare provider if you have:  · Pain for more than 5 days  · Bloating for more than 2 days  · Diarrhea for more than 5 days  · A fever of 100.4°F (38.0°C) or higher, or as directed by your provider  · Pain that gets worse  · Weight loss for no reason  · Continued lack of appetite  · Blood in your stool  How to prevent abdominal pain  Here are some tips to help prevent abdominal pain:  · Eat smaller amounts of food at one time.  · Avoid greasy, fried, or other high-fat foods.  · Avoid foods that give you gas.  · Exercise regularly.  · Drink plenty of fluids.  To help prevent GERD symptoms:  · Quit smoking.  · Reduce alcohol and certain foods that increase stomach acid.  · Avoid aspirin and over-the-counter pain and fever medicines (NSAIDS or nonsteroidal anti-inflammatory drugs), if possible  · Lose extra weight.  · Finish eating at least 2 hours before you go to bed or lie down.  · Raise the head of your bed.  Date Last Reviewed: 7/1/2016  © 5511-2311 [employer]. [address] [place] [address], [place] [address]. All rights reserved. This information is not intended as a substitute for professional medical care. Always follow your healthcare professional's instructions.          Diverticulitis    Some people get pouches along the wall of the colon as they get older. The pouches, called diverticuli, usually cause no symptoms. If the pouches  become blocked, you can get an infection. This infection is called diverticulitis. It causes pain in your lower abdomen and fever. If not treated, it can become a serious condition, causing an abscess to form inside the pouch. The abscess may block the intestinal tract even or rupture, spreading infection throughout the abdomen.  When treatment is started early, oral antibiotics alone may be enough to cure diverticulitis. This method is tried first. But, if you don't improve or if your condition gets worse while using oral antibiotics, you may need to be admitted to the hospital for IV antibiotics. Severe cases may require surgery.  Home care  The following guidelines will help you care for yourself at home:  · During the acute illness, rest and follow your healthcare provider's instructions about diet. Sometimes you will need to follow a clear liquid diet to rest your bowel. Once your symptoms are better, you may be told to follow a low-fiber diet for some time. Include foods like:  ? Flake cereal, mashed potatoes, pancakes, waffles, pasta, white bread, rice, applesauce, bananas, eggs, fish, poultry, tofu, and cooked soft vegetables  · Take antibiotics exactly as instructed. Don't miss any doses or stop taking the medication, even if you feel better.  · Monitor your temperature and tell your healthcare provider if you have rising temperatures.  Preventing future attacks  Once you have an episode of diverticulitis, you are at risk for having it again. After you have recovered from this episode, you may be able to lower your risk by eating a high-fiber diet (20 gm/day to 35 gm/day of fiber). This cleans out the colon pouches that already exist and may prevent new ones from forming. Foods high in fiber include fresh fruits and edible peelings, raw or lightly cooked vegetables, whole grain cereals and breads, dried beans and peas, and bran.  Other steps that can help prevent future attacks include:  · Take your  medicines, such as antibiotics, as your healthcare provider says.  · Drink 6 to 8 glasses of water every day, unless told otherwise.  · Use a heating pad or hot water bottle to help abdominal cramping or pain.  · Begin an exercise program. Ask your healthcare provider how to get started. You can benefit from simple activities such as walking or gardening.  · Treat diarrhea with a bland diet. Start with liquids only; then slowly add fiber over time.  · Watch for changes in your bowel movements (constipation to diarrhea). Avoid constipation by eating a high fiber diet and taking a stool softener if needed.  · Get plenty of rest and sleep.  Follow-up care  Follow up with your healthcare provider as advised or sooner if you are not getting better in the next 2 days.  When to seek medical advice  Call your healthcare provider right away if any of these occur:  · Fever of 100.4°F (38°C) or higher, or as directed by your healthcare provider  · Repeated vomiting or swelling of the abdomen  · Weakness, dizziness, light-headedness  · Pain in your abdomen that gets worse, severe, or spreads to your back  · Pain that moves to the right lower abdomen  · Rectal bleeding (stools that are red, black or maroon color)  · Unexpected vaginal bleeding  Date Last Reviewed: 9/1/2016  © 2971-3199 [employer]. [address] [place] [address], [place] [address]. All rights reserved. This information is not intended as a substitute for professional medical care. Always follow your healthcare professional's instructions.

## 2020-04-28 NOTE — LETTER
April 28, 2020      [place]  [address]  [place] [address]           [facility]  [address] [place] [address]  Phone: [phone]          Patient: Gopi Garcia   MR Number: [id number]   YOB: 1960   Date of Visit: 4/28/2020       Dear Katerin Sol:    Thank you for referring Gopi Garcia to me for evaluation. Attached you will find relevant portions of my assessment and plan of care.    If you have questions, please do not hesitate to call me. I look forward to following Gopi Garcia along with you.    Sincerely,    Trudy Holguin, [facility]    Enclosure  CC:  No Recipients    If you would like to receive this communication electronically, please contact [email or web address] or [phone] to request more information on [employer] access.    For providers and/or their staff who would like to refer a patient to [facility], please contact us through our one-stop-shop provider referral line, [facility] , at [phone].    If you feel you have received this communication in error or would no longer like to receive these types of communications, please e-mail [email or web address]

## 2020-04-30 ENCOUNTER — TELEPHONE (OUTPATIENT)
Dept: FAMILY MEDICINE | Facility: CLINIC | Age: 60
End: 2020-04-30

## 2020-04-30 NOTE — TELEPHONE ENCOUNTER
----- Message from Margaret Reyes sent at 4/30/2020  7:35 AM CDT -----  Contact: wife  Calling to request blood work.  Pt found out his brother has Hemochromatist.  Calling to ask if pt can have lab drawn to find out if pt also has this condition since he has symptoms similar to his brother.

## 2020-05-03 ENCOUNTER — PATIENT MESSAGE (OUTPATIENT)
Dept: FAMILY MEDICINE | Facility: CLINIC | Age: 60
End: 2020-05-03

## 2020-05-03 DIAGNOSIS — Z83.49 FAMILY HISTORY OF HEMOCHROMATOSIS: Primary | ICD-10-CM

## 2020-05-05 ENCOUNTER — PATIENT MESSAGE (OUTPATIENT)
Dept: GASTROENTEROLOGY | Facility: CLINIC | Age: 60
End: 2020-05-05

## 2020-05-05 ENCOUNTER — OFFICE VISIT (OUTPATIENT)
Dept: GASTROENTEROLOGY | Facility: CLINIC | Age: 60
End: 2020-05-05
Payer: OTHER GOVERNMENT

## 2020-05-05 ENCOUNTER — PATIENT MESSAGE (OUTPATIENT)
Dept: FAMILY MEDICINE | Facility: CLINIC | Age: 60
End: 2020-05-05

## 2020-05-05 VITALS — WEIGHT: 297 LBS | HEIGHT: 75 IN | BODY MASS INDEX: 36.93 KG/M2

## 2020-05-05 DIAGNOSIS — K52.9 COLITIS: ICD-10-CM

## 2020-05-05 DIAGNOSIS — Z86.010 HISTORY OF COLON POLYPS: ICD-10-CM

## 2020-05-05 DIAGNOSIS — R17 SERUM TOTAL BILIRUBIN ELEVATED: ICD-10-CM

## 2020-05-05 DIAGNOSIS — R63.4 WEIGHT LOSS: ICD-10-CM

## 2020-05-05 DIAGNOSIS — R93.3 ABNORMAL CT SCAN, GASTROINTESTINAL TRACT: ICD-10-CM

## 2020-05-05 DIAGNOSIS — R10.9 LEFT SIDED ABDOMINAL PAIN: Primary | ICD-10-CM

## 2020-05-05 DIAGNOSIS — R63.0 DECREASED APPETITE: ICD-10-CM

## 2020-05-05 DIAGNOSIS — Z87.19 HISTORY OF DIVERTICULOSIS: ICD-10-CM

## 2020-05-05 DIAGNOSIS — G89.29 CHRONIC BILATERAL LOW BACK PAIN WITHOUT SCIATICA: Primary | ICD-10-CM

## 2020-05-05 DIAGNOSIS — Z79.01 ANTICOAGULANT LONG-TERM USE: ICD-10-CM

## 2020-05-05 DIAGNOSIS — M54.50 CHRONIC BILATERAL LOW BACK PAIN WITHOUT SCIATICA: Primary | ICD-10-CM

## 2020-05-05 PROCEDURE — 99214 OFFICE O/P EST MOD 30 MIN: CPT | Mod: 95,,, | Performed by: NURSE PRACTITIONER

## 2020-05-05 PROCEDURE — 99214 PR OFFICE/OUTPT VISIT, EST, LEVL IV, 30-39 MIN: ICD-10-PCS | Mod: 95,,, | Performed by: NURSE PRACTITIONER

## 2020-05-05 RX ORDER — CIPROFLOXACIN 500 MG/1
500 TABLET ORAL 2 TIMES DAILY
Qty: 14 TABLET | Refills: 0 | OUTPATIENT
Start: 2020-05-05 | End: 2020-05-12

## 2020-05-05 RX ORDER — METRONIDAZOLE 500 MG/1
500 TABLET ORAL 3 TIMES DAILY
Qty: 21 TABLET | Refills: 0 | Status: SHIPPED | OUTPATIENT
Start: 2020-05-05 | End: 2020-05-12

## 2020-05-05 RX ORDER — CIPROFLOXACIN 500 MG/1
500 TABLET ORAL 2 TIMES DAILY
Qty: 14 TABLET | Refills: 0 | Status: SHIPPED | OUTPATIENT
Start: 2020-05-05 | End: 2020-05-12

## 2020-05-05 NOTE — PATIENT INSTRUCTIONS
Abdominal Pain    Abdominal pain is pain in the stomach or belly area. Everyone has this pain from time to time. In many cases it goes away on its own. But abdominal pain can sometimes be due to a serious problem, such as appendicitis. So its important to know when to seek help.  Causes of abdominal pain  There are many possible causes of abdominal pain. Common causes in adults include:  · Constipation, diarrhea, or gas  · Stomach acid flowing back up into the esophagus (acid reflux or heartburn)  · Severe acid reflux, called GERD (gastroesophageal reflux disease)  · A sore in the lining of the stomach or small intestine (peptic ulcer)  · Inflammation of the gallbladder, liver, or pancreas  · Gallstones or kidney stones  · Appendicitis   · Intestinal blockage   · An internal organ pushing through a muscle or other tissue (hernia)  · Urinary tract infections  · In women, menstrual cramps, fibroids, or endometriosis  · Inflammation or infection of the intestines  Diagnosing the cause of abdominal pain  Your healthcare provider will do a physical exam help find the cause of your pain. If needed, tests will be ordered. Belly pain has many possible causes. So it can be hard to find the reason for your pain. Giving details about your pain can help. Tell your provider where and when you feel the pain, and what makes it better or worse. Also let your provider know if you have other symptoms such as:  · Fever  · Tiredness  · Upset stomach (nausea)  · Vomiting  · Changes in bathroom habits  Treating abdominal pain  Some causes of pain need emergency medical treatment right away. These include appendicitis or a bowel blockage. Other problems can be treated with rest, fluids, or medicines. Your healthcare provider can give you specific instructions for treatment or self-care based on what is causing your pain.  If you have vomiting or diarrhea, sip water or other clear fluids. When you are ready to eat solid foods again,  start with small amounts of easy-to-digest, low-fat foods. These include apple sauce, toast, or crackers.   When to seek medical care  Call 911 or go to the hospital right away if you:  · Cant pass stool and are vomiting  · Are vomiting blood or have bloody diarrhea or black, tarry diarrhea  · Have chest, neck, or shoulder pain  · Feel like you might pass out  · Have pain in your shoulder blades with nausea  · Have sudden, severe belly pain  · Have new, severe pain unlike any you have felt before  · Have a belly that is rigid, hard, and tender to touch  Call your healthcare provider if you have:  · Pain for more than 5 days  · Bloating for more than 2 days  · Diarrhea for more than 5 days  · A fever of 100.4°F (38.0°C) or higher, or as directed by your provider  · Pain that gets worse  · Weight loss for no reason  · Continued lack of appetite  · Blood in your stool  How to prevent abdominal pain  Here are some tips to help prevent abdominal pain:  · Eat smaller amounts of food at one time.  · Avoid greasy, fried, or other high-fat foods.  · Avoid foods that give you gas.  · Exercise regularly.  · Drink plenty of fluids.  To help prevent GERD symptoms:  · Quit smoking.  · Reduce alcohol and certain foods that increase stomach acid.  · Avoid aspirin and over-the-counter pain and fever medicines (NSAIDS or nonsteroidal anti-inflammatory drugs), if possible  · Lose extra weight.  · Finish eating at least 2 hours before you go to bed or lie down.  · Raise the head of your bed.  Date Last Reviewed: 7/1/2016  © 9356-8498 [employer]. [address] [place] [address], [place] [address]. All rights reserved. This information is not intended as a substitute for professional medical care. Always follow your healthcare professional's instructions.          Diverticulitis    Some people get pouches along the wall of the colon as they get older. The pouches, called diverticuli, usually cause no symptoms. If the pouches  become blocked, you can get an infection. This infection is called diverticulitis. It causes pain in your lower abdomen and fever. If not treated, it can become a serious condition, causing an abscess to form inside the pouch. The abscess may block the intestinal tract even or rupture, spreading infection throughout the abdomen.  When treatment is started early, oral antibiotics alone may be enough to cure diverticulitis. This method is tried first. But, if you don't improve or if your condition gets worse while using oral antibiotics, you may need to be admitted to the hospital for IV antibiotics. Severe cases may require surgery.  Home care  The following guidelines will help you care for yourself at home:  · During the acute illness, rest and follow your healthcare provider's instructions about diet. Sometimes you will need to follow a clear liquid diet to rest your bowel. Once your symptoms are better, you may be told to follow a low-fiber diet for some time. Include foods like:  ? Flake cereal, mashed potatoes, pancakes, waffles, pasta, white bread, rice, applesauce, bananas, eggs, fish, poultry, tofu, and cooked soft vegetables  · Take antibiotics exactly as instructed. Don't miss any doses or stop taking the medication, even if you feel better.  · Monitor your temperature and tell your healthcare provider if you have rising temperatures.  Preventing future attacks  Once you have an episode of diverticulitis, you are at risk for having it again. After you have recovered from this episode, you may be able to lower your risk by eating a high-fiber diet (20 gm/day to 35 gm/day of fiber). This cleans out the colon pouches that already exist and may prevent new ones from forming. Foods high in fiber include fresh fruits and edible peelings, raw or lightly cooked vegetables, whole grain cereals and breads, dried beans and peas, and bran.  Other steps that can help prevent future attacks include:  · Take your  medicines, such as antibiotics, as your healthcare provider says.  · Drink 6 to 8 glasses of water every day, unless told otherwise.  · Use a heating pad or hot water bottle to help abdominal cramping or pain.  · Begin an exercise program. Ask your healthcare provider how to get started. You can benefit from simple activities such as walking or gardening.  · Treat diarrhea with a bland diet. Start with liquids only; then slowly add fiber over time.  · Watch for changes in your bowel movements (constipation to diarrhea). Avoid constipation by eating a high fiber diet and taking a stool softener if needed.  · Get plenty of rest and sleep.  Follow-up care  Follow up with your healthcare provider as advised or sooner if you are not getting better in the next 2 days.  When to seek medical advice  Call your healthcare provider right away if any of these occur:  · Fever of 100.4°F (38°C) or higher, or as directed by your healthcare provider  · Repeated vomiting or swelling of the abdomen  · Weakness, dizziness, light-headedness  · Pain in your abdomen that gets worse, severe, or spreads to your back  · Pain that moves to the right lower abdomen  · Rectal bleeding (stools that are red, black or maroon color)  · Unexpected vaginal bleeding  Date Last Reviewed: 9/1/2016  © 7242-7907 [employer]. [address] [place] [address], [place] [address]. All rights reserved. This information is not intended as a substitute for professional medical care. Always follow your healthcare professional's instructions.

## 2020-05-05 NOTE — PROGRESS NOTES
"Subjective:       Patient ID: Gopi Garcia is a 59 y.o. male Body mass index is 37.12 kg/m².    Chief Complaint: Abdominal Pain (follow-up)    This patient is established.     The patient location is: home in [place]. I verified patient name and date of birth.  The chief complaint leading to consultation is: follow-up for abdominal pain  Visit type: Virtual visit with synchronous audio and video  Total time spent with patient: 13 minutes  Each patient to whom he or she provides medical services by telemedicine is:  (1) informed of the relationship between the physician and patient and the respective role of any other health care provider with respect to management of the patient; and (2) notified that he or she may decline to receive medical services by telemedicine and may withdraw from such care at any time.  Patient provided current height and weight measurements.    Abdominal Pain   Episode onset: started ~2-3 weeks ago. The problem occurs intermittently. The problem has been rapidly improving (mild improvement). The pain is located in the LLQ and LUQ. The pain is at a severity of 0/10 (currently). The quality of the pain is aching and dull (patient reports "when I think about it, I can feel it" otherwise he doens't feel it). The abdominal pain does not radiate. Associated symptoms include weight loss (lost ~8 lbs over the past week due to decreased appetite). Pertinent negatives include no belching, constipation, diarrhea, dysuria, fever, flatus, hematochezia, melena, nausea or vomiting. Relieved by: sitting or standing helps decrease the pain. He has tried antibiotics (AUGMENTIN 875-125 mg bid x 10 days- completed, bentyl 20 mg bid prn- completed prescription, percocet PRN taking 3 times a day, mobic daily) for the symptoms. The treatment provided significant relief. Prior diagnostic workup includes CT scan. There is no history of Crohn's disease, GERD, pancreatitis, PUD or ulcerative colitis. "     Review of Systems   Constitutional: Positive for appetite change (decreased) and weight loss (lost ~8 lbs over the past week due to decreased appetite). Negative for chills, fatigue and fever.   HENT: Negative for sore throat and trouble swallowing.    Respiratory: Negative for cough, choking and shortness of breath.    Cardiovascular: Negative for chest pain.   Gastrointestinal: Positive for abdominal pain. Negative for anal bleeding, blood in stool, constipation, diarrhea, flatus, hematochezia, melena, nausea, rectal pain and vomiting.   Genitourinary: Negative for difficulty urinating, dysuria and flank pain.   Skin: Negative for rash.   Neurological: Negative for weakness.       Past Medical History:   Diagnosis Date    Anxiety     AR (allergic rhinitis)     CAD (coronary artery disease)     Chronic low back pain     Colon polyp 2010    told to repeat 5 yrs    Coronary artery disease     Depression     Diverticulosis     ED (erectile dysfunction)     Fatty liver     Hepatomegaly     HTN (hypertension)     Hyperlipidemia     Myocardial infarct, old     Pulmonary nodule, left 1/2/2020    Repeat CT January 2021    Sleep apnea      Past Surgical History:   Procedure Laterality Date    CARDIAC CATHETERIZATION  2006    COLONOSCOPY  49 years old    colon polyps removed per patient report    INGUINAL HERNIA REPAIR Right 2011    VASECTOMY  2005     Family History   Problem Relation Age of Onset    Lung cancer Father     Alzheimer's disease Mother     Heart attack Paternal Grandfather     Prostate cancer Brother     Colon polyps Brother     Hemochromatosis Brother     Hemochromatosis Brother     Colon cancer Neg Hx     Crohn's disease Neg Hx     Ulcerative colitis Neg Hx     Stomach cancer Neg Hx     Esophageal cancer Neg Hx      Wt Readings from Last 10 Encounters:   05/05/20 134.7 kg (297 lb)   04/28/20 (!) 140.2 kg (309 lb)   04/23/20 (!) 140.2 kg (309 lb)   01/16/20 (!) 140.6 kg  (309 lb 15.5 oz)   12/16/19 (!) 138.8 kg (306 lb)   08/13/19 (!) 139.7 kg (307 lb 15.7 oz)   11/15/18 (!) 138.1 kg (304 lb 7.3 oz)   09/07/18 135.2 kg (298 lb)   08/31/18 136.1 kg (300 lb)   08/28/18 135.2 kg (298 lb)     Lab Results   Component Value Date    WBC 9.19 04/23/2020    HGB 16.2 04/23/2020    HCT 46.6 04/23/2020    MCV 91 04/23/2020     04/23/2020     CMP  Sodium   Date Value Ref Range Status   01/14/2020 141 136 - 145 mmol/L Final     Potassium   Date Value Ref Range Status   01/14/2020 4.0 3.5 - 5.1 mmol/L Final     Chloride   Date Value Ref Range Status   01/14/2020 101 95 - 110 mmol/L Final     CO2   Date Value Ref Range Status   01/14/2020 30 (H) 23 - 29 mmol/L Final     Glucose   Date Value Ref Range Status   01/14/2020 124 (H) 70 - 110 mg/dL Final     BUN, Bld   Date Value Ref Range Status   01/14/2020 12 6 - 20 mg/dL Final     Creatinine   Date Value Ref Range Status   01/14/2020 1.1 0.5 - 1.4 mg/dL Final     Calcium   Date Value Ref Range Status   01/14/2020 9.2 8.7 - 10.5 mg/dL Final     Total Protein   Date Value Ref Range Status   01/14/2020 7.0 6.0 - 8.4 g/dL Final     Albumin   Date Value Ref Range Status   01/14/2020 4.3 3.5 - 5.2 g/dL Final     Total Bilirubin   Date Value Ref Range Status   01/14/2020 1.1 (H) 0.1 - 1.0 mg/dL Final     Comment:     For infants and newborns, interpretation of results should be based  on gestational age, weight and in agreement with clinical  observations.  Premature Infant recommended reference ranges:  Up to 24 hours.............<8.0 mg/dL  Up to 48 hours............<12.0 mg/dL  3-5 days..................<15.0 mg/dL  6-29 days.................<15.0 mg/dL       Alkaline Phosphatase   Date Value Ref Range Status   01/14/2020 83 55 - 135 U/L Final     AST   Date Value Ref Range Status   01/14/2020 24 10 - 40 U/L Final     ALT   Date Value Ref Range Status   01/14/2020 18 10 - 44 U/L Final     Anion Gap   Date Value Ref Range Status   01/14/2020 10 8 -  16 mmol/L Final     eGFR if    Date Value Ref Range Status   01/14/2020 >60.0 >60 mL/min/1.73 m^2 Final     eGFR if non    Date Value Ref Range Status   01/14/2020 >60.0 >60 mL/min/1.73 m^2 Final     Comment:     Calculation used to obtain the estimated glomerular filtration  rate (eGFR) is the CKD-EPI equation.        Lab Results   Component Value Date    TSH 0.881 01/14/2020     Reviewed prior medical records including radiology report of 4/23/2020 CT abdomen pelvis.    Objective:      Physical Exam   Constitutional: He is oriented to person, place, and time. He appears well-developed and well-nourished. No distress.   Pulmonary/Chest: Effort normal. No respiratory distress.   Neurological: He is alert and oriented to person, place, and time.   Skin: No pallor.   Psychiatric: He has a normal mood and affect. His speech is normal and behavior is normal. Judgment and thought content normal.       Assessment:       1. Left sided abdominal pain    2. Abnormal CT scan, gastrointestinal tract    3. Colitis    4. History of diverticulosis    5. Serum total bilirubin elevated    6. History of colon polyps    7. Anticoagulant long-term use    8. Decreased appetite    9. Weight loss        Plan:       Left sided abdominal pain, Abnormal CT scan, gastrointestinal tract, History of diverticulosis, & Colitis  -   START  ciprofloxacin HCl (CIPRO) 500 MG tablet; Take 1 tablet (500 mg total) by mouth 2 (two) times daily. for 7 days  Dispense: 14 tablet; Refill: 0  -  START   metroNIDAZOLE (FLAGYL) 500 MG tablet; Take 1 tablet (500 mg total) by mouth 3 (three) times daily. DO NOT drink alcohol while taking & 72 hours after taking flagyl for 7 days  Dispense: 21 tablet; Refill: 0  - continue with Colonoscopy as scheduled for 6/2/2020, discussed procedure with the patient, verbalized understanding  - discussed the diagnosis of diverticulosis and diverticulitis, advised to continue a low fiber diet for  the next 2 weeks and then slowly increase fiber in diet. Advised a low fiber diet is recommended for diverticulitis (for about 4 weeks), but to prevent diverticulitis, high fiber diet is recommended.  -Recommended high fiber diet after episode has completely resolved. Recommended daily exercise, adequate water intake (six 8-oz glasses of water daily), and high fiber diet. OTC fiber supplements are recommended if diet does not reach daily fiber goal (25 grams daily), such as Metamucil, Citrucel, or FiberCon (take as directed, separate from other oral medications by >2 hours).  - instructed patient that if symptoms do not resolve or if worsen prior to colonoscopy to contact us or go to ER, patient verbalized understanding    History of colon polyps  - continue with Colonoscopy as scheduled for 6/2/2020, discussed procedure with the patient, verbalized understanding    Serum total bilirubin elevated  - minimize/avoid alcohol and tylenol products, & follow-up with PCP for continued evaluation and management; if specialist is needed, recommend seeing hepatology.    Decreased appetite & Weight loss  - discussed about possible medication for this; patient declined medication at this time; if it persist, we may need EGD to further evaluate symptom; patient wants to continue with colonoscopy as scheduled at this time  - encouraged PO intake and daily calorie counts to ensure adequate nutrition is taken in, recommend at least 2,000 calories a day  - recommend nutritional drinks, such as Boost, Ensure or Glucerna, to supplement nutrition needs  - avoid/minimize use of NSAIDs- since they can cause GI upset, bleeding and/or ulcers. If NSAID must be taken, recommend take with food.  - discussed with patient that a side effect of narcotic pain medications is nausea, advised patient to talk to provider who manages pain medication, patient verbalized understanding    Anticoagulant long-term use  - informed patient that the  anticoagulant(s) will likely need to be held for endoscopy, nurse will confirm with endoscopist, cardiologist, and/or PCP.    Follow up in about 2 weeks (around 5/19/2020), or if symptoms worsen or fail to improve.      If no improvement in symptoms or symptoms worsen, call/follow-up at clinic or go to ER.

## 2020-05-08 ENCOUNTER — TELEPHONE (OUTPATIENT)
Dept: FAMILY MEDICINE | Facility: CLINIC | Age: 60
End: 2020-05-08

## 2020-05-08 DIAGNOSIS — Z83.49 FAMILY HISTORY OF HEMOCHROMATOSIS: Primary | ICD-10-CM

## 2020-05-08 NOTE — TELEPHONE ENCOUNTER
Hemochromatosis dna can not be drawn on a Friday due to specimen viability needs new orders to schedule for Monday

## 2020-05-11 ENCOUNTER — LAB VISIT (OUTPATIENT)
Dept: LAB | Facility: HOSPITAL | Age: 60
End: 2020-05-11
Attending: FAMILY MEDICINE
Payer: OTHER GOVERNMENT

## 2020-05-11 DIAGNOSIS — Z83.49 FAMILY HISTORY OF HEMOCHROMATOSIS: ICD-10-CM

## 2020-05-11 PROCEDURE — 36415 COLL VENOUS BLD VENIPUNCTURE: CPT | Mod: PO

## 2020-05-11 PROCEDURE — 81256 HFE GENE: CPT

## 2020-05-15 LAB
GENETICIST REVIEW: NORMAL
HFE GENE MUT ANL BLD/T: NORMAL
HFE RELEASED BY: NORMAL
HFE RESULT SUMMARY: NORMAL
REF LAB TEST METHOD: NORMAL
SPECIMEN SOURCE: NORMAL
SPECIMEN,  HEMOCHROMATOSIS: NORMAL

## 2020-05-18 ENCOUNTER — PATIENT MESSAGE (OUTPATIENT)
Dept: GASTROENTEROLOGY | Facility: CLINIC | Age: 60
End: 2020-05-18

## 2020-05-18 ENCOUNTER — TELEPHONE (OUTPATIENT)
Dept: FAMILY MEDICINE | Facility: CLINIC | Age: 60
End: 2020-05-18

## 2020-05-18 ENCOUNTER — PATIENT MESSAGE (OUTPATIENT)
Dept: FAMILY MEDICINE | Facility: CLINIC | Age: 60
End: 2020-05-18

## 2020-05-18 NOTE — TELEPHONE ENCOUNTER
----- Message from Shari Doyle sent at 5/18/2020 12:23 PM CDT -----  Contact: [facility]/Dr Lindsay  Please call nurse @ [phone] regarding prior authorization or  for treatment.

## 2020-05-18 NOTE — TELEPHONE ENCOUNTER
Left message on voice mail that referral sent to preservice to work, and Information sent via [employer] to patient to check back later in the week to see if authorized

## 2020-05-22 DIAGNOSIS — Z01.812 PRE-PROCEDURE LAB EXAM: Primary | ICD-10-CM

## 2020-05-28 ENCOUNTER — PATIENT MESSAGE (OUTPATIENT)
Dept: GASTROENTEROLOGY | Facility: CLINIC | Age: 60
End: 2020-05-28

## 2020-05-28 ENCOUNTER — TELEPHONE (OUTPATIENT)
Dept: GASTROENTEROLOGY | Facility: CLINIC | Age: 60
End: 2020-05-28

## 2020-05-31 ENCOUNTER — LAB VISIT (OUTPATIENT)
Dept: FAMILY MEDICINE | Facility: CLINIC | Age: 60
End: 2020-05-31
Payer: OTHER GOVERNMENT

## 2020-05-31 DIAGNOSIS — Z01.812 PRE-PROCEDURE LAB EXAM: ICD-10-CM

## 2020-05-31 PROCEDURE — U0003 INFECTIOUS AGENT DETECTION BY NUCLEIC ACID (DNA OR RNA); SEVERE ACUTE RESPIRATORY SYNDROME CORONAVIRUS 2 (SARS-COV-2) (CORONAVIRUS DISEASE [COVID-19]), AMPLIFIED PROBE TECHNIQUE, MAKING USE OF HIGH THROUGHPUT TECHNOLOGIES AS DESCRIBED BY CMS-2020-01-R: HCPCS

## 2020-06-01 LAB — SARS-COV-2 RNA RESP QL NAA+PROBE: NOT DETECTED

## 2020-06-01 NOTE — H&P
"History & Physical - Short Stay  Gastroenterology      SUBJECTIVE:     Procedure: Colonoscopy    Chief Complaint/Indication for Procedure:  Left sided abdominal pain.  Hx of colon polyps.     History of Present Illness:  Office Visit  5/5/2020  [place] [facility]      BÁRBARA Guerrero   Gastroenterology   Left sided abdominal pain +8 more   Dx   Abdominal Pain     Reason for Visit        Progress Notes        Subjective:       Patient ID: Gopi Garcia is a 59 y.o. male Body mass index is 37.12 kg/m².     Chief Complaint: Abdominal Pain (follow-up)     This patient is established.     The patient location is: home in [place]. I verified patient name and date of birth.  The chief complaint leading to consultation is: follow-up for abdominal pain  Visit type: Virtual visit with synchronous audio and video  Total time spent with patient: 13 minutes  Each patient to whom he or she provides medical services by telemedicine is:  (1) informed of the relationship between the physician and patient and the respective role of any other health care provider with respect to management of the patient; and (2) notified that he or she may decline to receive medical services by telemedicine and may withdraw from such care at any time.  Patient provided current height and weight measurements.     Abdominal Pain   Episode onset: started ~2-3 weeks ago. The problem occurs intermittently. The problem has been rapidly improving (mild improvement). The pain is located in the LLQ and LUQ. The pain is at a severity of 0/10 (currently). The quality of the pain is aching and dull (patient reports "when I think about it, I can feel it" otherwise he doens't feel it). The abdominal pain does not radiate. Associated symptoms include weight loss (lost ~8 lbs over the past week due to decreased appetite). Pertinent negatives include no belching, constipation, diarrhea, dysuria, fever, flatus, hematochezia, melena, nausea " or vomiting. Relieved by: sitting or standing helps decrease the pain. He has tried antibiotics (AUGMENTIN 875-125 mg bid x 10 days- completed, bentyl 20 mg bid prn- completed prescription, percocet PRN taking 3 times a day, mobic daily) for the symptoms. The treatment provided significant relief. Prior diagnostic workup includes CT scan. There is no history of Crohn's disease, GERD, pancreatitis, PUD or ulcerative colitis.      Review of Systems   Constitutional: Positive for appetite change (decreased) and weight loss (lost ~8 lbs over the past week due to decreased appetite). Negative for chills, fatigue and fever.   HENT: Negative for sore throat and trouble swallowing.    Respiratory: Negative for cough, choking and shortness of breath.    Cardiovascular: Negative for chest pain.   Gastrointestinal: Positive for abdominal pain. Negative for anal bleeding, blood in stool, constipation, diarrhea, flatus, hematochezia, melena, nausea, rectal pain and vomiting.     COLONOSCOPY   49 years old      colon polyps removed per patient report       Assessment:       1. Left sided abdominal pain    2. Abnormal CT scan, gastrointestinal tract    3. Colitis    4. History of diverticulosis    5. Serum total bilirubin elevated    6. History of colon polyps    7. Anticoagulant long-term use    8. Decreased appetite    9. Weight loss        Plan:       Left sided abdominal pain, Abnormal CT scan, gastrointestinal tract, History of diverticulosis, & Colitis  -   START  ciprofloxacin HCl (CIPRO) 500 MG tablet; Take 1 tablet (500 mg total) by mouth 2 (two) times daily. for 7 days  Dispense: 14 tablet; Refill: 0  -  START   metroNIDAZOLE (FLAGYL) 500 MG tablet; Take 1 tablet (500 mg total) by mouth 3 (three) times daily. DO NOT drink alcohol while taking & 72 hours after taking flagyl for 7 days  Dispense: 21 tablet; Refill: 0  - continue with Colonoscopy as scheduled for 6/2/2020, discussed procedure with the patient, verbalized  understanding  - discussed the diagnosis of diverticulosis and diverticulitis, advised to continue a low fiber diet for the next 2 weeks and then slowly increase fiber in diet. Advised a low fiber diet is recommended for diverticulitis (for about 4 weeks), but to prevent diverticulitis, high fiber diet is recommended.  -Recommended high fiber diet after episode has completely resolved. Recommended daily exercise, adequate water intake (six 8-oz glasses of water daily), and high fiber diet. OTC fiber supplements are recommended if diet does not reach daily fiber goal (25 grams daily), such as Metamucil, Citrucel, or FiberCon (take as directed, separate from other oral medications by >2 hours).  - instructed patient that if symptoms do not resolve or if worsen prior to colonoscopy to contact us or go to ER, patient verbalized understanding     History of colon polyps  - continue with Colonoscopy as scheduled for 6/2/2020, discussed procedure with the patient, verbalized understanding     Serum total bilirubin elevated  - minimize/avoid alcohol and tylenol products, & follow-up with PCP for continued evaluation and management; if specialist is needed, recommend seeing hepatology.     Decreased appetite & Weight loss  - discussed about possible medication for this; patient declined medication at this time; if it persist, we may need EGD to further evaluate symptom; patient wants to continue with colonoscopy as scheduled at this time  - encouraged PO intake and daily calorie counts to ensure adequate nutrition is taken in, recommend at least 2,000 calories a day  - recommend nutritional drinks, such as Boost, Ensure or Glucerna, to supplement nutrition needs  - avoid/minimize use of NSAIDs- since they can cause GI upset, bleeding and/or ulcers. If NSAID must be taken, recommend take with food.  - discussed with patient that a side effect of narcotic pain medications is nausea, advised patient to talk to provider who  manages pain medication, patient verbalized understanding     Anticoagulant long-term use  - informed patient that the anticoagulant(s) will likely need to be held for endoscopy, nurse will confirm with endoscopist, cardiologist, and/or PCP.     Follow up in about 2 weeks (around 5/19/2020), or if symptoms worsen or fail to improve.              Office Visit   4/28/2020  [place] - Gastro[facility]ology      BÁRBARA Guerrero   Gastroenterology   Left sided abdominal pain +7 more   Dx   Abdominal Pain ; Referred by Katerin Sol NP   Reason for Visit       Progress Notes        Subjective:       Patient ID: Gopi Garcia is a 59 y.o. male Body mass index is 38.62 kg/m².     Chief Complaint: Abdominal Pain     This patient is new to me.  Referring Provider: Katerin Sol for COLITIS.     Abdominal Pain   This is a new problem. Episode onset: started ~8-10 days. The problem occurs constantly. The problem has been gradually improving (mild improvement). The pain is located in the LLQ and LUQ. The pain is at a severity of 4/10 (currently). The quality of the pain is aching and sharp (sharp with pressure to site). The abdominal pain does not radiate. Pertinent negatives include no belching, constipation, diarrhea, dysuria, fever, flatus, hematochezia, melena, nausea, vomiting or weight loss. Exacerbated by: stretching, bending forward. Relieved by: sitting or standing helps decrease the pain. He has tried antibiotics (AUGMENTIN 875-125 mg bid x 10 days, bentyl 20 mg bid prn, percocet PRN taking 2 times a day, mobic daily) for the symptoms. The treatment provided mild relief. Prior diagnostic workup includes CT scan. There is no history of Crohn's disease, GERD, pancreatitis, PUD or ulcerative colitis.                See CT Scan 4/23/2020:  Impression       1. Colonic diverticulosis with focal pericolonic inflammation and edema involving a short segment of the descending colon, concerning  for acute diverticulitis versus colitis.  No evidence of abscess formation or pneumoperitoneum.  2. Hepatomegaly and hepatic steatosis.  This report was flagged in Epic as abnormal.    Electronically signed by: Cliff Brandon  Date: 04/23/2020           PTA Medications   Medication Sig    aspirin (ECOTRIN) 81 MG EC tablet Take 81 mg by mouth once daily.    atorvastatin (LIPITOR) 40 MG tablet TAKE 1 TABLET EVERY EVENING    buPROPion (WELLBUTRIN XL) 300 MG 24 hr tablet TAKE 1 TABLET DAILY    clonazePAM (KLONOPIN) 1 MG tablet Take 1 tablet (1 mg total) by mouth nightly as needed for Anxiety. Prn insomnia    escitalopram oxalate (LEXAPRO) 20 MG tablet Take 1 tablet (20 mg total) by mouth once daily.    lisinopril-hydrochlorothiazide (PRINZIDE,ZESTORETIC) 20-25 mg Tab Take 1 tablet by mouth once daily.    meloxicam (MOBIC) 7.5 MG tablet TAKE 1 TABLET DAILY    omega-3 fatty acids/fish oil (FISH OIL-OMEGA-3 FATTY ACIDS) 300-1,000 mg capsule Take by mouth once daily.    oxycodone-acetaminophen (PERCOCET)  mg per tablet Take 1 tablet by mouth 4 (four) times daily.    sildenafil (VIAGRA) 100 MG tablet Take 1 tablet (100 mg total) by mouth daily as needed for Erectile Dysfunction (Don't take if using any nitroglycerin).    levomefolate-algal oil (DEPLIN, ALGAL OIL,) 7.5-90.314 mg Cap Take 1 capsule by mouth once daily.    nitroGLYCERIN (NITROSTAT) 0.4 MG SL tablet Place 1 tablet (0.4 mg total) under the tongue every 5 (five) minutes as needed for Chest pain. Maximum 3 pills.  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    Chronic low back pain     Colon polyp 2010    told to repeat 5 yrs    Coronary artery disease     no stents     "Depression     Diverticulosis     ED (erectile dysfunction)     Fatty liver     Hepatomegaly     HTN (hypertension)     Hyperlipidemia     Myocardial infarct, old     Pulmonary nodule, left 2020    Repeat CT 2021    Sleep apnea      Past Surgical History:   Procedure Laterality Date    CARDIAC CATHETERIZATION  2006    COLONOSCOPY  49 years old    colon polyps removed per patient report    INGUINAL HERNIA REPAIR Right 2011    VASECTOMY  2005     Family History   Problem Relation Age of Onset    Lung cancer Father     Alzheimer's disease Mother     Heart attack Paternal Grandfather     Prostate cancer Brother     Colon polyps Brother     Hemochromatosis Brother     Hemochromatosis Brother     Colon cancer Neg Hx     Crohn's disease Neg Hx     Ulcerative colitis Neg Hx     Stomach cancer Neg Hx     Esophageal cancer Neg Hx      Social History     Tobacco Use    Smoking status: Former Smoker     Packs/day: 1.00     Years: 30.00     Pack years: 30.00     Last attempt to quit: 10/17/2007     Years since quittin.6    Smokeless tobacco: Never Used   Substance Use Topics    Alcohol use: Yes     Alcohol/week: 0.0 - 1.0 standard drinks    Drug use: No         OBJECTIVE:     Vital Signs (Most Recent)  Temp: 97.5 °F (36.4 °C) (20 1005)  Pulse: 75 (20 1005)  Resp: 15 (20 1005)  BP: (!) 171/96 (20 1005)  SpO2: 97 % (20 1005)    Physical Exam:  :Ht 6' 3" (1.905 m)   Wt 134.7 kg (297 lb)   BMI 37.12 kg/m²                                                           GENERAL:  Comfortable, in no acute distress.                                 HEENT EXAM:  Nonicteric.  No adenopathy.  Oropharynx is clear.               NECK:  Supple.                                                               LUNGS:  Clear.                                                               CARDIAC:  Regular rate and rhythm.  S1, S2.  No murmur.                      ABDOMEN:  Obese.  " Soft, positive bowel sounds, nontender.  No hepatosplenomegaly or masses.  No rebound or guarding.                                             EXTREMITIES:  No edema.     MENTAL STATUS:  Alert and oriented.    ASSESSMENT/PLAN:     Assessment: Left sided abdominal pain.  Hx of colon polyps.     Plan: Colonoscopy    Anesthesia Plan:   MAC / General Anaesthesia    ASA Grade: ASA 2 - Patient with mild systemic disease with no functional limitations    MALLAMPATI SCORE: II (hard and soft palate, upper portion of tonsils anduvula visible)

## 2020-06-02 ENCOUNTER — HOSPITAL ENCOUNTER (OUTPATIENT)
Facility: HOSPITAL | Age: 60
Discharge: HOME | End: 2020-06-02
Attending: INTERNAL MEDICINE | Admitting: INTERNAL MEDICINE
Payer: OTHER GOVERNMENT

## 2020-06-02 ENCOUNTER — ANESTHESIA (OUTPATIENT)
Dept: ENDOSCOPY | Facility: HOSPITAL | Age: 60
End: 2020-06-02
Payer: OTHER GOVERNMENT

## 2020-06-02 ENCOUNTER — ANESTHESIA EVENT (OUTPATIENT)
Dept: ENDOSCOPY | Facility: HOSPITAL | Age: 60
End: 2020-06-02
Payer: OTHER GOVERNMENT

## 2020-06-02 VITALS
OXYGEN SATURATION: 95 % | DIASTOLIC BLOOD PRESSURE: 88 MMHG | WEIGHT: 300 LBS | BODY MASS INDEX: 37.3 KG/M2 | HEIGHT: 75 IN | TEMPERATURE: 98 F | RESPIRATION RATE: 14 BRPM | SYSTOLIC BLOOD PRESSURE: 151 MMHG | HEART RATE: 74 BPM

## 2020-06-02 DIAGNOSIS — R10.32 LLQ PAIN: ICD-10-CM

## 2020-06-02 PROCEDURE — G0121 COLON CA SCRN NOT HI RSK IND: ICD-10-PCS | Mod: ,,, | Performed by: INTERNAL MEDICINE

## 2020-06-02 PROCEDURE — G0105 COLORECTAL SCRN; HI RISK IND: HCPCS | Mod: PO | Performed by: INTERNAL MEDICINE

## 2020-06-02 PROCEDURE — 63600175 PHARM REV CODE 636 W HCPCS: Mod: PO | Performed by: INTERNAL MEDICINE

## 2020-06-02 PROCEDURE — 00811 ANES LWR INTST NDSC NOS: CPT | Mod: PO | Performed by: INTERNAL MEDICINE

## 2020-06-02 PROCEDURE — 37000009 HC ANESTHESIA EA ADD 15 MINS: Mod: PO | Performed by: INTERNAL MEDICINE

## 2020-06-02 PROCEDURE — D9220A PRA ANESTHESIA: ICD-10-PCS | Mod: ANES,,, | Performed by: ANESTHESIOLOGY

## 2020-06-02 PROCEDURE — G0121 COLON CA SCRN NOT HI RSK IND: HCPCS | Mod: ,,, | Performed by: INTERNAL MEDICINE

## 2020-06-02 PROCEDURE — 63600175 PHARM REV CODE 636 W HCPCS: Mod: PO | Performed by: NURSE ANESTHETIST, CERTIFIED REGISTERED

## 2020-06-02 PROCEDURE — 37000008 HC ANESTHESIA 1ST 15 MINUTES: Mod: PO | Performed by: INTERNAL MEDICINE

## 2020-06-02 PROCEDURE — D9220A PRA ANESTHESIA: ICD-10-PCS | Mod: CRNA,,, | Performed by: NURSE ANESTHETIST, CERTIFIED REGISTERED

## 2020-06-02 PROCEDURE — 45378 DIAGNOSTIC COLONOSCOPY: CPT | Mod: PO | Performed by: INTERNAL MEDICINE

## 2020-06-02 PROCEDURE — D9220A PRA ANESTHESIA: Mod: ANES,,, | Performed by: ANESTHESIOLOGY

## 2020-06-02 PROCEDURE — 25000003 PHARM REV CODE 250: Mod: PO | Performed by: NURSE ANESTHETIST, CERTIFIED REGISTERED

## 2020-06-02 PROCEDURE — D9220A PRA ANESTHESIA: Mod: CRNA,,, | Performed by: NURSE ANESTHETIST, CERTIFIED REGISTERED

## 2020-06-02 RX ORDER — LIDOCAINE HCL/PF 100 MG/5ML
SYRINGE (ML) INTRAVENOUS
Status: DISCONTINUED | OUTPATIENT
Start: 2020-06-02 | End: 2020-06-02

## 2020-06-02 RX ORDER — LABETALOL HYDROCHLORIDE 5 MG/ML
INJECTION, SOLUTION INTRAVENOUS
Status: DISCONTINUED | OUTPATIENT
Start: 2020-06-02 | End: 2020-06-02

## 2020-06-02 RX ORDER — PROPOFOL 10 MG/ML
VIAL (ML) INTRAVENOUS CONTINUOUS PRN
Status: DISCONTINUED | OUTPATIENT
Start: 2020-06-02 | End: 2020-06-02

## 2020-06-02 RX ORDER — SODIUM CHLORIDE, SODIUM LACTATE, POTASSIUM CHLORIDE, CALCIUM CHLORIDE 600; 310; 30; 20 MG/100ML; MG/100ML; MG/100ML; MG/100ML
INJECTION, SOLUTION INTRAVENOUS CONTINUOUS
Status: DISCONTINUED | OUTPATIENT
Start: 2020-06-02 | End: 2020-06-02 | Stop reason: HOSPADM

## 2020-06-02 RX ORDER — SODIUM CHLORIDE 0.9 % (FLUSH) 0.9 %
10 SYRINGE (ML) INJECTION
Status: DISCONTINUED | OUTPATIENT
Start: 2020-06-02 | End: 2020-06-02 | Stop reason: HOSPADM

## 2020-06-02 RX ORDER — PROPOFOL 10 MG/ML
VIAL (ML) INTRAVENOUS
Status: DISCONTINUED | OUTPATIENT
Start: 2020-06-02 | End: 2020-06-02

## 2020-06-02 RX ADMIN — LIDOCAINE HYDROCHLORIDE 100 MG: 20 INJECTION, SOLUTION INTRAVENOUS at 10:06

## 2020-06-02 RX ADMIN — SODIUM CHLORIDE, SODIUM LACTATE, POTASSIUM CHLORIDE, AND CALCIUM CHLORIDE: .6; .31; .03; .02 INJECTION, SOLUTION INTRAVENOUS at 10:06

## 2020-06-02 RX ADMIN — LABETALOL HYDROCHLORIDE 10 MG: 5 INJECTION, SOLUTION INTRAVENOUS at 11:06

## 2020-06-02 RX ADMIN — PROPOFOL 150 MCG/KG/MIN: 10 INJECTION, EMULSION INTRAVENOUS at 10:06

## 2020-06-02 RX ADMIN — PROPOFOL 100 MG: 10 INJECTION, EMULSION INTRAVENOUS at 10:06

## 2020-06-02 RX ADMIN — PROPOFOL 50 MG: 10 INJECTION, EMULSION INTRAVENOUS at 10:06

## 2020-06-02 NOTE — [employer] PATIENT INSTRUCTIONS
Discharge Summary/Instructions for after Colonoscopy without   Biopsy/Polypectomy  Gopi Garcia    Tuesday, June 2, 2020  Zachariah Dowell MD  RESTRICTIONS ON ACTIVITY:  - Do not drive a car or operate machinery until the day after the procedure.      - The following day: return to full activity including work.  - For  3 days: No heavy lifting, straining or running.  - Diet: You may eat solid foods, but no gassy foods (i.e. beans, broccoli,   cabbage, etc).  TREATMENT FOR COMMON SIDE EFFECTS:  - Mild abdominal pain and bloating or excessive gas: rest, eat lightly and   use a heating pad.  SYMPTOMS TO WATCH FOR AND REPORT TO YOUR PHYSICIAN:  1. Severe abdominal pain.  2. Fever within 24 hours after a procedure.  3. A large amount of rectal bleeding. (A small amount of blood from the   rectum is not serious, especially if hemorrhoids are present.  3.  Because air was put into your colon during the procedure, expelling   large amounts of air from your rectum is normal.  4.  You may not have a bowel movement for 1-3 days because of the   colonoscopy prep.  This is normal.  5.  Call immediately if you notice any of the following:   Chills and/or fever over 101   Persistent vomiting   Severe abdominal pain, other than gas cramps   Severe chest pain   Black, tarry stools   Any bleeding - exceeding one tablespoon  Your doctor recommends these additional instructions:  Eat a high fiber diet.   Take a fiber supplement, for example Citrucel, Fibercon, Konsyl or   Metamucil.   Your physician has recommended a repeat colonoscopy in 10 years for   screening purposes.  None  If you have any questions or problems, please call your physician.  EMERGENCY PHONE NUMBER: [phone]  LAB RESULTS: Call in two (2) weeks for lab results, [phone]  ___________________________________________  Nurse Signature  ___________________________________________  Patient/Designated Responsible Party Signature  Zachariah Dowell  MD  6/2/2020 11:24:38 AM  This report has been verified and signed electronically.  [employer]

## 2020-06-02 NOTE — BRIEF OP NOTE
Discharge Note  Short Stay      SUMMARY     Admit Date: 6/2/2020    Attending Physician: Zachariah Dowell Jr., MD     Discharge Physician: Zachariah Dowell Jr., MD    Discharge Date: 6/2/2020 11:26 AM    Final Diagnosis: Abdominal pain, unspecified abdominal location [R10.9]  Abnormal CT scan [R93.89]  Hx of colonic polyps [Z86.010]    Impression:          - Diverticulosis in the sigmoid colon.                       - Mild colonic spasm consistent with irritable bowel                        syndrome.                       - Non-bleeding internal hemorrhoids.                       - The examination was otherwise normal.                       - The examined portion of the ileum was normal.                       - No specimens collected.  Recommendation:      - Discharge patient to home.                       - High fiber diet.                       - Use fiber, for example Citrucel, Fibercon, Konsyl                        or Metamucil.                       - Repeat colonoscopy in 10 years for screening                        purposes.                       - Continue present medications.                       - Patient has a contact number available for                        emergencies. The signs and symptoms of potential                        delayed complications were discussed with the                        patient. Return to normal activities tomorrow.                        Written discharge instructions were provided to the                        patient.                       - Return to normal activities tomorrow.  Zachariah Dowell MD  6/2/2020    Disposition: HOME OR SELF CARE    Patient Instructions:   Current Discharge Medication List      CONTINUE these medications which have NOT CHANGED    Details   aspirin (ECOTRIN) 81 MG EC tablet Take 81 mg by mouth once daily.      atorvastatin (LIPITOR) 40 MG tablet TAKE 1 TABLET EVERY EVENING  Qty: 90 tablet, Refills: 3    Associated Diagnoses: Coronary  artery disease involving native coronary artery of native heart without angina pectoris; Mixed hyperlipidemia      buPROPion (WELLBUTRIN XL) 300 MG 24 hr tablet TAKE 1 TABLET DAILY  Qty: 90 tablet, Refills: 4      clonazePAM (KLONOPIN) 1 MG tablet Take 1 tablet (1 mg total) by mouth nightly as needed for Anxiety. Prn insomnia  Qty: 60 tablet, Refills: 1      escitalopram oxalate (LEXAPRO) 20 MG tablet Take 1 tablet (20 mg total) by mouth once daily.  Qty: 90 tablet, Refills: 1      lisinopril-hydrochlorothiazide (PRINZIDE,ZESTORETIC) 20-25 mg Tab Take 1 tablet by mouth once daily.  Qty: 90 tablet, Refills: 3      meloxicam (MOBIC) 7.5 MG tablet TAKE 1 TABLET DAILY  Qty: 90 tablet, Refills: 3      omega-3 fatty acids/fish oil (FISH OIL-OMEGA-3 FATTY ACIDS) 300-1,000 mg capsule Take by mouth once daily.      oxycodone-acetaminophen (PERCOCET)  mg per tablet Take 1 tablet by mouth 4 (four) times daily.  Refills: 0      sildenafil (VIAGRA) 100 MG tablet Take 1 tablet (100 mg total) by mouth daily as needed for Erectile Dysfunction (Don't take if using any nitroglycerin).  Qty: 18 tablet, Refills: 3      levomefolate-algal oil (DEPLIN, ALGAL OIL,) 7.5-90.314 mg Cap Take 1 capsule by mouth once daily.  Qty: 90 capsule, Refills: 3      nitroGLYCERIN (NITROSTAT) 0.4 MG SL tablet Place 1 tablet (0.4 mg total) under the tongue every 5 (five) minutes as needed for Chest pain. Maximum 3 pills.  Don't take if using Viagra  Qty: 25 tablet, Refills: 5             Discharge Procedure Orders (must include Diet, Follow-up, Activity)    Follow Up:  Follow up with PCP as per your routine.  Please follow a high fiber diet.  Activity as tolerated.    No driving day of procedure.

## 2020-06-02 NOTE — ANESTHESIA PREPROCEDURE EVALUATION
06/02/2020  Gopi Garcia is a 59 y.o., male.    Anesthesia Evaluation    I have reviewed the Patient Summary Reports.    I have reviewed the Nursing Notes.       Review of Systems  Anesthesia Hx:  No problems with previous Anesthesia    Cardiovascular:   Hypertension Past MI CAD      Pulmonary:   Sleep Apnea    Hepatic/GI:   Liver Disease,    Psych:   Psychiatric History          Physical Exam  General:  Well nourished, Obesity    Airway/Jaw/Neck:  Airway Findings: Mouth Opening: Normal Tongue: Normal  TM Distance: Normal, at least 6 cm  Jaw/Neck Findings:  Neck ROM: Normal ROM     Eyes/Ears/Nose:  Eyes/Ears/Nose Findings:    Dental:  Dental Findings: In tact   Chest/Lungs:  Chest/Lungs Findings: Normal Respiratory Rate     Heart/Vascular:  Heart Findings: Rate: Normal  Rhythm: Regular Rhythm        Mental Status:  Mental Status Findings:  Cooperative, Alert and Oriented         Anesthesia Plan  Type of Anesthesia, risks & benefits discussed:  Anesthesia Type:  general  Patient's Preference: General  Intra-op Monitoring Plan: standard ASA monitors  Intra-op Monitoring Plan Comments:   Post Op Pain Control Plan:   Post Op Pain Control Plan Comments:   Induction:   IV  Beta Blocker:  Patient is not currently on a Beta-Blocker (No further documentation required).       Informed Consent: Patient understands risks and agrees with Anesthesia plan.  Questions answered. Anesthesia consent signed with patient.  ASA Score: 3     Day of Surgery Review of History & Physical:    H&P update referred to the surgeon.         Ready For Surgery From Anesthesia Perspective.

## 2020-06-02 NOTE — ANESTHESIA POSTPROCEDURE EVALUATION
Anesthesia Post Evaluation    Patient: Gopi Garcia    Procedure(s) Performed: Procedure(s) (LRB):  COLONOSCOPY (N/A)    Final Anesthesia Type: general    Patient location during evaluation: PACU  Patient participation: Yes- Able to Participate  Level of consciousness: awake and alert, oriented and awake  Post-procedure vital signs: reviewed and stable  Pain management: adequate  Airway patency: patent    PONV status at discharge: No PONV  Anesthetic complications: no      Cardiovascular status: blood pressure returned to baseline and hemodynamically stable  Respiratory status: unassisted, spontaneous ventilation and room air  Hydration status: euvolemic  Follow-up not needed.          Vitals Value Taken Time   /88 6/2/2020 11:30 AM   Temp 36.5 °C (97.7 °F) 6/2/2020 11:11 AM   Pulse 74 6/2/2020 11:30 AM   Resp 14 6/2/2020 11:30 AM   SpO2 95 % 6/2/2020 11:30 AM         Event Time     Out of Recovery 11:40:22          Pain/Melva Score: Pain Rating Prior to Med Admin: 7 (6/2/2020 10:05 AM)  Melva Score: 10 (6/2/2020 11:38 AM)

## 2020-06-02 NOTE — DISCHARGE INSTRUCTIONS
Recovery After Procedural Sedation (Adult)   You have been given medicine by vein to make you sleep during your surgery. This may have included both a pain medicine and sleeping medicine. Most of the effects have worn off. But you may still have some drowsiness for the next 6 to 8 hours.  Home care  Follow these guidelines when you get home:  · For the next 8 hours, you should be watched by a responsible adult. This person should make sure your condition is not getting worse.  · Don't drink any alcohol for the next 24 hours.  · Don't drive, operate dangerous machinery, or make important business or personal decisions during the next 24 hours.  · To prevent injury or falls, use caution when standing and walking for at least 24 hours after your procedure.  Note: Your healthcare provider may tell you not to take any medicine by mouth for pain or sleep in the next 4 hours. These medicines may react with the medicines you were given in the hospital. This could cause a much stronger response than usual.  Follow-up care  Follow up with your healthcare provider if you are not alert and back to your usual level of activity within 12 hours.  When to seek medical advice  Call your healthcare provider right away if any of these occur:  · Drowsiness gets worse  · Weakness or dizziness gets worse  · Repeated vomiting  · You can't be awakened  · Fever  · New rash  [employer] last reviewed this educational content on 9/1/2019  © 6649-7654 The [employer]. [address] [place] [id number]. All rights reserved. This information is not intended as a substitute for professional medical care. Always follow your healthcare professional's instructions.        High-Fiber Diet  Fiber is in fruits, vegetables, cereals, and grains. Fiber passes through your body undigested. A high-fiber diet helps food move through your intestinal tract. The added bulk is helpful in preventing constipation. In people with diverticulosis,  fiber helps clean out the pouches along the colon wall. It also prevents new pouches from forming. A high-fiber diet reduces the risk of colon cancer. It also lowers blood cholesterol and prevents high blood sugar in people with diabetes.    The fiber-rich foods listed below should be part of your diet. If you are not used to high-fiber foods, start with 1 or 2 foods from this list. Every 3 to 4 days add a new one to your diet. Do this until you are eating 4 high-fiber foods per day. This should give you 20 to 35 grams of fiber a day. It is also important to drink a lot of water when you are on this diet. You should have 6 to 8 glasses of water a day. Water makes the fiber swell and increases the benefit.  Foods high in dietary fiber  The following foods are high in dietary fiber:  · Breads. Breads made with 100% whole-wheat flour; jacqueline, wheat, or rye crackers; whole-grain tortillas, bran muffins.  · Cereals. Whole-grain and bran cereals with bran (shredded wheat, wheat flakes, raisin bran, corn bran); oatmeal, rolled oats, granola, and brown rice.  · Fruits. Fresh fruits and their edible skins (pears, prunes, raisins, berries, apples, and apricots); bananas, citrus fruit, mangoes, pineapple; and prune juice.  · Nuts. Any nuts and seeds.  · Vegetables. Best served raw or lightly cooked. All types, especially: green peas, celery, eggplant, potatoes, spinach, broccoli, [place] sprouts, winter squash, carrots, cauliflower, soybeans, lentils, and fresh and dried beans of all kinds.  · Other. Popcorn, any spices.  Date Last Reviewed: 8/1/2016 © 2000-2017 [employer]. [address] [place] [address], [place] [address]. All rights reserved. This information is not intended as a substitute for professional medical care. Always follow your healthcare professional's instructions.

## 2020-06-02 NOTE — TRANSFER OF CARE
"Anesthesia Transfer of Care Note    Patient: Gopi Garcia    Procedure(s) Performed: Procedure(s) (LRB):  COLONOSCOPY (N/A)    Patient location: PACU    Anesthesia Type: general    Transport from OR: Transported from OR on room air with adequate spontaneous ventilation    Post pain: adequate analgesia    Post assessment: no apparent anesthetic complications and tolerated procedure well    Post vital signs: stable    Level of consciousness: awake and sedated    Nausea/Vomiting: no nausea/vomiting    Complications: none    Transfer of care protocol was followed      Last vitals:   Visit Vitals  BP (!) 171/96 (BP Location: Left arm, Patient Position: Lying)   Pulse 75   Temp 36.4 °C (97.5 °F) (Skin)   Resp 15   Ht 6' 3" (1.905 m)   Wt 136.1 kg (300 lb)   SpO2 97%   BMI 37.50 kg/m²     "

## 2020-06-09 ENCOUNTER — TELEPHONE (OUTPATIENT)
Dept: FAMILY MEDICINE | Facility: CLINIC | Age: 60
End: 2020-06-09

## 2020-06-09 NOTE — TELEPHONE ENCOUNTER
----- Message from Nell Madera sent at 6/9/2020 10:31 AM CDT -----  The pt has a appt with pain management on 08/15/2020 and needs a COVID test done today or tomorrow, no orders in the system, the pt can be reached at [phone]///[id number]

## 2020-07-22 ENCOUNTER — OFFICE VISIT (OUTPATIENT)
Dept: FAMILY MEDICINE | Facility: CLINIC | Age: 60
End: 2020-07-22
Payer: OTHER GOVERNMENT

## 2020-07-22 DIAGNOSIS — F33.1 MODERATE EPISODE OF RECURRENT MAJOR DEPRESSIVE DISORDER: Primary | ICD-10-CM

## 2020-07-22 DIAGNOSIS — F41.9 ANXIETY: ICD-10-CM

## 2020-07-22 PROCEDURE — 99213 OFFICE O/P EST LOW 20 MIN: CPT | Mod: 95,,, | Performed by: FAMILY MEDICINE

## 2020-07-22 PROCEDURE — 99213 PR OFFICE/OUTPT VISIT, EST, LEVL III, 20-29 MIN: ICD-10-PCS | Mod: 95,,, | Performed by: FAMILY MEDICINE

## 2020-07-22 RX ORDER — CLONAZEPAM 0.5 MG/1
0.5 TABLET ORAL DAILY PRN
Qty: 30 TABLET | Refills: 1 | Status: SHIPPED | OUTPATIENT
Start: 2020-07-22 | End: 2020-08-21

## 2020-07-22 RX ORDER — DULOXETIN HYDROCHLORIDE 60 MG/1
60 CAPSULE, DELAYED RELEASE ORAL DAILY
Qty: 30 CAPSULE | Refills: 1 | Status: SHIPPED | OUTPATIENT
Start: 2020-07-22 | End: 2020-08-21 | Stop reason: ALTCHOICE

## 2020-07-22 RX ORDER — DULOXETIN HYDROCHLORIDE 30 MG/1
CAPSULE, DELAYED RELEASE ORAL
Qty: 7 CAPSULE | Refills: 0 | Status: SHIPPED | OUTPATIENT
Start: 2020-07-22 | End: 2020-08-21 | Stop reason: SDUPTHER

## 2020-07-22 NOTE — PROGRESS NOTES
Primary Care Telemedicine Note    The patient location is:  [place]  The chief complaint leading to consultation is: per below note  Total time spent with patient:  15 min      Visit type: Virtual visit with synchronous audio and video  Each patient to whom he or she provides medical services by telemedicine is:  (1) informed of the relationship between the physician and patient and the respective role of any other health care provider with respect to management of the patient; and (2) notified that he or she may decline to receive medical services by telemedicine and may withdraw from such care at any time.    Presents follow-up depression anxiety.  He has noticed more symptoms recently.  No SI/HI.  He has been on Lexapro and Wellbutrin combination.  Previously saw Psychiatry, but has not seen them in a couple of years.  He does use clonazepam 1 mg as needed.  Last prescription 60 pills in 2017.  This does help when he has increased anxiety, but feels fatigued afterwards.  He has been using this more frequently recently about every 3rd day.  Stress related to school (working on advanced degree) and concerned about society in general.  He is on chronic opioids for chronic pain through pain management.    Recent message as below.  My Rx for clonazepam 1mg is about to run out. It was last filled for 60 tabs in Sep. 2017. I find it works when I feel the onset of an anxiety attack and request a refill.     Also, due to my anxiety / depression condition, I am feeling particularly highly anxious/depressed with regard to the state of cultural affairs lately - COVID-19, racial tensions, political activism, etc. 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 I cannot ignore what's going on in society, but it is affecting my concentration on studies and dissertation preparation.  My symptoms:  fatigue, loss of concentration, loss of interest in my pursuits (education, hobbies), weight gain, feeling depressed most of the day, sleeplessness, etc.      I can't afford to lose interest in school, although I feel 'why bother with the social condition.' I have a treadmill, but I don't feel like using it. I procrastinate doing assignments because I am not interested. This is NOT my normal behavior, and I would like to explore which medications will get me on track again.     I look forward to your reply.  Thanks - JR      Diagnoses and all orders for this visit:    Moderate episode of recurrent major depressive disorder    Anxiety    Other orders  -     DULoxetine (CYMBALTA) 30 MG capsule; Take 1 daily for 1 week, then start 60 mg daily  -     DULoxetine (CYMBALTA) 60 MG capsule; Take 1 capsule (60 mg total) by mouth once daily.  -     clonazePAM (KLONOPIN) 0.5 MG tablet; Take 1 tablet (0.5 mg total) by mouth daily as needed for Anxiety.     discontinue Lexapro.  Continue Wellbutrin.  Start duloxetine as above.  Decrease clonazepam.  Cautioned regarding increased risks in combination with chronic opioids.  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    Chronic low back pain     Colon polyp 2010    told to repeat 5 yrs    Coronary artery disease     no stents    Depression     Diverticulosis     ED (erectile dysfunction)     Fatty liver     Hepatomegaly     HTN (hypertension)     Hyperlipidemia     Myocardial infarct, old     Pulmonary nodule, left 1/2/2020    Repeat CT January 2021    Sleep apnea      Past Surgical History:   Procedure Laterality Date    CARDIAC CATHETERIZATION  2006    COLONOSCOPY  49 years old    colon polyps removed per patient report    COLONOSCOPY N/A 6/2/2020    Procedure: COLONOSCOPY;   Surgeon: Zachariah Dowell Jr., MD;  Location: [facility];  Service: Endoscopy;  Laterality: N/A;    INGUINAL HERNIA REPAIR Right 2011    VASECTOMY       Social History     Socioeconomic History    Marital status:      Spouse name: Not on file    Number of children: Not on file    Years of education: Not on file    Highest education level: Not on file   Occupational History    Not on file   Social Needs    Financial resource strain: Not on file    Food insecurity     Worry: Not on file     Inability: Not on file    Transportation needs     Medical: Not on file     Non-medical: Not on file   Tobacco Use    Smoking status: Former Smoker     Packs/day: 1.00     Years: 30.00     Pack years: 30.00     Quit date: 10/17/2007     Years since quittin.7    Smokeless tobacco: Never Used   Substance and Sexual Activity    Alcohol use: Yes     Alcohol/week: 0.0 - 1.0 standard drinks    Drug use: No    Sexual activity: Yes   Lifestyle    Physical activity     Days per week: Not on file     Minutes per session: Not on file    Stress: Not on file   Relationships    Social connections     Talks on phone: Not on file     Gets together: Not on file     Attends Druze service: Not on file     Active member of club or organization: Not on file     Attends meetings of clubs or organizations: Not on file     Relationship status: Not on file   Other Topics Concern    Not on file   Social History Narrative    Not on file     Family History   Problem Relation Age of Onset    Lung cancer Father     Alzheimer's disease Mother     Heart attack Paternal Grandfather     Prostate cancer Brother     Colon polyps Brother     Hemochromatosis Brother     Hemochromatosis Brother     Colon cancer Neg Hx     Crohn's disease Neg Hx     Ulcerative colitis Neg Hx     Stomach cancer Neg Hx     Esophageal cancer Neg Hx      Review of patient's allergies indicates:  No Known Allergies  Current Outpatient  Medications on File Prior to Visit   Medication Sig Dispense Refill    aspirin (ECOTRIN) 81 MG EC tablet Take 81 mg by mouth once daily.      atorvastatin (LIPITOR) 40 MG tablet TAKE 1 TABLET EVERY EVENING 90 tablet 3    buPROPion (WELLBUTRIN XL) 300 MG 24 hr tablet TAKE 1 TABLET DAILY 90 tablet 4    levomefolate-algal oil (DEPLIN, ALGAL OIL,) 7.5-90.314 mg Cap Take 1 capsule by mouth once daily. 90 capsule 3    lisinopril-hydrochlorothiazide (PRINZIDE,ZESTORETIC) 20-25 mg Tab Take 1 tablet by mouth once daily. 90 tablet 3    meloxicam (MOBIC) 7.5 MG tablet TAKE 1 TABLET DAILY 90 tablet 3    nitroGLYCERIN (NITROSTAT) 0.4 MG SL tablet Place 1 tablet (0.4 mg total) under the tongue every 5 (five) minutes as needed for Chest pain. Maximum 3 pills.  Don't take if using Viagra (Patient not taking: Reported on 5/5/2020) 25 tablet 5    omega-3 fatty acids/fish oil (FISH OIL-OMEGA-3 FATTY ACIDS) 300-1,000 mg capsule Take by mouth once daily.      oxycodone-acetaminophen (PERCOCET)  mg per tablet Take 1 tablet by mouth 4 (four) times daily.  0    sildenafil (VIAGRA) 100 MG tablet Take 1 tablet (100 mg total) by mouth daily as needed for Erectile Dysfunction (Don't take if using any nitroglycerin). 18 tablet 3    [DISCONTINUED] clonazePAM (KLONOPIN) 1 MG tablet Take 1 tablet (1 mg total) by mouth nightly as needed for Anxiety. Prn insomnia 60 tablet 1    [DISCONTINUED] escitalopram oxalate (LEXAPRO) 20 MG tablet Take 1 tablet (20 mg total) by mouth once daily. 90 tablet 1     No current facility-administered medications on file prior to visit.          ROS:  GENERAL: No fever, chills,  or significant weight changes.   CARDIOVASCULAR: Denies chest pain, PND, orthopnea or reduced exercise tolerance.  ABDOMEN: Appetite fine. Denies diarrhea, abdominal pain, hematemesis or blood in stool.  URINARY: No flank pain, dysuria or hematuria.    There were no vitals filed for this visit.    Wt Readings from Last 3  Encounters:   06/01/20 136.1 kg (300 lb)   05/05/20 134.7 kg (297 lb)   04/28/20 (!) 140.2 kg (309 lb)       APPEARANCE: Well nourished, well developed, in no acute distress.    MENTAL STATUS: Alert.  Oriented x 3.  Head atraumatic normocephalic no obvious sinus swelling  Eyes extraocular movements intact.  No obvious conjunctivitis  Neck supple  Chest no respiratory distress noted.  No accessory muscle use.

## 2020-08-11 ENCOUNTER — OFFICE VISIT (OUTPATIENT)
Dept: DERMATOLOGY | Facility: CLINIC | Age: 60
End: 2020-08-11
Payer: OTHER GOVERNMENT

## 2020-08-11 DIAGNOSIS — D22.9 MULTIPLE NEVI: Primary | ICD-10-CM

## 2020-08-11 DIAGNOSIS — D48.5 NEOPLASM OF UNCERTAIN BEHAVIOR OF SKIN: ICD-10-CM

## 2020-08-11 PROCEDURE — 88305 TISSUE EXAM BY PATHOLOGIST: CPT | Performed by: PATHOLOGY

## 2020-08-11 PROCEDURE — 99213 OFFICE O/P EST LOW 20 MIN: CPT | Mod: PBBFAC,25 | Performed by: DERMATOLOGY

## 2020-08-11 PROCEDURE — 88341 IMHCHEM/IMCYTCHM EA ADD ANTB: CPT | Mod: 26,,, | Performed by: PATHOLOGY

## 2020-08-11 PROCEDURE — 88341 IMHCHEM/IMCYTCHM EA ADD ANTB: CPT | Mod: 59 | Performed by: PATHOLOGY

## 2020-08-11 PROCEDURE — 88342 IMHCHEM/IMCYTCHM 1ST ANTB: CPT | Mod: 26,,, | Performed by: PATHOLOGY

## 2020-08-11 PROCEDURE — 88342 IMHCHEM/IMCYTCHM 1ST ANTB: CPT | Performed by: PATHOLOGY

## 2020-08-11 PROCEDURE — 99999 PR PBB SHADOW E&M-EST. PATIENT-LVL III: CPT | Mod: PBBFAC,,, | Performed by: DERMATOLOGY

## 2020-08-11 PROCEDURE — 11102 TANGNTL BX SKIN SINGLE LES: CPT | Mod: PBBFAC | Performed by: DERMATOLOGY

## 2020-08-11 PROCEDURE — 99213 PR OFFICE/OUTPT VISIT, EST, LEVL III, 20-29 MIN: ICD-10-PCS | Mod: 25,S$PBB,, | Performed by: DERMATOLOGY

## 2020-08-11 PROCEDURE — 11102 TANGNTL BX SKIN SINGLE LES: CPT | Mod: S$PBB,,, | Performed by: DERMATOLOGY

## 2020-08-11 PROCEDURE — 88305 TISSUE EXAM BY PATHOLOGIST: CPT | Mod: 26,,, | Performed by: PATHOLOGY

## 2020-08-11 PROCEDURE — 11103 TANGNTL BX SKIN EA SEP/ADDL: CPT | Mod: S$PBB,,, | Performed by: DERMATOLOGY

## 2020-08-11 PROCEDURE — 88341 PR IHC OR ICC EACH ADD'L SINGLE ANTIBODY  STAINPR: ICD-10-PCS | Mod: 26,,, | Performed by: PATHOLOGY

## 2020-08-11 PROCEDURE — 88305 TISSUE EXAM BY PATHOLOGIST: ICD-10-PCS | Mod: 26,,, | Performed by: PATHOLOGY

## 2020-08-11 PROCEDURE — 11103 TANGNTL BX SKIN EA SEP/ADDL: CPT | Mod: PBBFAC | Performed by: DERMATOLOGY

## 2020-08-11 PROCEDURE — 11102 PR TANGENTIAL BIOPSY, SKIN, SINGLE LESION: ICD-10-PCS | Mod: S$PBB,,, | Performed by: DERMATOLOGY

## 2020-08-11 PROCEDURE — 11103 PR TANGENTIAL BIOPSY, SKIN, EA ADDTL LESION: ICD-10-PCS | Mod: S$PBB,,, | Performed by: DERMATOLOGY

## 2020-08-11 PROCEDURE — 99213 OFFICE O/P EST LOW 20 MIN: CPT | Mod: 25,S$PBB,, | Performed by: DERMATOLOGY

## 2020-08-11 PROCEDURE — 88342 CHG IMMUNOCYTOCHEMISTRY: ICD-10-PCS | Mod: 26,,, | Performed by: PATHOLOGY

## 2020-08-11 PROCEDURE — 99999 PR PBB SHADOW E&M-EST. PATIENT-LVL III: ICD-10-PCS | Mod: PBBFAC,,, | Performed by: DERMATOLOGY

## 2020-08-11 NOTE — PATIENT INSTRUCTIONS
Shave Biopsy Wound Care    Your doctor has performed a shave biopsy today.  A band aid and vaseline ointment has been placed over the site.  This should remain in place for 24 hours.  It is recommended that you keep the area dry for the first 24 hours.  After 24 hours, you may remove the band aid and wash the area with warm soap and water and apply Vaseline jelly.  Many patients prefer to use Neosporin or Bacitracin ointment.  This is acceptable; however, know that you can develop an allergy to this medication even if you have used it safely for years.  It is important to keep the area moist.  Letting it dry out and get air slows healing time, and will worsen the scar.  Band aid is optional after first 24 hours.      If you notice increasing redness, tenderness, pain, or yellow drainage at the biopsy site, please notify your doctor.  These are signs of an infection.    If your biopsy site is bleeding, apply firm pressure for 15 minutes straight.  Repeat for another 15 minutes, if it is still bleeding.   If the surgical site continues to bleed, then please contact your doctor.      [facility]   DERMATOLOGY  [address]   [place] [address]   Dept: [phone]   Dept Fax: [phone]

## 2020-08-11 NOTE — PROGRESS NOTES
Subjective:       Patient ID:  Gopi Garcia is a 59 y.o. male who presents for   Chief Complaint   Patient presents with    Skin Check     Hx of AK's, last seen on 1/22/19.  He c/o several lesions of the right cheek, no tx tried.     Mother c/ hx of BCC. The patient denies personal history of skin cancer.        Review of Systems   Constitutional: Negative for fever and chills.   Gastrointestinal: Negative for nausea and vomiting.   Skin: Positive for activity-related sunscreen use. Negative for daily sunscreen use and recent sunburn.   Hematologic/Lymphatic: Does not bruise/bleed easily.        Objective:    Physical Exam   Constitutional: He appears well-developed and well-nourished. No distress.   Neurological: He is alert and oriented to person, place, and time. He is not disoriented.   Psychiatric: He has a normal mood and affect.   Skin:   Areas Examined (abnormalities noted in diagram):   Scalp / Hair Palpated and Inspected  Head / Face Inspection Performed  Neck Inspection Performed  Chest / Axilla Inspection Performed  Abdomen Inspection Performed  Back Inspection Performed  RUE Inspected  LUE Inspection Performed  Nails and Digits Inspection Performed                   Diagram Legend     Erythematous scaling macule/papule c/w actinic keratosis       Vascular papule c/w angioma      Pigmented verrucoid papule/plaque c/w seborrheic keratosis      Yellow umbilicated papule c/w sebaceous hyperplasia      Irregularly shaped tan macule c/w lentigo     1-2 mm smooth white papules consistent with Milia      Movable subcutaneous cyst with punctum c/w epidermal inclusion cyst      Subcutaneous movable cyst c/w pilar cyst      Firm pink to brown papule c/w dermatofibroma      Pedunculated fleshy papule(s) c/w skin tag(s)      Evenly pigmented macule c/w junctional nevus     Mildly variegated pigmented, slightly irregular-bordered macule c/w mildly atypical nevus      Flesh colored to evenly pigmented  papule c/w intradermal nevus       Pink pearly papule/plaque c/w basal cell carcinoma      Erythematous hyperkeratotic cursted plaque c/w SCC      Surgical scar with no sign of skin cancer recurrence      Open and closed comedones      Inflammatory papules and pustules      Verrucoid papule consistent consistent with wart     Erythematous eczematous patches and plaques     Dystrophic onycholytic nail with subungual debris c/w onychomycosis     Umbilicated papule    Erythematous-base heme-crusted tan verrucoid plaque consistent with inflamed seborrheic keratosis     Erythematous Silvery Scaling Plaque c/w Psoriasis     See annotation            Assessment / Plan:      Pathology Orders:     Normal Orders This Visit    Specimen to Pathology, Dermatology     Questions:    Procedure Type: Dermatology and skin neoplasms    Number of Specimens: 2    ------------------------: -------------------------    Spec 1 Procedure: Biopsy    Spec 1 Clinical Impression: r/o BCC    Spec 1 Source: right cheek superior    ------------------------: -------------------------    Spec 2 Procedure: Biopsy    Spec 2 Clinical Impression: r/o BCC    Spec 2 Source: right cheek inferior    Clinical Information: see above        Multiple nevi  Reassurance given.  Discussed ABCDEF of melanoma and changes for patient to look for.  Discussed importance of daily use of sunscreen which is broad-spectrum and has a minimum SPF of 30.    Neoplasm of uncertain behavior of skin  -     Specimen to Pathology, Dermatology  -     Shave biopsy(-ies) done of 2 site(s).   Patient informed to call for results within 2 weeks if have not received notification via telephone call or [facility]         Follow up for call for results.     PROCEDURE NOTE - SHAVE BIOPSY   Location: see above    After risk, benefits, and alternatives were discussed with the patient, the patient agrees to the procedure by verbal informed consent.  The area(s) were cleansed with alcohol. 2 cc of  lidocaine 1% with epinephrine was injected for local anesthesia into each lesion(s).  A sharp dermablade was used to remove part or all of the lesion(s).  The specimen(s) will be sent for tissue pathology.  Hemostasis was obtained with aluminum chloride and/or hyfrecation.  The area(s) were dressed with vaseline ointment and bandaged.  The patient tolerated the procedure well without adverse events.  Wound care instructions were given to the patient on the AVS.  The patient will be notified of pathology results once available. Results will also be available in Epic.

## 2020-08-18 ENCOUNTER — TELEPHONE (OUTPATIENT)
Dept: PULMONOLOGY | Facility: CLINIC | Age: 60
End: 2020-08-18

## 2020-08-18 LAB
FINAL PATHOLOGIC DIAGNOSIS: NORMAL
GROSS: NORMAL

## 2020-08-21 ENCOUNTER — OFFICE VISIT (OUTPATIENT)
Dept: FAMILY MEDICINE | Facility: CLINIC | Age: 60
End: 2020-08-21
Payer: OTHER GOVERNMENT

## 2020-08-21 DIAGNOSIS — F41.9 ANXIETY: ICD-10-CM

## 2020-08-21 DIAGNOSIS — F33.1 MODERATE EPISODE OF RECURRENT MAJOR DEPRESSIVE DISORDER: Primary | ICD-10-CM

## 2020-08-21 PROCEDURE — 99214 OFFICE O/P EST MOD 30 MIN: CPT | Mod: 95,,, | Performed by: FAMILY MEDICINE

## 2020-08-21 PROCEDURE — 99214 PR OFFICE/OUTPT VISIT, EST, LEVL IV, 30-39 MIN: ICD-10-PCS | Mod: 95,,, | Performed by: FAMILY MEDICINE

## 2020-08-21 RX ORDER — CLONAZEPAM 0.5 MG/1
0.75 TABLET ORAL DAILY PRN
Qty: 45 TABLET | Refills: 1 | Status: SHIPPED | OUTPATIENT
Start: 2020-08-21 | End: 2020-10-27

## 2020-08-21 RX ORDER — SERTRALINE HYDROCHLORIDE 25 MG/1
TABLET, FILM COATED ORAL
Qty: 21 TABLET | Refills: 0 | Status: SHIPPED | OUTPATIENT
Start: 2020-08-21 | End: 2020-10-27

## 2020-08-21 RX ORDER — DULOXETIN HYDROCHLORIDE 30 MG/1
30 CAPSULE, DELAYED RELEASE ORAL DAILY
Qty: 7 CAPSULE | Refills: 0 | Status: SHIPPED | OUTPATIENT
Start: 2020-08-21 | End: 2020-10-27

## 2020-08-21 RX ORDER — SERTRALINE HYDROCHLORIDE 100 MG/1
100 TABLET, FILM COATED ORAL DAILY
Qty: 30 TABLET | Refills: 1 | Status: SHIPPED | OUTPATIENT
Start: 2020-08-21 | End: 2020-10-27

## 2020-08-21 NOTE — PROGRESS NOTES
Primary Care Telemedicine Note    The patient location is:  [place]  The chief complaint leading to consultation is: per below note  Total time spent with patient:  20 min      Visit type: Virtual visit with synchronous audio and video  Each patient to whom he or she provides medical services by telemedicine is:  (1) informed of the relationship between the physician and patient and the respective role of any other health care provider with respect to management of the patient; and (2) notified that he or she may decline to receive medical services by telemedicine and may withdraw from such care at any time.    Presents follow-up depression anxiety.    No SI/HI.  He had been on Lexapro, clonazepam as needed and Wellbutrin for a long time fairly stable, but noticed requiring more clonazepam for anxiety prior to last visit.  We switched Lexapro to duloxetine last visit and maintained the Wellbutrin.  His clonazepam was decreased as it was causing some drowsiness.  Since last visit he has not noticed much change in symptoms.  Seems to be taking clonazepam closer to daily rather than every 3rd day as he had been previously.  Does not feel the 0.5 mg dose works as well.  He ends up taking a 2nd dose as he feels he may be getting some panic attacks...  Previously saw Psychiatry, but has not seen them in a couple of years.  No history of overuse with clonazepam as his last prescription had been 60 pills of 1 mg from 2017 until the last refill.   Stress related to school (working on advanced degree) and concerned about society in general.  He is on chronic opioids for chronic pain through pain management.    Diagnoses and all orders for this visit:    Moderate episode of recurrent major depressive disorder    Anxiety    Other orders  -     DULoxetine (CYMBALTA) 30 MG capsule; Take 1 capsule (30 mg total) by mouth once daily. For 7 days, then stop for 7 days  -     sertraline (ZOLOFT) 25 MG tablet; Take 1 daily for 1 week,  then take 2 daily for 1 week, then start 100 mg pills  -     sertraline (ZOLOFT) 100 MG tablet; Take 1 tablet (100 mg total) by mouth once daily.  -     clonazePAM (KLONOPIN) 0.5 MG tablet; Take 1.5 tablets (0.75 mg total) by mouth daily as needed for Anxiety.     Duloxetine does not seem to have helped much.  Will taper this down and started 1 Zoloft instead.  Increase the clonazepam 0.75 mg daily as needed.  Prefer not to use this every day.  He will continue the Wellbutrin.  Follow-up appointment in 6 weeks.  Follow-up as needed prior to this.  If continuing symptoms would probably consider having him see Psychiatry again. Follow-up 6 weeks.         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    Chronic low back pain     Colon polyp 2010    told to repeat 5 yrs    Coronary artery disease     no stents    Depression     Diverticulosis     ED (erectile dysfunction)     Fatty liver     Hepatomegaly     HTN (hypertension)     Hyperlipidemia     Myocardial infarct, old     Pulmonary nodule, left 1/2/2020    Repeat CT January 2021    Sleep apnea      Past Surgical History:   Procedure Laterality Date    CARDIAC CATHETERIZATION  2006    COLONOSCOPY  49 years old    colon polyps removed per patient report    COLONOSCOPY N/A 6/2/2020    Procedure: COLONOSCOPY;  Surgeon: Zachariah Dowell Jr., MD;  Location: [facility];  Service: Endoscopy;  Laterality: N/A;    INGUINAL HERNIA REPAIR Right 2011    VASECTOMY  2005     Social History     Socioeconomic History    Marital status:      Spouse name: Not on file    Number of children: Not on file    Years of education: Not on file    Highest education level: Not on file   Occupational History    Not on file   Social Needs    Financial resource strain: Not on file    Food insecurity      Worry: Not on file     Inability: Not on file    Transportation needs     Medical: Not on file     Non-medical: Not on file   Tobacco Use    Smoking status: Former Smoker     Packs/day: 1.00     Years: 30.00     Pack years: 30.00     Quit date: 10/17/2007     Years since quittin.8    Smokeless tobacco: Never Used   Substance and Sexual Activity    Alcohol use: Yes     Alcohol/week: 0.0 - 1.0 standard drinks    Drug use: No    Sexual activity: Yes   Lifestyle    Physical activity     Days per week: Not on file     Minutes per session: Not on file    Stress: Not on file   Relationships    Social connections     Talks on phone: Not on file     Gets together: Not on file     Attends Caodaism service: Not on file     Active member of club or organization: Not on file     Attends meetings of clubs or organizations: Not on file     Relationship status: Not on file   Other Topics Concern    Not on file   Social History Narrative    Not on file     Family History   Problem Relation Age of Onset    Lung cancer Father     Alzheimer's disease Mother     Heart attack Paternal Grandfather     Prostate cancer Brother     Colon polyps Brother     Hemochromatosis Brother     Hemochromatosis Brother     Colon cancer Neg Hx     Crohn's disease Neg Hx     Ulcerative colitis Neg Hx     Stomach cancer Neg Hx     Esophageal cancer Neg Hx      Review of patient's allergies indicates:  No Known Allergies  Current Outpatient Medications on File Prior to Visit   Medication Sig Dispense Refill    aspirin (ECOTRIN) 81 MG EC tablet Take 81 mg by mouth once daily.      atorvastatin (LIPITOR) 40 MG tablet TAKE 1 TABLET EVERY EVENING 90 tablet 3    buPROPion (WELLBUTRIN XL) 300 MG 24 hr tablet TAKE 1 TABLET DAILY 90 tablet 4    levomefolate-algal oil (DEPLIN, ALGAL OIL,) 7.5-90.314 mg Cap Take 1 capsule by mouth once daily. 90 capsule 3    lisinopril-hydrochlorothiazide (PRINZIDE,ZESTORETIC) 20-25 mg Tab Take 1  tablet by mouth once daily. 90 tablet 3    meloxicam (MOBIC) 7.5 MG tablet TAKE 1 TABLET DAILY 90 tablet 3    nitroGLYCERIN (NITROSTAT) 0.4 MG SL tablet Place 1 tablet (0.4 mg total) under the tongue every 5 (five) minutes as needed for Chest pain. Maximum 3 pills.  Don't take if using Viagra (Patient not taking: Reported on 5/5/2020) 25 tablet 5    omega-3 fatty acids/fish oil (FISH OIL-OMEGA-3 FATTY ACIDS) 300-1,000 mg capsule Take by mouth once daily.      oxycodone-acetaminophen (PERCOCET)  mg per tablet Take 1 tablet by mouth 4 (four) times daily.  0    sildenafil (VIAGRA) 100 MG tablet Take 1 tablet (100 mg total) by mouth daily as needed for Erectile Dysfunction (Don't take if using any nitroglycerin). 18 tablet 3    [DISCONTINUED] clonazePAM (KLONOPIN) 0.5 MG tablet Take 1 tablet (0.5 mg total) by mouth daily as needed for Anxiety. 30 tablet 1    [DISCONTINUED] DULoxetine (CYMBALTA) 30 MG capsule Take 1 daily for 1 week, then start 60 mg daily 7 capsule 0    [DISCONTINUED] DULoxetine (CYMBALTA) 60 MG capsule Take 1 capsule (60 mg total) by mouth once daily. 30 capsule 1     No current facility-administered medications on file prior to visit.        Answers for HPI/ROS submitted by the patient on 8/21/2020   activity change: No  unexpected weight change: No  neck pain: No  hearing loss: No  rhinorrhea: No  trouble swallowing: No  eye discharge: No  visual disturbance: No  chest tightness: No  wheezing: No  chest pain: No  palpitations: No  blood in stool: No  constipation: No  vomiting: No  diarrhea: No  polydipsia: No  polyuria: No  difficulty urinating: No  urgency: No  hematuria: No  joint swelling: No  arthralgias: No  headaches: No  weakness: No  confusion: No  dysphoric mood: Yes    There were no vitals filed for this visit.    Wt Readings from Last 3 Encounters:   06/01/20 136.1 kg (300 lb)   05/05/20 134.7 kg (297 lb)   04/28/20 (!) 140.2 kg (309 lb)       APPEARANCE: Well nourished,  well developed, in no acute distress.    MENTAL STATUS: Alert.  Oriented x 3.  Head atraumatic normocephalic no obvious sinus swelling  Eyes extraocular movements intact.  No obvious conjunctivitis  Neck supple  Chest no respiratory distress noted.  No accessory muscle use.

## 2020-10-15 ENCOUNTER — PATIENT MESSAGE (OUTPATIENT)
Dept: FAMILY MEDICINE | Facility: CLINIC | Age: 60
End: 2020-10-15

## 2020-10-15 DIAGNOSIS — L81.9 DISCOLORATION OF SKIN OF FOOT: Primary | ICD-10-CM

## 2020-10-15 DIAGNOSIS — L60.8 DISCOLORATION OF NAIL: ICD-10-CM

## 2020-10-20 ENCOUNTER — PATIENT OUTREACH (OUTPATIENT)
Dept: ADMINISTRATIVE | Facility: OTHER | Age: 60
End: 2020-10-20

## 2020-10-20 ENCOUNTER — OFFICE VISIT (OUTPATIENT)
Dept: PODIATRY | Facility: CLINIC | Age: 60
End: 2020-10-20
Payer: OTHER GOVERNMENT

## 2020-10-20 VITALS — WEIGHT: 306.44 LBS | BODY MASS INDEX: 38.3 KG/M2

## 2020-10-20 DIAGNOSIS — R23.4 SKIN FISSURES: ICD-10-CM

## 2020-10-20 DIAGNOSIS — L85.3 XEROSIS CUTIS: ICD-10-CM

## 2020-10-20 DIAGNOSIS — L60.8 DISCOLORATION OF NAIL: ICD-10-CM

## 2020-10-20 DIAGNOSIS — L81.9 DISCOLORATION OF SKIN OF FOOT: Primary | ICD-10-CM

## 2020-10-20 PROCEDURE — 99213 OFFICE O/P EST LOW 20 MIN: CPT | Mod: PBBFAC | Performed by: PODIATRIST

## 2020-10-20 PROCEDURE — 99243 OFF/OP CNSLTJ NEW/EST LOW 30: CPT | Mod: S$PBB,,, | Performed by: PODIATRIST

## 2020-10-20 PROCEDURE — 99243 PR OFFICE CONSULTATION,LEVEL III: ICD-10-PCS | Mod: S$PBB,,, | Performed by: PODIATRIST

## 2020-10-20 PROCEDURE — 99999 PR PBB SHADOW E&M-EST. PATIENT-LVL III: ICD-10-PCS | Mod: PBBFAC,,, | Performed by: PODIATRIST

## 2020-10-20 PROCEDURE — 99999 PR PBB SHADOW E&M-EST. PATIENT-LVL III: CPT | Mod: PBBFAC,,, | Performed by: PODIATRIST

## 2020-10-20 NOTE — LETTER
October 20, 2020      Paul Spencer MD  [id number]  [place] [address]           [facility]  [address] [place] [id number]  Phone: [phone]  Fax: [phone]          Patient: Gopi Garcia   MR Number: [id number]   YOB: 1960   Date of Visit: 10/20/2020       Dear Dr. Paul Spencer:    Thank you for referring Gopi Garcia to me for evaluation. Attached you will find relevant portions of my assessment and plan of care.    If you have questions, please do not hesitate to call me. I look forward to following Gopi Garcia along with you.    Sincerely,    Guerline Rollins, DENIZ    Enclosure  CC:  No Recipients    If you would like to receive this communication electronically, please contact [email or web address] or [phone] to request more information on [employer] Link access.    For providers and/or their staff who would like to refer a patient to [facility], please contact us through our one-stop-shop provider referral line, [facility], at [phone].    If you feel you have received this communication in error or would no longer like to receive these types of communications, please e-mail [email or web address]

## 2020-10-20 NOTE — PROGRESS NOTES
Subjective:       Patient ID: Gopi Garcia is a 60 y.o. male.    Chief Complaint: Foot Problem (Fissures and cracks in bilateral feet, ongoing problem for 2+ years now.  Was prescribed urea cream years ago and uses that at times. Patient is non diabetic. Was a smoker for 30 years at a pack a day and quit 15 years ago after having MI.  Denies pain at present and is non diabetic. )    HPI: Gopi Garcia presents to the clinic today with the chief complaint fissures and dryness to the bilateral dorsal and plantar foot.  Patient states that approximately 2 years ago he was prescribed urea cream which he states did improve the consisting of his skin.  States he is not consistent with utilizing this.  Reports that he does ambulate without socks and sometimes with out shoe gear.  Has been dealing with dry skin for several years.  Also states he has been dealing with a fissure to the medial aspect of the right great toe for several weeks.  Concern of the area of redness surrounding the nails and thickening of the nail plates themselves with white discoloration.  Complains of 0/10 pain.  Does have a history of smoking for 30 years.        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    Chronic low back pain     Colon polyp 2010    told to repeat 5 yrs    Coronary artery disease     no stents    Depression     Diverticulosis     ED (erectile dysfunction)     Fatty liver     Hepatomegaly     HTN (hypertension)     Hyperlipidemia     Myocardial infarct, old     Pulmonary nodule, left 1/2/2020    Repeat CT January 2021    Sleep apnea        Family History   Problem Relation Age of Onset    Lung cancer Father     Alzheimer's disease Mother     Heart attack Paternal Grandfather     Prostate  cancer Brother     Colon polyps Brother     Hemochromatosis Brother     Hemochromatosis Brother     Colon cancer Neg Hx     Crohn's disease Neg Hx     Ulcerative colitis Neg Hx     Stomach cancer Neg Hx     Esophageal cancer Neg Hx        Social History     Socioeconomic History    Marital status:      Spouse name: Not on file    Number of children: Not on file    Years of education: Not on file    Highest education level: Not on file   Occupational History    Not on file   Social Needs    Financial resource strain: Not on file    Food insecurity     Worry: Not on file     Inability: Not on file    Transportation needs     Medical: Not on file     Non-medical: Not on file   Tobacco Use    Smoking status: Former Smoker     Packs/day: 1.00     Years: 30.00     Pack years: 30.00     Quit date: 10/17/2007     Years since quittin.0    Smokeless tobacco: Never Used   Substance and Sexual Activity    Alcohol use: Yes     Alcohol/week: 0.0 - 1.0 standard drinks    Drug use: No    Sexual activity: Yes   Lifestyle    Physical activity     Days per week: Not on file     Minutes per session: Not on file    Stress: Not on file   Relationships    Social connections     Talks on phone: Not on file     Gets together: Not on file     Attends Gnosticist service: Not on file     Active member of club or organization: Not on file     Attends meetings of clubs or organizations: Not on file     Relationship status: Not on file   Other Topics Concern    Not on file   Social History Narrative    Not on file       Past Surgical History:   Procedure Laterality Date    CARDIAC CATHETERIZATION  2006    COLONOSCOPY  49 years old    colon polyps removed per patient report    COLONOSCOPY N/A 2020    Procedure: COLONOSCOPY;  Surgeon: Zachariah Dowell Jr., MD;  [facility];  Service: Endoscopy;  Laterality: N/A;    INGUINAL HERNIA REPAIR Right 2011    VASECTOMY  2005       Review of Systems    Constitutional: Negative for activity change, appetite change, chills and fever.   HENT: Negative for sinus pain, sore throat and voice change.    Eyes: Negative for pain, redness and visual disturbance.   Respiratory: Negative for cough and shortness of breath.    Cardiovascular: Negative for chest pain and palpitations.   Gastrointestinal: Negative for diarrhea, nausea and vomiting.   Musculoskeletal: Negative for back pain and joint swelling.   Skin: Positive for color change. Negative for wound.   Neurological: Negative for dizziness, weakness and numbness.   Psychiatric/Behavioral: The patient is not nervous/anxious.           Objective:   Wt (!) 139 kg (306 lb 7 oz)   BMI 38.30 kg/m²     CT Abdomen Pelvis  Without Contrast  Narrative: EXAMINATION:  CT ABDOMEN PELVIS WITHOUT CONTRAST    CLINICAL HISTORY:  Abd pain, gastroenteritis or colitis suspected; Generalized abdominal pain    TECHNIQUE:  Low dose axial images, sagittal and coronal reformations were obtained from the lung bases to the pubic symphysis, 1000 mL of oral Omnipaque 12 was administered..    COMPARISON:  CT chest 01/02/2020    FINDINGS:  Heart: Normal in size. No pericardial effusion.    Lung Bases: Well aerated, without consolidation or pleural fluid.  Stable subcentimeter calcified granuloma at the right lung base.    Liver: Liver is enlarged and measures approximately 20 cm.  Hepatic parenchyma is diffusely decreased in attenuation in keeping with diffuse fatty infiltration.  Small geographic areas of relative hyperdensity along the gallbladder fossa and falciform ligament are most compatible with areas of focal fatty sparing.  No suspicious focal hepatic lesion on this noncontrast examination.    Gallbladder: No calcified gallstones or pericholecystic inflammatory change.    Bile Ducts: No evidence of dilated ducts.    Pancreas: No mass or peripancreatic fat stranding.    Spleen: Unremarkable.    Adrenals: Unremarkable.    Kidneys/  Ureters: No nephroureterolithiasis or hydronephrosis.    Bladder: No evidence of wall thickening.    Reproductive organs: Prosthetic calcifications are present.    GI Tract/Mesentery: Multiple scattered colonic diverticula are present.  Focal area of pericolonic inflammation and edema along involving a short segment of the descending colon.  No evidence of obstruction.  Appendix is normal.    Peritoneal Space: No ascites. No free air.    Retroperitoneum: No significant adenopathy.    Abdominal wall: Unremarkable.    Vasculature: Moderate calcific atherosclerosis of the abdominal aorta.  No aortic aneurysm.    Bones: No acute fracture.  Impression: 1. Colonic diverticulosis with focal pericolonic inflammation and edema involving a short segment of the descending colon, concerning for acute diverticulitis versus colitis.  No evidence of abscess formation or pneumoperitoneum.  2. Hepatomegaly and hepatic steatosis.  This report was flagged in Epic as abnormal.    Electronically signed by: Cliff Brandon  Date:    04/23/2020  Time:    14:28       Physical Exam    LOWER EXTREMITY PHYSICAL EXAMINATION  DERMATOLOGY:  Substantial xerosis to the plantar aspect of the right and left foot.  There is a 1 cm fissure present to the medial aspect of the right great toe which initiates at the proximal medial nail fold.  Upon removal of the hyperkeratotic tissue there is no underlying ulceration.  There is no malodor.  There is a slight erythematous discoloration to the skin surrounding the nails, however there is no signs of acute infection.  This does clarice with direct palpation.  No increase in warmth.  No open wounds.  The nails are thickened with medial to lateral longitudinal leukonychia.  There is no subungual debris.  No malodor.  No brittleness to the nails.       NEUROLOGY: Sensation to light touch is intact. Proprioception is intact.  Vibratory sensation intact    ORTHOPEDIC: Manual Muscle Testing is 5/5 in all planes on  the left and right, without pains, with and without resistance. Gait pattern is non-antalgic.    VASCULAR: Pulses are palpable to the B/L lower extremity. The right dorsalis pedis pulse is 2/4 and the posterior tibial pulse is 2/4. The left dorsalis pedis pulse is 2/4 and the posterior tibial pulse is 2/4. Hair growth is noted on the dorsal foot and digits. Proximal to distal, warm to warm. Capillary refill time is WNL at less than 3s.      Assessment:     1. Discoloration of skin of foot    2. Discoloration of nail    3. Xerosis cutis    4. Skin fissures        Plan:     Discoloration of skin of foot  -     Ambulatory referral/consult to Podiatry    Discoloration of nail  -     Ambulatory referral/consult to Podiatry    Xerosis cutis    Skin fissures      Thorough discussion is had with the patient this afternoon, concerning the diagnosis, its etiology, and the treatment algorithm at present.  Discussed treatment options regarding to xerosis and fissure of the right hallux.  Discussed the importance of soaking the feet daily in warm water with Epson salt or warm water.  Discouraged patient from utilizing vinegar which he previously was as this is a drying agent and would exacerbate the issue.  Discussed the importance of utilizing emollient cream barrier like Vaseline or Aquaphor after soaking as this is a hydrophobic barrier and will prevent water from a skating the skin.  Also discussed purchasing additional over-the-counter urea cream from [employer].  Discussed treatment options regarding medicated antifungals/antibacterial foot gels in conjunction with urea cream.  All options were provided to the patient patient states that he will continue utilizing over-the-counter urea cream in conjunction with foot soaks.  He does state that he will likely not be compliant with treatment options.  I do encourage patient to utilize socks when he is outside and not ambulate outside barefooted as this can lead to major  complications in foot and ankle pathology.    In regards to the discoloration of the skin and nails.  He states the nails are nonpainful and these do not cause him issues.  States he will manage these at home as this does not seem to be majority of his problem.  And discussion of the discoloration to the skin this could be associated with Buerger's syndrome given patient's history of smoking.  Otherwise, this is nonpainful for him and does not appear to be associated with infectious process.  Continue to monitor this area.    No future appointments.

## 2020-10-20 NOTE — PROGRESS NOTES
Health Maintenance Due   Topic Date Due    HIV Screening  09/30/1975    Shingles Vaccine (1 of 2) 09/30/2009    Influenza Vaccine (1) 08/01/2020     Updates were requested from care everywhere.  Chart was reviewed for overdue Proactive [facility] Encounters (FE) topics (CRS, Breast Cancer Screening, Eye exam)  Health Maintenance has been updated.  LINKS immunization registry triggered.  Immunizations were reconciled.

## 2020-10-26 ENCOUNTER — PATIENT MESSAGE (OUTPATIENT)
Dept: FAMILY MEDICINE | Facility: CLINIC | Age: 60
End: 2020-10-26

## 2020-10-27 ENCOUNTER — OFFICE VISIT (OUTPATIENT)
Dept: FAMILY MEDICINE | Facility: CLINIC | Age: 60
End: 2020-10-27
Payer: OTHER GOVERNMENT

## 2020-10-27 ENCOUNTER — TELEPHONE (OUTPATIENT)
Dept: FAMILY MEDICINE | Facility: CLINIC | Age: 60
End: 2020-10-27

## 2020-10-27 DIAGNOSIS — F33.1 MODERATE EPISODE OF RECURRENT MAJOR DEPRESSIVE DISORDER: ICD-10-CM

## 2020-10-27 DIAGNOSIS — F41.9 ANXIETY: Primary | ICD-10-CM

## 2020-10-27 PROCEDURE — 99213 OFFICE O/P EST LOW 20 MIN: CPT | Mod: 95,,, | Performed by: FAMILY MEDICINE

## 2020-10-27 PROCEDURE — 99213 PR OFFICE/OUTPT VISIT, EST, LEVL III, 20-29 MIN: ICD-10-PCS | Mod: 95,,, | Performed by: FAMILY MEDICINE

## 2020-10-27 RX ORDER — CLONAZEPAM 1 MG/1
1 TABLET ORAL DAILY PRN
Qty: 30 TABLET | Refills: 2 | Status: SHIPPED | OUTPATIENT
Start: 2020-10-27 | End: 2021-09-28 | Stop reason: ALTCHOICE

## 2020-10-27 RX ORDER — SERTRALINE HYDROCHLORIDE 100 MG/1
150 TABLET, FILM COATED ORAL DAILY
Qty: 45 TABLET | Refills: 2 | Status: SHIPPED | OUTPATIENT
Start: 2020-10-27 | End: 2021-01-14

## 2020-10-27 NOTE — Clinical Note
Referring patient to Psychiatry Dr. Tee Gordon at [facility].  Please make sure that he has contact information so that he can call them to make an appointment.  Also when I put in the referral Dr. Gordon's name did not show up in Epic so we may need to contact [facility] and have them put in the name so that we can redo referral as he is on

## 2020-10-27 NOTE — PROGRESS NOTES
Primary Care Telemedicine Note    The patient location is:  [place]  The chief complaint leading to consultation is: per below note  Total time spent with patient:  10 min      Visit type: Virtual visit with synchronous audio and video  Each patient to whom he or she provides medical services by telemedicine is:  (1) informed of the relationship between the physician and patient and the respective role of any other health care provider with respect to management of the patient; and (2) notified that he or she may decline to receive medical services by telemedicine and may withdraw from such care at any time.    Presents follow-up depression anxiety.    No SI/HI.  He had been on Lexapro, infrequent clonazepam as needed and Wellbutrin for a long time fairly stable, but noticed requiring more clonazepam for anxiety starting in July with essentially daily clonazepam use..  We switched Lexapro to duloxetine and maintained the Wellbutrin but this did not seem to help.  Last visit we changed the Lexapro to Zoloft..  His clonazepam was previously decreased as it was causing some drowsiness however smaller dose does appear to work as well.  .  He feels he may be getting some panic attacks...  Previously saw Psychiatry, but has not seen them in a couple of years.  No history of prior overuse with clonazepam as his prescription from 2017 had lasted 3 years until this July.    Stress related to school (working on advanced degree) and concerned about society in general.  He is on chronic opioids for chronic pain through pain management.    Diagnoses and all orders for this visit:    Anxiety  -     Ambulatory referral/consult to Psychiatry; Future    Mild episode of recurrent major depressive disorder  -     Ambulatory referral/consult to Psychiatry; Future    Other orders  -     clonazePAM (KLONOPIN) 1 MG tablet; Take 1 tablet (1 mg total) by mouth daily as needed for Anxiety.  -     sertraline (ZOLOFT) 100 MG tablet; Take  1.5 tablets (150 mg total) by mouth once daily.     increase Zoloft 150 mg daily.  Will go back to the clonazepam 1 mg daily as needed.  Continue Wellbutrin.  Refer to Psychiatry.  Follow-up with me in 1 month and as needed prior to this if he is not seeing the psychiatrist.       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    Chronic low back pain     Colon polyp     told to repeat 5 yrs    Coronary artery disease     no stents    Depression     Diverticulosis     ED (erectile dysfunction)     Fatty liver     Hepatomegaly     HTN (hypertension)     Hyperlipidemia     Myocardial infarct, old     Pulmonary nodule, left 2020    Repeat CT 2021    Sleep apnea      Past Surgical History:   Procedure Laterality Date    CARDIAC CATHETERIZATION      COLONOSCOPY  49 years old    colon polyps removed per patient report    COLONOSCOPY N/A 2020    Procedure: COLONOSCOPY;  Surgeon: Zachariah Dowell Jr., MD;  Location: [facility];  Service: Endoscopy;  Laterality: N/A;    INGUINAL HERNIA REPAIR Right 2011    VASECTOMY       Social History     Socioeconomic History    Marital status:      Spouse name: Not on file    Number of children: Not on file    Years of education: Not on file    Highest education level: Not on file   Occupational History    Not on file   Social Needs    Financial resource strain: Not on file    Food insecurity     Worry: Not on file     Inability: Not on file    Transportation needs     Medical: Not on file     Non-medical: Not on file   Tobacco Use    Smoking status: Former Smoker     Packs/day: 1.00     Years: 30.00     Pack years: 30.00     Quit date: 10/17/2007     Years since quittin.0    Smokeless tobacco: Never Used   Substance and Sexual Activity    Alcohol use: Yes      Alcohol/week: 0.0 - 1.0 standard drinks    Drug use: No    Sexual activity: Yes   Lifestyle    Physical activity     Days per week: Not on file     Minutes per session: Not on file    Stress: Not on file   Relationships    Social connections     Talks on phone: Not on file     Gets together: Not on file     Attends Protestant service: Not on file     Active member of club or organization: Not on file     Attends meetings of clubs or organizations: Not on file     Relationship status: Not on file   Other Topics Concern    Not on file   Social History Narrative    Not on file     Family History   Problem Relation Age of Onset    Lung cancer Father     Alzheimer's disease Mother     Heart attack Paternal Grandfather     Prostate cancer Brother     Colon polyps Brother     Hemochromatosis Brother     Hemochromatosis Brother     Colon cancer Neg Hx     Crohn's disease Neg Hx     Ulcerative colitis Neg Hx     Stomach cancer Neg Hx     Esophageal cancer Neg Hx      Review of patient's allergies indicates:  No Known Allergies  Current Outpatient Medications on File Prior to Visit   Medication Sig Dispense Refill    aspirin (ECOTRIN) 81 MG EC tablet Take 81 mg by mouth once daily.      atorvastatin (LIPITOR) 40 MG tablet TAKE 1 TABLET EVERY EVENING 90 tablet 3    buPROPion (WELLBUTRIN XL) 300 MG 24 hr tablet TAKE 1 TABLET DAILY 90 tablet 4    levomefolate-algal oil (DEPLIN, ALGAL OIL,) 7.5-90.314 mg Cap Take 1 capsule by mouth once daily. 90 capsule 3    lisinopril-hydrochlorothiazide (PRINZIDE,ZESTORETIC) 20-25 mg Tab Take 1 tablet by mouth once daily. 90 tablet 3    meloxicam (MOBIC) 7.5 MG tablet TAKE 1 TABLET DAILY 90 tablet 3    nitroGLYCERIN (NITROSTAT) 0.4 MG SL tablet Place 1 tablet (0.4 mg total) under the tongue every 5 (five) minutes as needed for Chest pain. Maximum 3 pills.  Don't take if using Viagra 25 tablet 5    omega-3 fatty acids/fish oil (FISH OIL-OMEGA-3 FATTY ACIDS) 300-1,000  mg capsule Take by mouth once daily.      oxycodone-acetaminophen (PERCOCET)  mg per tablet Take 1 tablet by mouth 4 (four) times daily.  0    sildenafil (VIAGRA) 100 MG tablet Take 1 tablet (100 mg total) by mouth daily as needed for Erectile Dysfunction (Don't take if using any nitroglycerin). 18 tablet 3    [DISCONTINUED] clonazePAM (KLONOPIN) 0.5 MG tablet Take 1.5 tablets (0.75 mg total) by mouth daily as needed for Anxiety. 45 tablet 1    [DISCONTINUED] DULoxetine (CYMBALTA) 30 MG capsule Take 1 capsule (30 mg total) by mouth once daily. For 7 days, then stop for 7 days 7 capsule 0    [DISCONTINUED] sertraline (ZOLOFT) 100 MG tablet Take 1 tablet (100 mg total) by mouth once daily. 30 tablet 1    [DISCONTINUED] sertraline (ZOLOFT) 25 MG tablet Take 1 daily for 1 week, then take 2 daily for 1 week, then start 100 mg pills 21 tablet 0     No current facility-administered medications on file prior to visit.      Answers for HPI/ROS submitted by the patient on 10/27/2020   activity change: No  unexpected weight change: No  neck pain: No  hearing loss: No  rhinorrhea: No  trouble swallowing: No  eye discharge: No  visual disturbance: No  chest tightness: No  wheezing: No  chest pain: No  palpitations: No  blood in stool: No  constipation: No  vomiting: No  diarrhea: No  polydipsia: No  polyuria: No  difficulty urinating: No  urgency: No  hematuria: No  joint swelling: No  arthralgias: No  headaches: No  weakness: No  confusion: No  dysphoric mood: Yes    There were no vitals filed for this visit.    Wt Readings from Last 3 Encounters:   10/20/20 (!) 139 kg (306 lb 7 oz)   06/01/20 136.1 kg (300 lb)   05/05/20 134.7 kg (297 lb)       APPEARANCE: Well nourished, well developed, in no acute distress.    MENTAL STATUS: Alert.  Oriented x 3.  Head atraumatic normocephalic no obvious sinus swelling  Eyes extraocular movements intact.  No obvious conjunctivitis  Neck supple  Chest no respiratory distress  noted.  No accessory muscle use.

## 2020-10-27 NOTE — TELEPHONE ENCOUNTER
MD SABINA Mcdowell. Staff             Referring patient to Psychiatry Dr. Tee Gordon at [facility].  Please make sure that he has contact information so that he can call them to make an appointment.  Also when I put in the referral Dr. Gordon's name did not show up in Epic so we may need to contact [facility] and have them put in the name so that we can redo referral as he is on

## 2020-11-02 ENCOUNTER — PATIENT MESSAGE (OUTPATIENT)
Dept: FAMILY MEDICINE | Facility: CLINIC | Age: 60
End: 2020-11-02

## 2020-11-02 ENCOUNTER — TELEPHONE (OUTPATIENT)
Dept: FAMILY MEDICINE | Facility: CLINIC | Age: 60
End: 2020-11-02

## 2020-11-06 ENCOUNTER — PATIENT MESSAGE (OUTPATIENT)
Dept: FAMILY MEDICINE | Facility: CLINIC | Age: 60
End: 2020-11-06

## 2020-11-06 ENCOUNTER — TELEPHONE (OUTPATIENT)
Dept: FAMILY MEDICINE | Facility: CLINIC | Age: 60
End: 2020-11-06

## 2020-11-06 NOTE — TELEPHONE ENCOUNTER
BP elevated when patient seen 4/23/2020, does he need any type of follow up for this, please advise.

## 2020-11-09 RX ORDER — MELOXICAM 7.5 MG/1
TABLET ORAL
Qty: 90 TABLET | Refills: 3 | Status: SHIPPED | OUTPATIENT
Start: 2020-11-09 | End: 2022-08-26

## 2020-11-09 RX ORDER — BUPROPION HYDROCHLORIDE 300 MG/1
TABLET ORAL
Qty: 90 TABLET | Refills: 1 | Status: SHIPPED | OUTPATIENT
Start: 2020-11-09 | End: 2021-07-19 | Stop reason: SDUPTHER

## 2020-11-09 RX ORDER — ESCITALOPRAM OXALATE 20 MG/1
TABLET ORAL
Qty: 90 TABLET | Refills: 3 | OUTPATIENT
Start: 2020-11-09

## 2020-11-17 ENCOUNTER — PATIENT OUTREACH (OUTPATIENT)
Dept: ADMINISTRATIVE | Facility: HOSPITAL | Age: 60
End: 2020-11-17

## 2020-11-17 NOTE — PROGRESS NOTES
HTN Report. Attempting to contact pt to obtain a home BP reading or schedule a nurse visit to recheck BP. Unable to reach patient at this time. Mailbox is full.

## 2020-12-21 RX ORDER — LISINOPRIL AND HYDROCHLOROTHIAZIDE 20; 25 MG/1; MG/1
TABLET ORAL
Qty: 90 TABLET | Refills: 0 | Status: SHIPPED | OUTPATIENT
Start: 2020-12-21 | End: 2021-03-22

## 2020-12-29 ENCOUNTER — TELEPHONE (OUTPATIENT)
Dept: FAMILY MEDICINE | Facility: CLINIC | Age: 60
End: 2020-12-29

## 2020-12-29 NOTE — TELEPHONE ENCOUNTER
Mailbox is full, unable to leave message, referral sent to preservice to contact [facility] for authorization

## 2020-12-29 NOTE — TELEPHONE ENCOUNTER
----- Message from Jyoti Forte sent at 12/29/2020 12:04 PM CST -----  Contact: Ivy LAZAR from [facility] called in regards to authorization for pt's appt 12/30/20. Please fax to [phone] & call back at [phone].

## 2020-12-29 NOTE — TELEPHONE ENCOUNTER
----- Message from [place] sent at 12/29/2020 12:06 PM CST -----  Contact: Gopi Nguyễn is calling because he needs an authorization in order to see Dr. Noe Lindsay for pain management tomorrow on 12/30. May you please give him a call back at [phone].    Thanks  KT

## 2020-12-29 NOTE — TELEPHONE ENCOUNTER
Per Hellen Whiteside, with preservice, pending with Daniel, unable to reach patient by phone to inform, Left message on voice mail to return call in am to see if referral authorized

## 2020-12-30 ENCOUNTER — TELEPHONE (OUTPATIENT)
Dept: FAMILY MEDICINE | Facility: CLINIC | Age: 60
End: 2020-12-30

## 2020-12-30 ENCOUNTER — PATIENT MESSAGE (OUTPATIENT)
Dept: FAMILY MEDICINE | Facility: CLINIC | Age: 60
End: 2020-12-30

## 2020-12-30 NOTE — TELEPHONE ENCOUNTER
Per Hellen, referral still pending, patient's voice mail still full, Information sent via [employer]ener

## 2020-12-30 NOTE — TELEPHONE ENCOUNTER
Patient informed Good morning, your pain mgmt referral from April fell out of the referral departments work que as nothing was shown as scheduled.  I contacted them yesterday but as of now, referral is still pending with .  Your VM is full.  You may want to contact  to hustle them so you don't have to reschedule.  Let me know.  Thank you

## 2020-12-30 NOTE — TELEPHONE ENCOUNTER
----- Message from Cassandra Muro sent at 12/30/2020  8:20 AM CST -----  .Type:  Patient Returning Call    Who Called:pt  Who Left Message for Patient:nurse  Does the patient know what this is regarding?:yes  Would the patient rather a call back or a response via [facility]? Call back  Best Call Back Number:[phone]  Additional Information:

## 2021-01-13 ENCOUNTER — PATIENT MESSAGE (OUTPATIENT)
Dept: FAMILY MEDICINE | Facility: CLINIC | Age: 61
End: 2021-01-13

## 2021-01-14 ENCOUNTER — PATIENT MESSAGE (OUTPATIENT)
Dept: FAMILY MEDICINE | Facility: CLINIC | Age: 61
End: 2021-01-14

## 2021-01-14 ENCOUNTER — OFFICE VISIT (OUTPATIENT)
Dept: FAMILY MEDICINE | Facility: CLINIC | Age: 61
End: 2021-01-14
Payer: OTHER GOVERNMENT

## 2021-01-14 ENCOUNTER — CLINICAL SUPPORT (OUTPATIENT)
Dept: FAMILY MEDICINE | Facility: CLINIC | Age: 61
End: 2021-01-14
Payer: OTHER GOVERNMENT

## 2021-01-14 DIAGNOSIS — M79.10 MUSCLE PAIN: ICD-10-CM

## 2021-01-14 DIAGNOSIS — Z20.822 CLOSE EXPOSURE TO COVID-19 VIRUS: ICD-10-CM

## 2021-01-14 DIAGNOSIS — R51.9 HEAD ACHE: ICD-10-CM

## 2021-01-14 DIAGNOSIS — F33.1 MODERATE EPISODE OF RECURRENT MAJOR DEPRESSIVE DISORDER: ICD-10-CM

## 2021-01-14 DIAGNOSIS — R50.9 FEVER: ICD-10-CM

## 2021-01-14 DIAGNOSIS — K08.89 TOOTHACHE: Primary | ICD-10-CM

## 2021-01-14 PROCEDURE — 99214 OFFICE O/P EST MOD 30 MIN: CPT | Mod: 95,,, | Performed by: FAMILY MEDICINE

## 2021-01-14 PROCEDURE — 99214 PR OFFICE/OUTPT VISIT, EST, LEVL IV, 30-39 MIN: ICD-10-PCS | Mod: 95,,, | Performed by: FAMILY MEDICINE

## 2021-01-14 PROCEDURE — U0003 INFECTIOUS AGENT DETECTION BY NUCLEIC ACID (DNA OR RNA); SEVERE ACUTE RESPIRATORY SYNDROME CORONAVIRUS 2 (SARS-COV-2) (CORONAVIRUS DISEASE [COVID-19]), AMPLIFIED PROBE TECHNIQUE, MAKING USE OF HIGH THROUGHPUT TECHNOLOGIES AS DESCRIBED BY CMS-2020-01-R: HCPCS

## 2021-01-14 RX ORDER — SERTRALINE HYDROCHLORIDE 100 MG/1
200 TABLET, FILM COATED ORAL DAILY
Qty: 180 TABLET | Refills: 1 | Status: SHIPPED | OUTPATIENT
Start: 2021-01-14 | End: 2021-02-25 | Stop reason: SDUPTHER

## 2021-01-14 RX ORDER — AMOXICILLIN 875 MG/1
875 TABLET, FILM COATED ORAL 2 TIMES DAILY
Qty: 20 TABLET | Refills: 0 | Status: SHIPPED | OUTPATIENT
Start: 2021-01-14 | End: 2021-01-24

## 2021-01-15 LAB — SARS-COV-2 RNA RESP QL NAA+PROBE: NOT DETECTED

## 2021-01-21 ENCOUNTER — PATIENT OUTREACH (OUTPATIENT)
Dept: ADMINISTRATIVE | Facility: HOSPITAL | Age: 61
End: 2021-01-21

## 2021-01-25 ENCOUNTER — PATIENT MESSAGE (OUTPATIENT)
Dept: FAMILY MEDICINE | Facility: CLINIC | Age: 61
End: 2021-01-25

## 2021-02-12 ENCOUNTER — TELEPHONE (OUTPATIENT)
Dept: FAMILY MEDICINE | Facility: CLINIC | Age: 61
End: 2021-02-12

## 2021-02-12 DIAGNOSIS — M54.50 CHRONIC BILATERAL LOW BACK PAIN WITHOUT SCIATICA: ICD-10-CM

## 2021-02-12 DIAGNOSIS — M54.2 NECK PAIN: Primary | ICD-10-CM

## 2021-02-12 DIAGNOSIS — G89.29 CHRONIC BILATERAL LOW BACK PAIN WITHOUT SCIATICA: ICD-10-CM

## 2021-02-23 ENCOUNTER — PATIENT MESSAGE (OUTPATIENT)
Dept: FAMILY MEDICINE | Facility: CLINIC | Age: 61
End: 2021-02-23

## 2021-02-23 DIAGNOSIS — Z12.5 SCREENING FOR PROSTATE CANCER: Primary | ICD-10-CM

## 2021-02-23 RX ORDER — ROSUVASTATIN CALCIUM 20 MG/1
20 TABLET, COATED ORAL DAILY
Qty: 90 TABLET | Refills: 3 | Status: SHIPPED | OUTPATIENT
Start: 2021-02-23 | End: 2021-09-28 | Stop reason: SDUPTHER

## 2021-02-25 RX ORDER — SERTRALINE HYDROCHLORIDE 100 MG/1
200 TABLET, FILM COATED ORAL DAILY
Qty: 180 TABLET | Refills: 1 | Status: SHIPPED | OUTPATIENT
Start: 2021-02-25 | End: 2022-03-29

## 2021-03-22 RX ORDER — LISINOPRIL AND HYDROCHLOROTHIAZIDE 20; 25 MG/1; MG/1
TABLET ORAL
Qty: 90 TABLET | Refills: 0 | Status: SHIPPED | OUTPATIENT
Start: 2021-03-22 | End: 2021-05-31

## 2021-03-24 ENCOUNTER — PATIENT MESSAGE (OUTPATIENT)
Dept: FAMILY MEDICINE | Facility: CLINIC | Age: 61
End: 2021-03-24

## 2021-05-21 ENCOUNTER — LAB VISIT (OUTPATIENT)
Dept: LAB | Facility: HOSPITAL | Age: 61
End: 2021-05-21
Attending: FAMILY MEDICINE
Payer: OTHER GOVERNMENT

## 2021-05-21 DIAGNOSIS — Z00.00 ROUTINE HISTORY AND PHYSICAL EXAMINATION OF ADULT: ICD-10-CM

## 2021-05-21 DIAGNOSIS — E78.5 HYPERLIPIDEMIA, UNSPECIFIED HYPERLIPIDEMIA TYPE: ICD-10-CM

## 2021-05-21 DIAGNOSIS — Z12.5 SCREENING FOR PROSTATE CANCER: ICD-10-CM

## 2021-05-21 DIAGNOSIS — I10 ESSENTIAL HYPERTENSION: ICD-10-CM

## 2021-05-21 LAB
ALBUMIN SERPL BCP-MCNC: 4.2 G/DL (ref 3.5–5.2)
ALP SERPL-CCNC: 72 U/L (ref 55–135)
ALT SERPL W/O P-5'-P-CCNC: 11 U/L (ref 10–44)
ANION GAP SERPL CALC-SCNC: 11 MMOL/L (ref 8–16)
AST SERPL-CCNC: 19 U/L (ref 10–40)
BILIRUB SERPL-MCNC: 1.4 MG/DL (ref 0.1–1)
BUN SERPL-MCNC: 14 MG/DL (ref 8–23)
CALCIUM SERPL-MCNC: 9.6 MG/DL (ref 8.7–10.5)
CHLORIDE SERPL-SCNC: 104 MMOL/L (ref 95–110)
CHOLEST SERPL-MCNC: 132 MG/DL (ref 120–199)
CHOLEST/HDLC SERPL: 2.9 {RATIO} (ref 2–5)
CO2 SERPL-SCNC: 26 MMOL/L (ref 23–29)
COMPLEXED PSA SERPL-MCNC: 0.47 NG/ML (ref 0–4)
CREAT SERPL-MCNC: 1.1 MG/DL (ref 0.5–1.4)
EST. GFR  (AFRICAN AMERICAN): >60 ML/MIN/1.73 M^2
EST. GFR  (NON AFRICAN AMERICAN): >60 ML/MIN/1.73 M^2
GLUCOSE SERPL-MCNC: 149 MG/DL (ref 70–110)
HDLC SERPL-MCNC: 46 MG/DL (ref 40–75)
HDLC SERPL: 34.8 % (ref 20–50)
LDLC SERPL CALC-MCNC: 59 MG/DL (ref 63–159)
NONHDLC SERPL-MCNC: 86 MG/DL
POTASSIUM SERPL-SCNC: 3.7 MMOL/L (ref 3.5–5.1)
PROT SERPL-MCNC: 7.1 G/DL (ref 6–8.4)
SODIUM SERPL-SCNC: 141 MMOL/L (ref 136–145)
TRIGL SERPL-MCNC: 135 MG/DL (ref 30–150)

## 2021-05-21 PROCEDURE — 80053 COMPREHEN METABOLIC PANEL: CPT | Performed by: FAMILY MEDICINE

## 2021-05-21 PROCEDURE — 36415 COLL VENOUS BLD VENIPUNCTURE: CPT | Mod: PO | Performed by: FAMILY MEDICINE

## 2021-05-21 PROCEDURE — 84153 ASSAY OF PSA TOTAL: CPT | Performed by: FAMILY MEDICINE

## 2021-05-21 PROCEDURE — 80061 LIPID PANEL: CPT | Performed by: FAMILY MEDICINE

## 2021-05-25 ENCOUNTER — TELEPHONE (OUTPATIENT)
Dept: FAMILY MEDICINE | Facility: CLINIC | Age: 61
End: 2021-05-25

## 2021-05-25 ENCOUNTER — PATIENT MESSAGE (OUTPATIENT)
Dept: FAMILY MEDICINE | Facility: CLINIC | Age: 61
End: 2021-05-25

## 2021-05-25 DIAGNOSIS — R17 ELEVATED BILIRUBIN: ICD-10-CM

## 2021-05-25 DIAGNOSIS — R73.9 ELEVATED BLOOD SUGAR LEVEL: Primary | ICD-10-CM

## 2021-05-31 RX ORDER — LISINOPRIL AND HYDROCHLOROTHIAZIDE 20; 25 MG/1; MG/1
TABLET ORAL
Qty: 90 TABLET | Refills: 0 | Status: SHIPPED | OUTPATIENT
Start: 2021-05-31 | End: 2021-08-29

## 2021-06-01 ENCOUNTER — LAB VISIT (OUTPATIENT)
Dept: LAB | Facility: HOSPITAL | Age: 61
End: 2021-06-01
Attending: FAMILY MEDICINE
Payer: OTHER GOVERNMENT

## 2021-06-01 DIAGNOSIS — R73.9 ELEVATED BLOOD SUGAR LEVEL: ICD-10-CM

## 2021-06-01 DIAGNOSIS — R17 ELEVATED BILIRUBIN: ICD-10-CM

## 2021-06-01 LAB
BASOPHILS # BLD AUTO: 0.06 K/UL (ref 0–0.2)
BASOPHILS NFR BLD: 0.9 % (ref 0–1.9)
DIFFERENTIAL METHOD: NORMAL
EOSINOPHIL # BLD AUTO: 0.2 K/UL (ref 0–0.5)
EOSINOPHIL NFR BLD: 2.7 % (ref 0–8)
ERYTHROCYTE [DISTWIDTH] IN BLOOD BY AUTOMATED COUNT: 12.8 % (ref 11.5–14.5)
ESTIMATED AVG GLUCOSE: 146 MG/DL (ref 68–131)
HBA1C MFR BLD: 6.7 % (ref 4–5.6)
HCT VFR BLD AUTO: 44.9 % (ref 40–54)
HGB BLD-MCNC: 14.7 G/DL (ref 14–18)
IMM GRANULOCYTES # BLD AUTO: 0.01 K/UL (ref 0–0.04)
IMM GRANULOCYTES NFR BLD AUTO: 0.1 % (ref 0–0.5)
LYMPHOCYTES # BLD AUTO: 2.2 K/UL (ref 1–4.8)
LYMPHOCYTES NFR BLD: 31.2 % (ref 18–48)
MCH RBC QN AUTO: 30.7 PG (ref 27–31)
MCHC RBC AUTO-ENTMCNC: 32.7 G/DL (ref 32–36)
MCV RBC AUTO: 94 FL (ref 82–98)
MONOCYTES # BLD AUTO: 0.5 K/UL (ref 0.3–1)
MONOCYTES NFR BLD: 7.1 % (ref 4–15)
NEUTROPHILS # BLD AUTO: 4.1 K/UL (ref 1.8–7.7)
NEUTROPHILS NFR BLD: 58 % (ref 38–73)
NRBC BLD-RTO: 0 /100 WBC
PLATELET # BLD AUTO: 259 K/UL (ref 150–450)
PMV BLD AUTO: 10.7 FL (ref 9.2–12.9)
RBC # BLD AUTO: 4.79 M/UL (ref 4.6–6.2)
RETICS/RBC NFR AUTO: 1.5 % (ref 0.4–2)
WBC # BLD AUTO: 7.03 K/UL (ref 3.9–12.7)

## 2021-06-01 PROCEDURE — 83036 HEMOGLOBIN GLYCOSYLATED A1C: CPT | Performed by: FAMILY MEDICINE

## 2021-06-01 PROCEDURE — 82947 ASSAY GLUCOSE BLOOD QUANT: CPT | Performed by: FAMILY MEDICINE

## 2021-06-01 PROCEDURE — 85025 COMPLETE CBC W/AUTO DIFF WBC: CPT | Performed by: FAMILY MEDICINE

## 2021-06-01 PROCEDURE — 85045 AUTOMATED RETICULOCYTE COUNT: CPT | Performed by: FAMILY MEDICINE

## 2021-06-01 PROCEDURE — 36415 COLL VENOUS BLD VENIPUNCTURE: CPT | Mod: PO | Performed by: FAMILY MEDICINE

## 2021-06-02 LAB — GLUCOSE SERPL-MCNC: 121 MG/DL (ref 70–110)

## 2021-06-03 ENCOUNTER — PATIENT MESSAGE (OUTPATIENT)
Dept: FAMILY MEDICINE | Facility: CLINIC | Age: 61
End: 2021-06-03

## 2021-06-08 ENCOUNTER — OFFICE VISIT (OUTPATIENT)
Dept: FAMILY MEDICINE | Facility: CLINIC | Age: 61
End: 2021-06-08
Payer: OTHER GOVERNMENT

## 2021-06-08 VITALS
BODY MASS INDEX: 37.15 KG/M2 | RESPIRATION RATE: 16 BRPM | TEMPERATURE: 97 F | DIASTOLIC BLOOD PRESSURE: 93 MMHG | HEIGHT: 75 IN | HEART RATE: 80 BPM | WEIGHT: 298.81 LBS | OXYGEN SATURATION: 97 % | SYSTOLIC BLOOD PRESSURE: 149 MMHG

## 2021-06-08 DIAGNOSIS — E78.5 HYPERLIPIDEMIA ASSOCIATED WITH TYPE 2 DIABETES MELLITUS: ICD-10-CM

## 2021-06-08 DIAGNOSIS — E11.69 HYPERLIPIDEMIA ASSOCIATED WITH TYPE 2 DIABETES MELLITUS: ICD-10-CM

## 2021-06-08 DIAGNOSIS — I25.2 MYOCARDIAL INFARCT, OLD: ICD-10-CM

## 2021-06-08 DIAGNOSIS — E11.59 HYPERTENSION ASSOCIATED WITH DIABETES: ICD-10-CM

## 2021-06-08 DIAGNOSIS — E11.9 TYPE 2 DIABETES MELLITUS WITHOUT COMPLICATION, WITHOUT LONG-TERM CURRENT USE OF INSULIN: Primary | ICD-10-CM

## 2021-06-08 DIAGNOSIS — I15.2 HYPERTENSION ASSOCIATED WITH DIABETES: ICD-10-CM

## 2021-06-08 DIAGNOSIS — E66.01 SEVERE OBESITY WITH BODY MASS INDEX (BMI) OF 35.0 TO 39.9 WITH COMORBIDITY: ICD-10-CM

## 2021-06-08 PROCEDURE — 90471 IMMUNIZATION ADMIN: CPT | Mod: PBBFAC,PO

## 2021-06-08 PROCEDURE — 99214 PR OFFICE/OUTPT VISIT, EST, LEVL IV, 30-39 MIN: ICD-10-PCS | Mod: S$PBB,,, | Performed by: FAMILY MEDICINE

## 2021-06-08 PROCEDURE — 99999 PR PBB SHADOW E&M-EST. PATIENT-LVL V: CPT | Mod: PBBFAC,,, | Performed by: FAMILY MEDICINE

## 2021-06-08 PROCEDURE — 99999 PR PBB SHADOW E&M-EST. PATIENT-LVL V: ICD-10-PCS | Mod: PBBFAC,,, | Performed by: FAMILY MEDICINE

## 2021-06-08 PROCEDURE — 99214 OFFICE O/P EST MOD 30 MIN: CPT | Mod: S$PBB,,, | Performed by: FAMILY MEDICINE

## 2021-06-08 PROCEDURE — 99215 OFFICE O/P EST HI 40 MIN: CPT | Mod: PBBFAC,PO | Performed by: FAMILY MEDICINE

## 2021-06-08 RX ORDER — DEXTROSE 4 G
TABLET,CHEWABLE ORAL
Qty: 1 EACH | Status: SHIPPED | OUTPATIENT
Start: 2021-06-08 | End: 2021-09-28 | Stop reason: ALTCHOICE

## 2021-06-08 RX ORDER — KETOCONAZOLE 20 MG/G
CREAM TOPICAL 2 TIMES DAILY
Qty: 60 G | Refills: 1 | Status: SHIPPED | OUTPATIENT
Start: 2021-06-08 | End: 2023-05-26 | Stop reason: SDUPTHER

## 2021-06-08 RX ORDER — AMLODIPINE BESYLATE 2.5 MG/1
2.5 TABLET ORAL DAILY
Qty: 30 TABLET | Refills: 1 | Status: SHIPPED | OUTPATIENT
Start: 2021-06-08 | End: 2021-07-02

## 2021-06-08 RX ORDER — LANCETS
EACH MISCELLANEOUS
Qty: 100 EACH | Status: SHIPPED | OUTPATIENT
Start: 2021-06-08

## 2021-06-08 RX ORDER — METFORMIN HYDROCHLORIDE 500 MG/1
500 TABLET, EXTENDED RELEASE ORAL DAILY
Qty: 90 TABLET | Refills: 3 | Status: SHIPPED | OUTPATIENT
Start: 2021-06-08 | End: 2022-04-07

## 2021-06-09 ENCOUNTER — LAB VISIT (OUTPATIENT)
Dept: LAB | Facility: HOSPITAL | Age: 61
End: 2021-06-09
Attending: FAMILY MEDICINE
Payer: OTHER GOVERNMENT

## 2021-06-09 ENCOUNTER — TELEPHONE (OUTPATIENT)
Dept: DIABETES | Facility: CLINIC | Age: 61
End: 2021-06-09

## 2021-06-09 DIAGNOSIS — E11.9 TYPE 2 DIABETES MELLITUS WITHOUT COMPLICATION, WITHOUT LONG-TERM CURRENT USE OF INSULIN: ICD-10-CM

## 2021-06-09 LAB
ALBUMIN/CREAT UR: 4.1 UG/MG (ref 0–30)
CREAT UR-MCNC: 320 MG/DL (ref 23–375)
MICROALBUMIN UR DL<=1MG/L-MCNC: 13 UG/ML

## 2021-06-09 PROCEDURE — 82043 UR ALBUMIN QUANTITATIVE: CPT | Performed by: FAMILY MEDICINE

## 2021-06-09 PROCEDURE — 82570 ASSAY OF URINE CREATININE: CPT | Performed by: FAMILY MEDICINE

## 2021-07-01 ENCOUNTER — TELEPHONE (OUTPATIENT)
Dept: CARDIOLOGY | Facility: CLINIC | Age: 61
End: 2021-07-01

## 2021-07-01 DIAGNOSIS — I15.2 HYPERTENSION ASSOCIATED WITH DIABETES: Primary | ICD-10-CM

## 2021-07-01 DIAGNOSIS — E11.59 HYPERTENSION ASSOCIATED WITH DIABETES: Primary | ICD-10-CM

## 2021-07-01 RX ORDER — INSULIN PUMP SYRINGE, 3 ML
EACH MISCELLANEOUS
Qty: 1 EACH | Refills: 11 | Status: SHIPPED | OUTPATIENT
Start: 2021-07-01 | End: 2023-05-26

## 2021-07-02 ENCOUNTER — HOSPITAL ENCOUNTER (OUTPATIENT)
Dept: CARDIOLOGY | Facility: HOSPITAL | Age: 61
Discharge: HOME | End: 2021-07-02
Attending: INTERNAL MEDICINE
Payer: OTHER GOVERNMENT

## 2021-07-02 ENCOUNTER — OFFICE VISIT (OUTPATIENT)
Dept: CARDIOLOGY | Facility: CLINIC | Age: 61
End: 2021-07-02
Payer: OTHER GOVERNMENT

## 2021-07-02 VITALS
DIASTOLIC BLOOD PRESSURE: 80 MMHG | HEIGHT: 75 IN | BODY MASS INDEX: 36.57 KG/M2 | WEIGHT: 294.13 LBS | HEART RATE: 80 BPM | SYSTOLIC BLOOD PRESSURE: 142 MMHG | OXYGEN SATURATION: 97 %

## 2021-07-02 DIAGNOSIS — I25.2 MYOCARDIAL INFARCT, OLD: Primary | ICD-10-CM

## 2021-07-02 DIAGNOSIS — I15.2 HYPERTENSION ASSOCIATED WITH DIABETES: ICD-10-CM

## 2021-07-02 DIAGNOSIS — E11.9 TYPE 2 DIABETES MELLITUS WITHOUT COMPLICATION, WITHOUT LONG-TERM CURRENT USE OF INSULIN: ICD-10-CM

## 2021-07-02 DIAGNOSIS — E11.59 HYPERTENSION ASSOCIATED WITH DIABETES: ICD-10-CM

## 2021-07-02 DIAGNOSIS — I25.10 MILD CAD: ICD-10-CM

## 2021-07-02 DIAGNOSIS — G47.33 OBSTRUCTIVE SLEEP APNEA SYNDROME: ICD-10-CM

## 2021-07-02 DIAGNOSIS — E11.69 HYPERLIPIDEMIA ASSOCIATED WITH TYPE 2 DIABETES MELLITUS: ICD-10-CM

## 2021-07-02 DIAGNOSIS — E78.5 HYPERLIPIDEMIA ASSOCIATED WITH TYPE 2 DIABETES MELLITUS: ICD-10-CM

## 2021-07-02 DIAGNOSIS — E66.01 SEVERE OBESITY WITH BODY MASS INDEX (BMI) OF 35.0 TO 39.9 WITH COMORBIDITY: ICD-10-CM

## 2021-07-02 PROCEDURE — 99999 PR PBB SHADOW E&M-EST. PATIENT-LVL IV: ICD-10-PCS | Mod: PBBFAC,,, | Performed by: INTERNAL MEDICINE

## 2021-07-02 PROCEDURE — 93010 EKG 12-LEAD: ICD-10-PCS | Mod: ,,, | Performed by: INTERNAL MEDICINE

## 2021-07-02 PROCEDURE — 93005 ELECTROCARDIOGRAM TRACING: CPT | Mod: PO

## 2021-07-02 PROCEDURE — 93010 ELECTROCARDIOGRAM REPORT: CPT | Mod: ,,, | Performed by: INTERNAL MEDICINE

## 2021-07-02 PROCEDURE — 99214 OFFICE O/P EST MOD 30 MIN: CPT | Mod: PBBFAC,PO | Performed by: INTERNAL MEDICINE

## 2021-07-02 PROCEDURE — 99214 PR OFFICE/OUTPT VISIT, EST, LEVL IV, 30-39 MIN: ICD-10-PCS | Mod: S$PBB,,, | Performed by: INTERNAL MEDICINE

## 2021-07-02 PROCEDURE — 99999 PR PBB SHADOW E&M-EST. PATIENT-LVL IV: CPT | Mod: PBBFAC,,, | Performed by: INTERNAL MEDICINE

## 2021-07-02 PROCEDURE — 99214 OFFICE O/P EST MOD 30 MIN: CPT | Mod: S$PBB,,, | Performed by: INTERNAL MEDICINE

## 2021-07-02 RX ORDER — AMLODIPINE BESYLATE 5 MG/1
5 TABLET ORAL DAILY
Qty: 90 TABLET | Refills: 3 | Status: SHIPPED | OUTPATIENT
Start: 2021-07-02 | End: 2022-05-02

## 2021-07-02 RX ORDER — NITROGLYCERIN 0.4 MG/1
0.4 TABLET SUBLINGUAL EVERY 5 MIN PRN
Qty: 25 TABLET | Refills: 5 | Status: SHIPPED | OUTPATIENT
Start: 2021-07-02 | End: 2022-09-13

## 2021-07-18 ENCOUNTER — PATIENT MESSAGE (OUTPATIENT)
Dept: FAMILY MEDICINE | Facility: CLINIC | Age: 61
End: 2021-07-18

## 2021-07-18 ENCOUNTER — PATIENT MESSAGE (OUTPATIENT)
Dept: SLEEP MEDICINE | Facility: CLINIC | Age: 61
End: 2021-07-18

## 2021-07-19 RX ORDER — BUPROPION HYDROCHLORIDE 300 MG/1
300 TABLET ORAL DAILY
Qty: 90 TABLET | Refills: 1 | Status: SHIPPED | OUTPATIENT
Start: 2021-07-19 | End: 2021-12-28

## 2021-07-21 ENCOUNTER — PATIENT OUTREACH (OUTPATIENT)
Dept: ADMINISTRATIVE | Facility: OTHER | Age: 61
End: 2021-07-21

## 2021-07-23 ENCOUNTER — TELEPHONE (OUTPATIENT)
Dept: FAMILY MEDICINE | Facility: CLINIC | Age: 61
End: 2021-07-23

## 2021-07-23 ENCOUNTER — PATIENT MESSAGE (OUTPATIENT)
Dept: FAMILY MEDICINE | Facility: CLINIC | Age: 61
End: 2021-07-23

## 2021-08-24 ENCOUNTER — HOSPITAL ENCOUNTER (OUTPATIENT)
Dept: CARDIOLOGY | Facility: HOSPITAL | Age: 61
Discharge: HOME | End: 2021-08-24
Attending: INTERNAL MEDICINE
Payer: OTHER GOVERNMENT

## 2021-08-24 VITALS
BODY MASS INDEX: 36.56 KG/M2 | DIASTOLIC BLOOD PRESSURE: 92 MMHG | HEIGHT: 75 IN | WEIGHT: 294 LBS | HEART RATE: 85 BPM | SYSTOLIC BLOOD PRESSURE: 150 MMHG

## 2021-08-24 DIAGNOSIS — E11.69 HYPERLIPIDEMIA ASSOCIATED WITH TYPE 2 DIABETES MELLITUS: ICD-10-CM

## 2021-08-24 DIAGNOSIS — E78.5 HYPERLIPIDEMIA ASSOCIATED WITH TYPE 2 DIABETES MELLITUS: ICD-10-CM

## 2021-08-24 DIAGNOSIS — E66.01 SEVERE OBESITY WITH BODY MASS INDEX (BMI) OF 35.0 TO 39.9 WITH COMORBIDITY: ICD-10-CM

## 2021-08-24 DIAGNOSIS — I25.10 MILD CAD: ICD-10-CM

## 2021-08-24 DIAGNOSIS — I25.2 MYOCARDIAL INFARCT, OLD: ICD-10-CM

## 2021-08-24 DIAGNOSIS — G47.33 OBSTRUCTIVE SLEEP APNEA SYNDROME: ICD-10-CM

## 2021-08-24 DIAGNOSIS — E11.59 HYPERTENSION ASSOCIATED WITH DIABETES: ICD-10-CM

## 2021-08-24 DIAGNOSIS — I15.2 HYPERTENSION ASSOCIATED WITH DIABETES: ICD-10-CM

## 2021-08-24 DIAGNOSIS — E11.9 TYPE 2 DIABETES MELLITUS WITHOUT COMPLICATION, WITHOUT LONG-TERM CURRENT USE OF INSULIN: ICD-10-CM

## 2021-08-24 LAB
AORTIC ROOT ANNULUS: 3.66 CM
ASCENDING AORTA: 3.33 CM
AV INDEX (PROSTH): 0.63
AV MEAN GRADIENT: 5 MMHG
AV PEAK GRADIENT: 8 MMHG
AV VALVE AREA: 2.73 CM2
AV VELOCITY RATIO: 0.65
BSA FOR ECHO PROCEDURE: 2.66 M2
CV ECHO LV RWT: 0.5 CM
CV STRESS BASE HR: 85 BPM
DIASTOLIC BLOOD PRESSURE: 92 MMHG
DOP CALC AO PEAK VEL: 1.41 M/S
DOP CALC AO VTI: 30.58 CM
DOP CALC LVOT AREA: 4.3 CM2
DOP CALC LVOT DIAMETER: 2.35 CM
DOP CALC LVOT PEAK VEL: 0.91 M/S
DOP CALC LVOT STROKE VOLUME: 83.58 CM3
DOP CALC RVOT PEAK VEL: 0.65 M/S
DOP CALC RVOT VTI: 13.98 CM
DOP CALCLVOT PEAK VEL VTI: 19.28 CM
E WAVE DECELERATION TIME: 338.63 MSEC
E/A RATIO: 0.64
E/E' RATIO: 4.3 M/S
ECHO LV POSTERIOR WALL: 1.3 CM (ref 0.6–1.1)
EJECTION FRACTION: 55 %
FRACTIONAL SHORTENING: 22 % (ref 28–44)
INTERVENTRICULAR SEPTUM: 1.3 CM (ref 0.6–1.1)
IVRT: 139.87 MSEC
LA MAJOR: 6.67 CM
LA MINOR: 6.38 CM
LA WIDTH: 3.32 CM
LEFT ATRIUM SIZE: 4.07 CM
LEFT ATRIUM VOLUME INDEX: 29 ML/M2
LEFT ATRIUM VOLUME: 74.91 CM3
LEFT INTERNAL DIMENSION IN SYSTOLE: 4.05 CM (ref 2.1–4)
LEFT VENTRICLE DIASTOLIC VOLUME INDEX: 49.83 ML/M2
LEFT VENTRICLE DIASTOLIC VOLUME: 128.55 ML
LEFT VENTRICLE MASS INDEX: 107 G/M2
LEFT VENTRICLE SYSTOLIC VOLUME INDEX: 27.9 ML/M2
LEFT VENTRICLE SYSTOLIC VOLUME: 72.09 ML
LEFT VENTRICULAR INTERNAL DIMENSION IN DIASTOLE: 5.18 CM (ref 3.5–6)
LEFT VENTRICULAR MASS: 276.76 G
LV LATERAL E/E' RATIO: 3.91 M/S
LV SEPTAL E/E' RATIO: 4.78 M/S
MV A" WAVE DURATION": 9.99 MSEC
MV PEAK A VEL: 0.67 M/S
MV PEAK E VEL: 0.43 M/S
MV STENOSIS PRESSURE HALF TIME: 98.2 MS
MV VALVE AREA P 1/2 METHOD: 2.24 CM2
OHS CV CPX 1 MINUTE RECOVERY HEART RATE: 121 BPM
OHS CV CPX 85 PERCENT MAX PREDICTED HEART RATE MALE: 135
OHS CV CPX ESTIMATED METS: 7
OHS CV CPX MAX PREDICTED HEART RATE: 159
OHS CV CPX PATIENT IS FEMALE: 0
OHS CV CPX PATIENT IS MALE: 1
OHS CV CPX PEAK DIASTOLIC BLOOD PRESSURE: 86 MMHG
OHS CV CPX PEAK HEAR RATE: 139 BPM
OHS CV CPX PEAK RATE PRESSURE PRODUCT: ABNORMAL
OHS CV CPX PEAK SYSTOLIC BLOOD PRESSURE: 207 MMHG
OHS CV CPX PERCENT MAX PREDICTED HEART RATE ACHIEVED: 87
OHS CV CPX RATE PRESSURE PRODUCT PRESENTING: ABNORMAL
PISA TR MAX VEL: 2.05 M/S
PULM VEIN S/D RATIO: 1.1
PV MEAN GRADIENT: 1 MMHG
PV PEAK D VEL: 0.52 M/S
PV PEAK S VEL: 0.57 M/S
PV PEAK VELOCITY: 0.8 CM/S
RA MAJOR: 6.25 CM
RA PRESSURE: 3 MMHG
RA WIDTH: 3.27 CM
RIGHT VENTRICULAR END-DIASTOLIC DIMENSION: 3.19 CM
SINUS: 3.03 CM
STJ: 3.14 CM
STRESS ANGINA INDEX: 0
STRESS ECHO POST EXERCISE DUR MIN: 4 MINUTES
STRESS ECHO POST EXERCISE DUR SEC: 47 SECONDS
SYSTOLIC BLOOD PRESSURE: 150 MMHG
TDI LATERAL: 0.11 M/S
TDI SEPTAL: 0.09 M/S
TDI: 0.1 M/S
TR MAX PG: 17 MMHG
TRICUSPID ANNULAR PLANE SYSTOLIC EXCURSION: 2.74 CM
TV REST PULMONARY ARTERY PRESSURE: 20 MMHG

## 2021-08-24 PROCEDURE — 93351 STRESS TTE COMPLETE: CPT | Mod: PO

## 2021-08-24 PROCEDURE — 93351 STRESS ECHO (CUPID ONLY): ICD-10-PCS | Mod: 26,,, | Performed by: INTERNAL MEDICINE

## 2021-08-24 PROCEDURE — 93351 STRESS TTE COMPLETE: CPT | Mod: 26,,, | Performed by: INTERNAL MEDICINE

## 2021-08-25 ENCOUNTER — TELEPHONE (OUTPATIENT)
Dept: CARDIOLOGY | Facility: CLINIC | Age: 61
End: 2021-08-25

## 2021-08-26 ENCOUNTER — TELEPHONE (OUTPATIENT)
Dept: CARDIOLOGY | Facility: CLINIC | Age: 61
End: 2021-08-26

## 2021-08-29 RX ORDER — LISINOPRIL AND HYDROCHLOROTHIAZIDE 20; 25 MG/1; MG/1
TABLET ORAL
Qty: 90 TABLET | Refills: 0 | Status: SHIPPED | OUTPATIENT
Start: 2021-08-29 | End: 2021-11-29

## 2021-09-08 ENCOUNTER — OFFICE VISIT (OUTPATIENT)
Dept: OPTOMETRY | Facility: CLINIC | Age: 61
End: 2021-09-08
Payer: OTHER GOVERNMENT

## 2021-09-08 DIAGNOSIS — H25.13 NUCLEAR SCLEROSIS OF BOTH EYES: ICD-10-CM

## 2021-09-08 DIAGNOSIS — E11.9 TYPE 2 DIABETES MELLITUS WITHOUT RETINOPATHY: Primary | ICD-10-CM

## 2021-09-08 DIAGNOSIS — H52.7 REFRACTIVE ERROR: ICD-10-CM

## 2021-09-08 PROCEDURE — 92004 PR EYE EXAM, NEW PATIENT,COMPREHESV: ICD-10-PCS | Mod: S$PBB,,, | Performed by: OPTOMETRIST

## 2021-09-08 PROCEDURE — 92004 COMPRE OPH EXAM NEW PT 1/>: CPT | Mod: S$PBB,,, | Performed by: OPTOMETRIST

## 2021-09-08 PROCEDURE — 99213 OFFICE O/P EST LOW 20 MIN: CPT | Mod: PBBFAC,PO | Performed by: OPTOMETRIST

## 2021-09-08 PROCEDURE — 92015 DETERMINE REFRACTIVE STATE: CPT | Mod: ,,, | Performed by: OPTOMETRIST

## 2021-09-08 PROCEDURE — 99999 PR PBB SHADOW E&M-EST. PATIENT-LVL III: CPT | Mod: PBBFAC,,, | Performed by: OPTOMETRIST

## 2021-09-08 PROCEDURE — 92015 PR REFRACTION: ICD-10-PCS | Mod: ,,, | Performed by: OPTOMETRIST

## 2021-09-08 PROCEDURE — 99999 PR PBB SHADOW E&M-EST. PATIENT-LVL III: ICD-10-PCS | Mod: PBBFAC,,, | Performed by: OPTOMETRIST

## 2021-09-14 ENCOUNTER — PATIENT MESSAGE (OUTPATIENT)
Dept: FAMILY MEDICINE | Facility: CLINIC | Age: 61
End: 2021-09-14

## 2021-09-16 ENCOUNTER — CLINICAL SUPPORT (OUTPATIENT)
Dept: DIABETES | Facility: CLINIC | Age: 61
End: 2021-09-16
Payer: OTHER GOVERNMENT

## 2021-09-16 DIAGNOSIS — E11.9 TYPE 2 DIABETES MELLITUS WITHOUT COMPLICATION, WITHOUT LONG-TERM CURRENT USE OF INSULIN: Primary | ICD-10-CM

## 2021-09-16 PROCEDURE — G0108 DIAB MANAGE TRN  PER INDIV: HCPCS | Mod: PBBFAC,PO | Performed by: DIETITIAN, REGISTERED

## 2021-09-22 ENCOUNTER — PATIENT MESSAGE (OUTPATIENT)
Dept: SLEEP MEDICINE | Facility: CLINIC | Age: 61
End: 2021-09-22

## 2021-09-27 ENCOUNTER — PATIENT OUTREACH (OUTPATIENT)
Dept: ADMINISTRATIVE | Facility: OTHER | Age: 61
End: 2021-09-27

## 2021-09-28 ENCOUNTER — OFFICE VISIT (OUTPATIENT)
Dept: CARDIOLOGY | Facility: CLINIC | Age: 61
End: 2021-09-28
Payer: OTHER GOVERNMENT

## 2021-09-28 DIAGNOSIS — E66.01 SEVERE OBESITY WITH BODY MASS INDEX (BMI) OF 35.0 TO 39.9 WITH COMORBIDITY: ICD-10-CM

## 2021-09-28 DIAGNOSIS — G47.33 OBSTRUCTIVE SLEEP APNEA SYNDROME: ICD-10-CM

## 2021-09-28 DIAGNOSIS — E78.5 HYPERLIPIDEMIA ASSOCIATED WITH TYPE 2 DIABETES MELLITUS: Primary | ICD-10-CM

## 2021-09-28 DIAGNOSIS — I25.10 MILD CAD: ICD-10-CM

## 2021-09-28 DIAGNOSIS — E11.69 HYPERLIPIDEMIA ASSOCIATED WITH TYPE 2 DIABETES MELLITUS: Primary | ICD-10-CM

## 2021-09-28 DIAGNOSIS — I25.2 MYOCARDIAL INFARCT, OLD: ICD-10-CM

## 2021-09-28 DIAGNOSIS — I15.2 HYPERTENSION ASSOCIATED WITH DIABETES: ICD-10-CM

## 2021-09-28 DIAGNOSIS — E11.59 HYPERTENSION ASSOCIATED WITH DIABETES: ICD-10-CM

## 2021-09-28 PROCEDURE — 99214 OFFICE O/P EST MOD 30 MIN: CPT | Mod: 95,,, | Performed by: INTERNAL MEDICINE

## 2021-09-28 PROCEDURE — 99214 PR OFFICE/OUTPT VISIT, EST, LEVL IV, 30-39 MIN: ICD-10-PCS | Mod: 95,,, | Performed by: INTERNAL MEDICINE

## 2021-09-28 RX ORDER — ROSUVASTATIN CALCIUM 20 MG/1
20 TABLET, COATED ORAL DAILY
Qty: 90 TABLET | Refills: 3 | Status: SHIPPED | OUTPATIENT
Start: 2021-09-28 | End: 2022-09-23

## 2021-11-30 ENCOUNTER — PATIENT MESSAGE (OUTPATIENT)
Dept: ADMINISTRATIVE | Facility: HOSPITAL | Age: 61
End: 2021-11-30
Payer: OTHER GOVERNMENT

## 2021-11-30 ENCOUNTER — PATIENT OUTREACH (OUTPATIENT)
Dept: ADMINISTRATIVE | Facility: HOSPITAL | Age: 61
End: 2021-11-30
Payer: OTHER GOVERNMENT

## 2021-12-09 ENCOUNTER — LAB VISIT (OUTPATIENT)
Dept: LAB | Facility: HOSPITAL | Age: 61
End: 2021-12-09
Attending: FAMILY MEDICINE
Payer: OTHER GOVERNMENT

## 2021-12-09 DIAGNOSIS — E11.9 TYPE 2 DIABETES MELLITUS WITHOUT COMPLICATION, WITHOUT LONG-TERM CURRENT USE OF INSULIN: ICD-10-CM

## 2021-12-09 LAB
ALBUMIN SERPL BCP-MCNC: 4 G/DL (ref 3.5–5.2)
ALP SERPL-CCNC: 63 U/L (ref 55–135)
ALT SERPL W/O P-5'-P-CCNC: 11 U/L (ref 10–44)
ANION GAP SERPL CALC-SCNC: 13 MMOL/L (ref 8–16)
AST SERPL-CCNC: 17 U/L (ref 10–40)
BILIRUB SERPL-MCNC: 1.4 MG/DL (ref 0.1–1)
BUN SERPL-MCNC: 9 MG/DL (ref 8–23)
CALCIUM SERPL-MCNC: 9.5 MG/DL (ref 8.7–10.5)
CHLORIDE SERPL-SCNC: 105 MMOL/L (ref 95–110)
CHOLEST SERPL-MCNC: 117 MG/DL (ref 120–199)
CHOLEST/HDLC SERPL: 2.7 {RATIO} (ref 2–5)
CO2 SERPL-SCNC: 22 MMOL/L (ref 23–29)
CREAT SERPL-MCNC: 1 MG/DL (ref 0.5–1.4)
EST. GFR  (AFRICAN AMERICAN): >60 ML/MIN/1.73 M^2
EST. GFR  (NON AFRICAN AMERICAN): >60 ML/MIN/1.73 M^2
ESTIMATED AVG GLUCOSE: 126 MG/DL (ref 68–131)
GLUCOSE SERPL-MCNC: 124 MG/DL (ref 70–110)
HBA1C MFR BLD: 6 % (ref 4–5.6)
HDLC SERPL-MCNC: 44 MG/DL (ref 40–75)
HDLC SERPL: 37.6 % (ref 20–50)
LDLC SERPL CALC-MCNC: 51.2 MG/DL (ref 63–159)
NONHDLC SERPL-MCNC: 73 MG/DL
POTASSIUM SERPL-SCNC: 3.9 MMOL/L (ref 3.5–5.1)
PROT SERPL-MCNC: 6.6 G/DL (ref 6–8.4)
SODIUM SERPL-SCNC: 140 MMOL/L (ref 136–145)
TRIGL SERPL-MCNC: 109 MG/DL (ref 30–150)

## 2021-12-09 PROCEDURE — 83036 HEMOGLOBIN GLYCOSYLATED A1C: CPT | Performed by: FAMILY MEDICINE

## 2021-12-09 PROCEDURE — 80053 COMPREHEN METABOLIC PANEL: CPT | Performed by: FAMILY MEDICINE

## 2021-12-09 PROCEDURE — 36415 COLL VENOUS BLD VENIPUNCTURE: CPT | Mod: PO | Performed by: FAMILY MEDICINE

## 2021-12-09 PROCEDURE — 80061 LIPID PANEL: CPT | Performed by: FAMILY MEDICINE

## 2021-12-13 ENCOUNTER — TELEPHONE (OUTPATIENT)
Dept: FAMILY MEDICINE | Facility: CLINIC | Age: 61
End: 2021-12-13
Payer: OTHER GOVERNMENT

## 2021-12-13 ENCOUNTER — PATIENT MESSAGE (OUTPATIENT)
Dept: FAMILY MEDICINE | Facility: CLINIC | Age: 61
End: 2021-12-13
Payer: OTHER GOVERNMENT

## 2021-12-13 DIAGNOSIS — Z79.899 ENCOUNTER FOR LONG-TERM (CURRENT) USE OF MEDICATIONS: Primary | ICD-10-CM

## 2021-12-13 DIAGNOSIS — Z12.5 SCREENING FOR PROSTATE CANCER: ICD-10-CM

## 2021-12-15 ENCOUNTER — TELEPHONE (OUTPATIENT)
Dept: FAMILY MEDICINE | Facility: CLINIC | Age: 61
End: 2021-12-15
Payer: OTHER GOVERNMENT

## 2021-12-21 ENCOUNTER — TELEPHONE (OUTPATIENT)
Dept: FAMILY MEDICINE | Facility: CLINIC | Age: 61
End: 2021-12-21
Payer: OTHER GOVERNMENT

## 2021-12-21 DIAGNOSIS — G89.29 CHRONIC LOW BACK PAIN, UNSPECIFIED BACK PAIN LATERALITY, UNSPECIFIED WHETHER SCIATICA PRESENT: Primary | ICD-10-CM

## 2021-12-21 DIAGNOSIS — M54.50 CHRONIC LOW BACK PAIN, UNSPECIFIED BACK PAIN LATERALITY, UNSPECIFIED WHETHER SCIATICA PRESENT: Primary | ICD-10-CM

## 2021-12-28 RX ORDER — BUPROPION HYDROCHLORIDE 300 MG/1
TABLET ORAL
Qty: 90 TABLET | Refills: 1 | Status: SHIPPED | OUTPATIENT
Start: 2021-12-28 | End: 2022-06-27

## 2021-12-29 ENCOUNTER — TELEPHONE (OUTPATIENT)
Dept: FAMILY MEDICINE | Facility: CLINIC | Age: 61
End: 2021-12-29
Payer: OTHER GOVERNMENT

## 2021-12-29 ENCOUNTER — TELEPHONE (OUTPATIENT)
Dept: FAMILY MEDICINE | Facility: CLINIC | Age: 61
End: 2021-12-29

## 2021-12-29 ENCOUNTER — CLINICAL SUPPORT (OUTPATIENT)
Dept: FAMILY MEDICINE | Facility: CLINIC | Age: 61
End: 2021-12-29
Payer: OTHER GOVERNMENT

## 2021-12-29 ENCOUNTER — PATIENT MESSAGE (OUTPATIENT)
Dept: FAMILY MEDICINE | Facility: CLINIC | Age: 61
End: 2021-12-29
Payer: OTHER GOVERNMENT

## 2021-12-29 DIAGNOSIS — Z20.822 EXPOSURE TO COVID-19 VIRUS: Primary | ICD-10-CM

## 2021-12-29 DIAGNOSIS — R05.9 COUGH: ICD-10-CM

## 2021-12-29 DIAGNOSIS — R05.9 COUGH: Primary | ICD-10-CM

## 2021-12-29 PROCEDURE — U0003 INFECTIOUS AGENT DETECTION BY NUCLEIC ACID (DNA OR RNA); SEVERE ACUTE RESPIRATORY SYNDROME CORONAVIRUS 2 (SARS-COV-2) (CORONAVIRUS DISEASE [COVID-19]), AMPLIFIED PROBE TECHNIQUE, MAKING USE OF HIGH THROUGHPUT TECHNOLOGIES AS DESCRIBED BY CMS-2020-01-R: HCPCS | Performed by: FAMILY MEDICINE

## 2021-12-29 PROCEDURE — U0005 INFEC AGEN DETEC AMPLI PROBE: HCPCS | Performed by: FAMILY MEDICINE

## 2021-12-31 LAB
SARS-COV-2 RNA RESP QL NAA+PROBE: NOT DETECTED
SARS-COV-2- CYCLE NUMBER: NORMAL

## 2022-01-07 ENCOUNTER — CLINICAL SUPPORT (OUTPATIENT)
Dept: FAMILY MEDICINE | Facility: CLINIC | Age: 62
End: 2022-01-07
Payer: OTHER GOVERNMENT

## 2022-01-07 DIAGNOSIS — Z23 IMMUNIZATION DUE: Primary | ICD-10-CM

## 2022-01-07 PROCEDURE — 90471 IMMUNIZATION ADMIN: CPT | Mod: PBBFAC,PO

## 2022-02-01 ENCOUNTER — PATIENT MESSAGE (OUTPATIENT)
Dept: FAMILY MEDICINE | Facility: CLINIC | Age: 62
End: 2022-02-01
Payer: OTHER GOVERNMENT

## 2022-02-01 DIAGNOSIS — R04.0 BLEEDING NOSE: Primary | ICD-10-CM

## 2022-02-25 RX ORDER — LISINOPRIL AND HYDROCHLOROTHIAZIDE 20; 25 MG/1; MG/1
TABLET ORAL
Qty: 90 TABLET | Refills: 0 | Status: SHIPPED | OUTPATIENT
Start: 2022-02-25 | End: 2022-05-26

## 2022-02-25 NOTE — TELEPHONE ENCOUNTER
No new care gaps identified.  Powered by [employer]. Reference number: [id number].   2/25/2022 1:42:28 AM CST

## 2022-02-28 ENCOUNTER — PATIENT MESSAGE (OUTPATIENT)
Dept: FAMILY MEDICINE | Facility: CLINIC | Age: 62
End: 2022-02-28
Payer: OTHER GOVERNMENT

## 2022-04-07 RX ORDER — METFORMIN HYDROCHLORIDE 500 MG/1
TABLET, EXTENDED RELEASE ORAL
Qty: 90 TABLET | Refills: 0 | Status: SHIPPED | OUTPATIENT
Start: 2022-04-07 | End: 2022-07-06

## 2022-04-07 NOTE — TELEPHONE ENCOUNTER
No new care gaps identified.  Powered by [employer]. Reference number: [id number].   4/07/2022 2:38:35 AM CDT

## 2022-04-08 NOTE — TELEPHONE ENCOUNTER
Refill Authorization Note   Gopi Garcia  is requesting a refill authorization.  Brief Assessment and Rationale for Refill:  Approve     Medication Therapy Plan:       Medication Reconciliation Completed: No   Comments:     No Care Gaps recommended.     Note composed:9:33 PM 04/07/2022

## 2022-04-27 ENCOUNTER — PATIENT MESSAGE (OUTPATIENT)
Dept: ADMINISTRATIVE | Facility: HOSPITAL | Age: 62
End: 2022-04-27
Payer: OTHER GOVERNMENT

## 2022-05-26 ENCOUNTER — PATIENT MESSAGE (OUTPATIENT)
Dept: FAMILY MEDICINE | Facility: CLINIC | Age: 62
End: 2022-05-26
Payer: OTHER GOVERNMENT

## 2022-05-26 RX ORDER — LISINOPRIL AND HYDROCHLOROTHIAZIDE 20; 25 MG/1; MG/1
TABLET ORAL
Qty: 90 TABLET | Refills: 0 | Status: SHIPPED | OUTPATIENT
Start: 2022-05-26 | End: 2022-08-24

## 2022-05-26 NOTE — TELEPHONE ENCOUNTER
No new care gaps identified.  [facility] Embedded Care Gaps. Reference number: [id number]. 5/26/2022   2:34:00 AM CDT

## 2022-05-26 NOTE — TELEPHONE ENCOUNTER
Refill Routing Note   Medication(s) are not appropriate for processing by [facility] for the following reason(s):      - Required vitals are abnormal    [facility] action(s):  Defer          Medication reconciliation completed: No     Appointments  past 12m or future 3m with PCP    Date Provider   Last Visit   6/8/2021 Paul Spencer MD   Next Visit   Visit date not found Paul Spencer MD   ED visits in past 90 days: 0        Note composed:9:33 AM 05/26/2022

## 2022-06-27 RX ORDER — BUPROPION HYDROCHLORIDE 300 MG/1
TABLET ORAL
Qty: 90 TABLET | Refills: 0 | Status: SHIPPED | OUTPATIENT
Start: 2022-06-27 | End: 2022-09-26

## 2022-06-27 NOTE — TELEPHONE ENCOUNTER
Care Due:                  Date            Visit Type   Department     Provider  --------------------------------------------------------------------------------                                EP -                              PRIMARY      [facility] FAMILY  Last Visit: 06-      CARE ([facility])   MEDICINE       Paul Spencer  Next Visit: None Scheduled  None         None Found                                                            Last  Test          Frequency    Reason                     Performed    Due Date  --------------------------------------------------------------------------------    Office Visit  12 months..  buPROPion,                 06- 06-                             lisinopriL-hydrochlorothi                             azide, metFORMIN,                             sertraline...............    HBA1C.......  6 months...  metFORMIN................  12- 06-    [facility] Embedded Care Gaps. Reference number: [id number]. 6/27/2022   2:46:20 AM CDT

## 2022-06-27 NOTE — TELEPHONE ENCOUNTER
Refill Routing Note   Medication(s) are not appropriate for processing by [facility] for the following reason(s):      - Required vitals are abnormal    [facility] action(s):  Defer          Medication reconciliation completed: No     Appointments  past 12m or future 3m with PCP    Date Provider   Last Visit   6/8/2021 Paul Spencer MD   Next Visit   Visit date not found Paul Spencer MD   ED visits in past 90 days: 0        Note composed:2:34 PM 06/27/2022

## 2022-07-06 RX ORDER — METFORMIN HYDROCHLORIDE 500 MG/1
TABLET, EXTENDED RELEASE ORAL
Qty: 90 TABLET | Refills: 0 | Status: SHIPPED | OUTPATIENT
Start: 2022-07-06 | End: 2022-10-04

## 2022-07-06 NOTE — TELEPHONE ENCOUNTER
Refill Routing Note   Medication(s) are not appropriate for processing by [facility] for the following reason(s):      - Required laboratory values are outdated    [facility] action(s):  Defer          Medication reconciliation completed: No     Appointments  past 12m or future 3m with PCP    Date Provider   Last Visit   6/8/2021 Paul Spencer MD   Next Visit   Visit date not found Paul Spencer MD   ED visits in past 90 days: 0        Note composed:10:30 AM 07/06/2022

## 2022-07-06 NOTE — TELEPHONE ENCOUNTER
No new care gaps identified.  [facility] Embedded Care Gaps. Reference number: [id number]. 7/06/2022   2:34:42 AM KTT

## 2022-07-08 ENCOUNTER — PATIENT MESSAGE (OUTPATIENT)
Dept: FAMILY MEDICINE | Facility: CLINIC | Age: 62
End: 2022-07-08
Payer: OTHER GOVERNMENT

## 2022-07-27 ENCOUNTER — PATIENT MESSAGE (OUTPATIENT)
Dept: ADMINISTRATIVE | Facility: HOSPITAL | Age: 62
End: 2022-07-27
Payer: OTHER GOVERNMENT

## 2022-08-24 RX ORDER — LISINOPRIL AND HYDROCHLOROTHIAZIDE 20; 25 MG/1; MG/1
TABLET ORAL
Qty: 90 TABLET | Refills: 0 | Status: SHIPPED | OUTPATIENT
Start: 2022-08-24 | End: 2022-11-22

## 2022-08-24 NOTE — TELEPHONE ENCOUNTER
Refill Routing Note   Medication(s) are not appropriate for processing by [facility] for the following reason(s):      - Required vitals are abnormal    [facility] action(s):  Defer          Medication reconciliation completed: No     Appointments  past 12m or future 3m with PCP    Date Provider   Last Visit   6/8/2021 Paul Spencer MD   Next Visit   Visit date not found Paul Spencer MD   ED visits in past 90 days: 0        Note composed:6:10 AM 08/24/2022

## 2022-08-25 ENCOUNTER — PATIENT MESSAGE (OUTPATIENT)
Dept: FAMILY MEDICINE | Facility: CLINIC | Age: 62
End: 2022-08-25
Payer: OTHER GOVERNMENT

## 2022-08-26 ENCOUNTER — OFFICE VISIT (OUTPATIENT)
Dept: FAMILY MEDICINE | Facility: CLINIC | Age: 62
End: 2022-08-26
Payer: OTHER GOVERNMENT

## 2022-08-26 ENCOUNTER — LAB VISIT (OUTPATIENT)
Dept: LAB | Facility: HOSPITAL | Age: 62
End: 2022-08-26
Attending: FAMILY MEDICINE
Payer: OTHER GOVERNMENT

## 2022-08-26 VITALS
HEIGHT: 75 IN | HEART RATE: 88 BPM | SYSTOLIC BLOOD PRESSURE: 137 MMHG | DIASTOLIC BLOOD PRESSURE: 89 MMHG | TEMPERATURE: 98 F | BODY MASS INDEX: 34.54 KG/M2 | WEIGHT: 277.81 LBS

## 2022-08-26 DIAGNOSIS — M54.50 CHRONIC BILATERAL LOW BACK PAIN WITHOUT SCIATICA: ICD-10-CM

## 2022-08-26 DIAGNOSIS — G47.00 INSOMNIA, UNSPECIFIED TYPE: ICD-10-CM

## 2022-08-26 DIAGNOSIS — M54.50 ACUTE RIGHT-SIDED LOW BACK PAIN WITHOUT SCIATICA: ICD-10-CM

## 2022-08-26 DIAGNOSIS — Z11.4 SCREENING FOR HIV (HUMAN IMMUNODEFICIENCY VIRUS): ICD-10-CM

## 2022-08-26 DIAGNOSIS — E78.5 HYPERLIPIDEMIA ASSOCIATED WITH TYPE 2 DIABETES MELLITUS: ICD-10-CM

## 2022-08-26 DIAGNOSIS — I25.10 MILD CAD: ICD-10-CM

## 2022-08-26 DIAGNOSIS — E11.59 HYPERTENSION ASSOCIATED WITH DIABETES: ICD-10-CM

## 2022-08-26 DIAGNOSIS — Z00.00 ROUTINE HISTORY AND PHYSICAL EXAMINATION OF ADULT: Primary | ICD-10-CM

## 2022-08-26 DIAGNOSIS — E11.69 HYPERLIPIDEMIA ASSOCIATED WITH TYPE 2 DIABETES MELLITUS: ICD-10-CM

## 2022-08-26 DIAGNOSIS — F17.200 SMOKING: ICD-10-CM

## 2022-08-26 DIAGNOSIS — Z12.5 SCREENING FOR PROSTATE CANCER: ICD-10-CM

## 2022-08-26 DIAGNOSIS — I25.2 MYOCARDIAL INFARCT, OLD: ICD-10-CM

## 2022-08-26 DIAGNOSIS — E11.9 TYPE 2 DIABETES MELLITUS WITHOUT COMPLICATION, WITHOUT LONG-TERM CURRENT USE OF INSULIN: ICD-10-CM

## 2022-08-26 DIAGNOSIS — Z12.2 ENCOUNTER FOR SCREENING FOR LUNG CANCER: ICD-10-CM

## 2022-08-26 DIAGNOSIS — I15.2 HYPERTENSION ASSOCIATED WITH DIABETES: ICD-10-CM

## 2022-08-26 DIAGNOSIS — G89.29 CHRONIC BILATERAL LOW BACK PAIN WITHOUT SCIATICA: ICD-10-CM

## 2022-08-26 DIAGNOSIS — Z14.8 CARRIER OF HEMOCHROMATOSIS HFE GENE MUTATION: ICD-10-CM

## 2022-08-26 DIAGNOSIS — G47.33 OBSTRUCTIVE SLEEP APNEA SYNDROME: ICD-10-CM

## 2022-08-26 DIAGNOSIS — Z00.00 ROUTINE HISTORY AND PHYSICAL EXAMINATION OF ADULT: ICD-10-CM

## 2022-08-26 LAB
BASOPHILS # BLD AUTO: 0.06 K/UL (ref 0–0.2)
BASOPHILS NFR BLD: 0.8 % (ref 0–1.9)
BILIRUB UR QL STRIP: NEGATIVE
CLARITY UR: CLEAR
COLOR UR: YELLOW
COMPLEXED PSA SERPL-MCNC: 0.6 NG/ML (ref 0–4)
DIFFERENTIAL METHOD: ABNORMAL
EOSINOPHIL # BLD AUTO: 0.2 K/UL (ref 0–0.5)
EOSINOPHIL NFR BLD: 3 % (ref 0–8)
ERYTHROCYTE [DISTWIDTH] IN BLOOD BY AUTOMATED COUNT: 12.2 % (ref 11.5–14.5)
ESTIMATED AVG GLUCOSE: 126 MG/DL (ref 68–131)
FERRITIN SERPL-MCNC: 128 NG/ML (ref 20–300)
GLUCOSE UR QL STRIP: NEGATIVE
HBA1C MFR BLD: 6 % (ref 4–5.6)
HCT VFR BLD AUTO: 47.6 % (ref 40–54)
HGB BLD-MCNC: 16.6 G/DL (ref 14–18)
HGB UR QL STRIP: NEGATIVE
IMM GRANULOCYTES # BLD AUTO: 0.02 K/UL (ref 0–0.04)
IMM GRANULOCYTES NFR BLD AUTO: 0.3 % (ref 0–0.5)
IRON SERPL-MCNC: 104 UG/DL (ref 45–160)
KETONES UR QL STRIP: NEGATIVE
LEUKOCYTE ESTERASE UR QL STRIP: NEGATIVE
LYMPHOCYTES # BLD AUTO: 2.4 K/UL (ref 1–4.8)
LYMPHOCYTES NFR BLD: 31.5 % (ref 18–48)
MCH RBC QN AUTO: 31.6 PG (ref 27–31)
MCHC RBC AUTO-ENTMCNC: 34.9 G/DL (ref 32–36)
MCV RBC AUTO: 91 FL (ref 82–98)
MONOCYTES # BLD AUTO: 0.6 K/UL (ref 0.3–1)
MONOCYTES NFR BLD: 7.6 % (ref 4–15)
NEUTROPHILS # BLD AUTO: 4.4 K/UL (ref 1.8–7.7)
NEUTROPHILS NFR BLD: 56.8 % (ref 38–73)
NITRITE UR QL STRIP: NEGATIVE
NRBC BLD-RTO: 0 /100 WBC
PH UR STRIP: 5.5 [PH] (ref 5–8)
PLATELET # BLD AUTO: 283 K/UL (ref 150–450)
PMV BLD AUTO: 10 FL (ref 9.2–12.9)
PROT UR QL STRIP: NEGATIVE
RBC # BLD AUTO: 5.25 M/UL (ref 4.6–6.2)
SATURATED IRON: 27 % (ref 20–50)
SP GR UR STRIP: 1.02 (ref 1–1.03)
TOTAL IRON BINDING CAPACITY: 386 UG/DL (ref 250–450)
TRANSFERRIN SERPL-MCNC: 261 MG/DL (ref 200–375)
URN SPEC COLLECT METH UR: NORMAL
WBC # BLD AUTO: 7.65 K/UL (ref 3.9–12.7)

## 2022-08-26 PROCEDURE — 99396 PR PREVENTIVE VISIT,EST,40-64: ICD-10-PCS | Mod: S$PBB,,, | Performed by: FAMILY MEDICINE

## 2022-08-26 PROCEDURE — 36415 COLL VENOUS BLD VENIPUNCTURE: CPT | Mod: PO | Performed by: FAMILY MEDICINE

## 2022-08-26 PROCEDURE — 82728 ASSAY OF FERRITIN: CPT | Performed by: FAMILY MEDICINE

## 2022-08-26 PROCEDURE — 84153 ASSAY OF PSA TOTAL: CPT | Performed by: FAMILY MEDICINE

## 2022-08-26 PROCEDURE — 99999 PR PBB SHADOW E&M-EST. PATIENT-LVL V: ICD-10-PCS | Mod: PBBFAC,,, | Performed by: FAMILY MEDICINE

## 2022-08-26 PROCEDURE — 90677 PCV20 VACCINE IM: CPT | Mod: PBBFAC,PO

## 2022-08-26 PROCEDURE — 90472 IMMUNIZATION ADMIN EACH ADD: CPT | Mod: PBBFAC,PO

## 2022-08-26 PROCEDURE — 99215 OFFICE O/P EST HI 40 MIN: CPT | Mod: PBBFAC,25,PO | Performed by: FAMILY MEDICINE

## 2022-08-26 PROCEDURE — 90750 HZV VACC RECOMBINANT IM: CPT | Mod: PBBFAC,PO

## 2022-08-26 PROCEDURE — 99999 PR PBB SHADOW E&M-EST. PATIENT-LVL V: CPT | Mod: PBBFAC,,, | Performed by: FAMILY MEDICINE

## 2022-08-26 PROCEDURE — 87389 HIV-1 AG W/HIV-1&-2 AB AG IA: CPT | Performed by: FAMILY MEDICINE

## 2022-08-26 PROCEDURE — 85025 COMPLETE CBC W/AUTO DIFF WBC: CPT | Performed by: FAMILY MEDICINE

## 2022-08-26 PROCEDURE — 84466 ASSAY OF TRANSFERRIN: CPT | Performed by: FAMILY MEDICINE

## 2022-08-26 PROCEDURE — 99396 PREV VISIT EST AGE 40-64: CPT | Mod: S$PBB,,, | Performed by: FAMILY MEDICINE

## 2022-08-26 PROCEDURE — 83036 HEMOGLOBIN GLYCOSYLATED A1C: CPT | Performed by: FAMILY MEDICINE

## 2022-08-26 PROCEDURE — 81003 URINALYSIS AUTO W/O SCOPE: CPT | Mod: PO | Performed by: FAMILY MEDICINE

## 2022-08-26 RX ORDER — NABUMETONE 500 MG/1
500 TABLET, FILM COATED ORAL 2 TIMES DAILY PRN
Qty: 60 TABLET | Refills: 0 | Status: SHIPPED | OUTPATIENT
Start: 2022-08-26 | End: 2023-04-18

## 2022-08-26 RX ORDER — MELATONIN 5 MG
CAPSULE ORAL
Refills: 0 | COMMUNITY
Start: 2022-08-26 | End: 2023-04-18

## 2022-08-26 NOTE — PROGRESS NOTES
Physical exam.  Hypertension controlled.  Diabetes controlled.  Hyperlipidemia on statin.  Due to see Cardiology soon due to previous mild coronary disease with myocardial infarction.  Prior hemochromatosis gene mutation carrier without disease noted.  He does smoke occasionally due for lung cancer screening.  Chronic low back pain on opioid therapy through pain management.  He did get some more cute lower back pain on the right mid to lower back over the past several days.  No injury.  Denies any urinary complaints.  He does note some insomnia.  Sleep apnea treated.    Gopi was seen today for back pain.    Diagnoses and all orders for this visit:    Routine history and physical examination of adult  -     Hemoglobin A1C; Future  -     PSA, Screening; Future  -     Ambulatory referral/consult to Cardiology; Future  -     HIV 1/2 Ag/Ab (4th Gen); Future  -     CBC Auto Differential; Future  -     Urinalysis, Reflex to Urine Culture Urine, Clean Catch; Future  -     Urinalysis, Reflex to Urine Culture Urine, Clean Catch    Hypertension associated with diabetes  -     Ambulatory referral/consult to Optometry; Future    Hyperlipidemia associated with type 2 diabetes mellitus    Myocardial infarct, old  -     Ambulatory referral/consult to Cardiology; Future    Mild CAD  -     Ambulatory referral/consult to Cardiology; Future    Type 2 diabetes mellitus without complication, without long-term current use of insulin  -     Hemoglobin A1C; Future    Carrier of hemochromatosis HFE gene mutation  -     CBC Auto Differential; Future  -     Ferritin; Future  -     Iron and TIBC; Future    Screening for prostate cancer  -     PSA, Screening; Future    Screening for HIV (human immunodeficiency virus)  -     HIV 1/2 Ag/Ab (4th Gen); Future    Encounter for screening for lung cancer  -     CT Chest Lung Screening Low Dose; Future    Smoking  -     CT Chest Lung Screening Low Dose; Future    Chronic bilateral low back pain  without sciatica  -     Urinalysis, Reflex to Urine Culture Urine, Clean Catch; Future  -     Urinalysis, Reflex to Urine Culture Urine, Clean Catch    Acute right-sided low back pain without sciatica  -     Urinalysis, Reflex to Urine Culture Urine, Clean Catch; Future  -     Urinalysis, Reflex to Urine Culture Urine, Clean Catch    Insomnia, unspecified type    Obstructive sleep apnea syndrome    Other orders  -     Pneumococcal Conjugate Vaccine (20 Valent) (IM); Future  -     Zoster Recombinant Vaccine  -     Zoster Recombinant Vaccine; Future  -     nabumetone (RELAFEN) 500 MG tablet; Take 1 tablet (500 mg total) by mouth 2 (two) times daily as needed for Pain.  -     melatonin 5 mg Cap; Use as directed on bottle nightly  -     Pneumococcal Conjugate Vaccine (20 Valent) (IM)  -     Zoster Recombinant Vaccine      Continue CPAP.  Handout given regarding insomnia.  Try melatonin.  Recommend follow-up with his sleep specialist if continued issues.  Continue follow-up with his pain management physician.    Anticipatory guidance: Don't smoke.  Healthy diet and regular exercise recommended.      Diabetes Management Status    Statin: Taking  ACE/ARB: Taking    Screening or Prevention Patient's value Goal Complete/Controlled?   HgA1C Testing and Control   Lab Results   Component Value Date    HGBA1C 6.0 (H) 12/09/2021      Annually/Less than 8% Yes   Lipid profile : 12/09/2021 Annually Yes   LDL control Lab Results   Component Value Date    LDLCALC 51.2 (L) 12/09/2021    Annually/Less than 100 mg/dl  Yes   Nephropathy screening Lab Results   Component Value Date    LABMICR 5.0 12/09/2021     Lab Results   Component Value Date    PROTEINUA Negative 08/26/2022    Annually Yes   Blood pressure BP Readings from Last 1 Encounters:   08/26/22 137/89    Less than 140/90 Yes   Dilated retinal exam : 09/08/2021 Annually Yes   Foot exam   : 08/26/2022 Annually Yes       Past Medical History:  Past Medical History:   Diagnosis  Date    Anxiety     AR (allergic rhinitis)     CAD (coronary artery disease)     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    Chronic low back pain     Colon polyp 2010    told to repeat 5 yrs    Coronary artery disease     no stents    Depression     Diverticulosis     ED (erectile dysfunction)     Fatty liver     Hepatomegaly     HTN (hypertension)     Hyperlipidemia     Kidney stone 2009    Myocardial infarct, old     Pulmonary nodule, left 01/02/2020    Repeat CT January 2021    Sleep apnea     Type 2 diabetes mellitus without complication, without long-term current use of insulin 06/08/2021     Past Surgical History:   Procedure Laterality Date    CARDIAC CATHETERIZATION  2006    COLONOSCOPY  49 years old    colon polyps removed per patient report    COLONOSCOPY N/A 6/2/2020    Procedure: COLONOSCOPY;  Surgeon: Zachariah Dowell Jr., MD;  [facility];  Service: Endoscopy;  Laterality: N/A;    INGUINAL HERNIA REPAIR Right 2011    VASECTOMY  2005     Review of patient's allergies indicates:  No Known Allergies  Current Outpatient Medications on File Prior to Visit   Medication Sig Dispense Refill    amLODIPine (NORVASC) 5 MG tablet TAKE 1 TABLET DAILY 90 tablet 3    aspirin (ECOTRIN) 81 MG EC tablet Take 81 mg by mouth once daily.      buPROPion (WELLBUTRIN XL) 300 MG 24 hr tablet TAKE 1 TABLET DAILY 90 tablet 0    lisinopriL-hydrochlorothiazide (PRINZIDE,ZESTORETIC) 20-25 mg Tab TAKE 1 TABLET DAILY 90 tablet 0    metFORMIN (GLUCOPHAGE-XR) 500 MG ER 24hr tablet TAKE 1 TABLET DAILY 90 tablet 0    nitroGLYCERIN (NITROSTAT) 0.4 MG SL tablet Place 1 tablet (0.4 mg total) under the tongue every 5 (five) minutes as needed for Chest pain. Maximum 3 pills.  Don't take if using Viagra 25 tablet 5    omega-3 fatty acids/fish oil (FISH OIL-OMEGA-3 FATTY ACIDS) 300-1,000 mg capsule Take by mouth once daily.      oxycodone-acetaminophen (PERCOCET)  mg per  tablet Take 1 tablet by mouth 4 (four) times daily.  0    rosuvastatin (CRESTOR) 20 MG tablet Take 1 tablet (20 mg total) by mouth once daily. 90 tablet 3    sertraline (ZOLOFT) 100 MG tablet TAKE 2 TABLETS ONCE DAILY 180 tablet 1    sildenafil (VIAGRA) 100 MG tablet Take 1 tablet (100 mg total) by mouth daily as needed for Erectile Dysfunction (Don't take if using any nitroglycerin). 18 tablet 3    [DISCONTINUED] meloxicam (MOBIC) 7.5 MG tablet TAKE 1 TABLET DAILY 90 tablet 3    blood sugar diagnostic Strp Test glucose 1 x daily 50 strip prn    blood-glucose meter (FREESTYLE FREEDOM LITE) kit Use as instructed 1 each 11    ketoconazole (NIZORAL) 2 % cream Apply topically 2 (two) times daily. 60 g 1    lancets Misc As directed 100 each prn     No current facility-administered medications on file prior to visit.     Social History     Socioeconomic History    Marital status:    Tobacco Use    Smoking status: Current Some Day Smoker     Packs/day: 1.00     Years: 30.00     Pack years: 30.00     Types: Cigarettes, Cigars     Last attempt to quit: 10/17/2007     Years since quittin.8    Smokeless tobacco: Never Used   Substance and Sexual Activity    Alcohol use: Yes     Alcohol/week: 0.0 - 1.0 standard drinks    Drug use: No    Sexual activity: Yes     Family History   Problem Relation Age of Onset    Lung cancer Father     Alzheimer's disease Mother     Heart attack Paternal Grandfather     Prostate cancer Brother     Colon polyps Brother     Hemochromatosis Brother     Hemochromatosis Brother     Colon cancer Neg Hx     Crohn's disease Neg Hx     Ulcerative colitis Neg Hx     Stomach cancer Neg Hx     Esophageal cancer Neg Hx     Glaucoma Neg Hx     Macular degeneration Neg Hx     Retinal detachment Neg Hx            ROS:  GENERAL: No fever, chills.  Has lost some weight.   CARDIOVASCULAR: Denies chest pain, PND, orthopnea.  ABDOMEN: Appetite fine. Denies diarrhea, abdominal  "pain, hematemesis or blood in stool.  URINARY: No flank pain, dysuria or hematuria.        OBJECTIVE:   Vitals:    08/26/22 1512   BP: 137/89   Pulse: 88   Temp: 97.9 °F (36.6 °C)   TempSrc: Temporal   Weight: 126 kg (277 lb 12.8 oz)   Height: 6' 3" (1.905 m)     Wt Readings from Last 3 Encounters:   08/26/22 126 kg (277 lb 12.8 oz)   08/24/21 133.4 kg (294 lb)   07/02/21 133.4 kg (294 lb 1.6 oz)       APPEARANCE: Well nourished, well developed, in no acute distress.   HEAD: Normocephalic. Atraumatic. No sinus tenderness.   EYES:   Right eye: Pupil reactive. Conjunctiva clear.   Left eye: Pupil reactive. Conjunctiva clear.   Both fundi: Grossly normal to nondilated exam. EOMI.   EARS: TM's intact. Light reflex normal. No retraction or perforation.   NOSE: clear.   MOUTH & THROAT: No pharyngeal erythema or exudate. No lesions.   NECK: No bruits. No JVD. No cervical lymphadenopathy. No thyromegaly.   CHEST: Breath sounds clear bilaterally. Normal respiratory effort   CARDIOVASCULAR: Normal rate. Regular rhythm. No murmurs. No rub. No gallops.   ABDOMEN: Bowel sounds normal. Soft. No tenderness. No organomegaly.   PERIPHERAL VASCULAR: No cyanosis. No clubbing. No edema.   NEUROLOGIC: No focal findings.    Feet:Sensation in the feet intact to monofilament testing.   No significant foot lesions.Pulses palpable.  MENTAL STATUS: Alert. Oriented x 3.   Back with decreased range of motion.  Mild tenderness palpation right lower thoracic upper lumbar paraspinous muscles.        "

## 2022-08-26 NOTE — PATIENT INSTRUCTIONS
Call [phone] to inquire about scheduling eye doctor appt in [place].  If Dr Coronel does not go to , we can schedule in [place]

## 2022-08-29 LAB — HIV 1+2 AB+HIV1 P24 AG SERPL QL IA: NEGATIVE

## 2022-09-04 ENCOUNTER — PATIENT MESSAGE (OUTPATIENT)
Dept: FAMILY MEDICINE | Facility: CLINIC | Age: 62
End: 2022-09-04
Payer: OTHER GOVERNMENT

## 2022-09-07 ENCOUNTER — HOSPITAL ENCOUNTER (OUTPATIENT)
Dept: RADIOLOGY | Facility: HOSPITAL | Age: 62
Discharge: HOME | End: 2022-09-07
Attending: FAMILY MEDICINE
Payer: OTHER GOVERNMENT

## 2022-09-07 DIAGNOSIS — F17.200 SMOKING: ICD-10-CM

## 2022-09-07 DIAGNOSIS — Z12.2 ENCOUNTER FOR SCREENING FOR LUNG CANCER: ICD-10-CM

## 2022-09-07 PROCEDURE — 71271 CT THORAX LUNG CANCER SCR C-: CPT | Mod: 26,,, | Performed by: RADIOLOGY

## 2022-09-07 PROCEDURE — 71271 CT CHEST LUNG SCREENING LOW DOSE: ICD-10-PCS | Mod: 26,,, | Performed by: RADIOLOGY

## 2022-09-07 PROCEDURE — 71271 CT THORAX LUNG CANCER SCR C-: CPT | Mod: TC,PO

## 2022-09-26 ENCOUNTER — PATIENT MESSAGE (OUTPATIENT)
Dept: FAMILY MEDICINE | Facility: CLINIC | Age: 62
End: 2022-09-26
Payer: OTHER GOVERNMENT

## 2022-09-26 DIAGNOSIS — R05.9 COUGH: Primary | ICD-10-CM

## 2022-09-26 DIAGNOSIS — R50.9 FEVER, UNSPECIFIED FEVER CAUSE: ICD-10-CM

## 2022-10-18 ENCOUNTER — PATIENT MESSAGE (OUTPATIENT)
Dept: ADMINISTRATIVE | Facility: HOSPITAL | Age: 62
End: 2022-10-18
Payer: OTHER GOVERNMENT

## 2022-11-14 ENCOUNTER — PATIENT MESSAGE (OUTPATIENT)
Dept: FAMILY MEDICINE | Facility: CLINIC | Age: 62
End: 2022-11-14
Payer: OTHER GOVERNMENT

## 2022-12-09 ENCOUNTER — LAB VISIT (OUTPATIENT)
Dept: LAB | Facility: HOSPITAL | Age: 62
End: 2022-12-09
Attending: FAMILY MEDICINE
Payer: OTHER GOVERNMENT

## 2022-12-09 DIAGNOSIS — Z12.5 SCREENING FOR PROSTATE CANCER: ICD-10-CM

## 2022-12-09 DIAGNOSIS — E11.9 TYPE 2 DIABETES MELLITUS WITHOUT COMPLICATION, WITHOUT LONG-TERM CURRENT USE OF INSULIN: ICD-10-CM

## 2022-12-09 DIAGNOSIS — Z79.899 ENCOUNTER FOR LONG-TERM (CURRENT) USE OF MEDICATIONS: ICD-10-CM

## 2022-12-09 LAB
ALBUMIN SERPL BCP-MCNC: 4 G/DL (ref 3.5–5.2)
ALP SERPL-CCNC: 60 U/L (ref 55–135)
ALT SERPL W/O P-5'-P-CCNC: 5 U/L (ref 10–44)
ANION GAP SERPL CALC-SCNC: 8 MMOL/L (ref 8–16)
AST SERPL-CCNC: 13 U/L (ref 10–40)
BILIRUB SERPL-MCNC: 1.1 MG/DL (ref 0.1–1)
BUN SERPL-MCNC: 10 MG/DL (ref 8–23)
CALCIUM SERPL-MCNC: 9 MG/DL (ref 8.7–10.5)
CHLORIDE SERPL-SCNC: 105 MMOL/L (ref 95–110)
CHOLEST SERPL-MCNC: 176 MG/DL (ref 120–199)
CHOLEST/HDLC SERPL: 3.5 {RATIO} (ref 2–5)
CO2 SERPL-SCNC: 28 MMOL/L (ref 23–29)
CREAT SERPL-MCNC: 0.9 MG/DL (ref 0.5–1.4)
EST. GFR  (NO RACE VARIABLE): >60 ML/MIN/1.73 M^2
ESTIMATED AVG GLUCOSE: 126 MG/DL (ref 68–131)
GLUCOSE SERPL-MCNC: 106 MG/DL (ref 70–110)
HBA1C MFR BLD: 6 % (ref 4–5.6)
HDLC SERPL-MCNC: 50 MG/DL (ref 40–75)
HDLC SERPL: 28.4 % (ref 20–50)
LDLC SERPL CALC-MCNC: 97.4 MG/DL (ref 63–159)
NONHDLC SERPL-MCNC: 126 MG/DL
POTASSIUM SERPL-SCNC: 3.8 MMOL/L (ref 3.5–5.1)
PROT SERPL-MCNC: 6.4 G/DL (ref 6–8.4)
SODIUM SERPL-SCNC: 141 MMOL/L (ref 136–145)
TRIGL SERPL-MCNC: 143 MG/DL (ref 30–150)
VIT B12 SERPL-MCNC: 222 PG/ML (ref 210–950)

## 2022-12-09 PROCEDURE — 36415 COLL VENOUS BLD VENIPUNCTURE: CPT | Mod: PO | Performed by: FAMILY MEDICINE

## 2022-12-09 PROCEDURE — 80053 COMPREHEN METABOLIC PANEL: CPT | Performed by: FAMILY MEDICINE

## 2022-12-09 PROCEDURE — 80061 LIPID PANEL: CPT | Performed by: FAMILY MEDICINE

## 2022-12-09 PROCEDURE — 83036 HEMOGLOBIN GLYCOSYLATED A1C: CPT | Performed by: FAMILY MEDICINE

## 2022-12-09 PROCEDURE — 82607 VITAMIN B-12: CPT | Performed by: FAMILY MEDICINE

## 2022-12-20 ENCOUNTER — PATIENT MESSAGE (OUTPATIENT)
Dept: FAMILY MEDICINE | Facility: CLINIC | Age: 62
End: 2022-12-20
Payer: OTHER GOVERNMENT

## 2022-12-29 ENCOUNTER — TELEPHONE (OUTPATIENT)
Dept: FAMILY MEDICINE | Facility: CLINIC | Age: 62
End: 2022-12-29
Payer: OTHER GOVERNMENT

## 2022-12-29 DIAGNOSIS — M54.2 CERVICALGIA: Primary | ICD-10-CM

## 2022-12-29 DIAGNOSIS — M47.892 OTHER SPONDYLOSIS, CERVICAL REGION: ICD-10-CM

## 2022-12-29 DIAGNOSIS — M47.23 OTHER SPONDYLOSIS WITH RADICULOPATHY, CERVICOTHORACIC REGION: ICD-10-CM

## 2022-12-29 DIAGNOSIS — M47.22 OTHER SPONDYLOSIS WITH RADICULOPATHY, CERVICAL REGION: ICD-10-CM

## 2022-12-29 DIAGNOSIS — M47.893 OTHER SPONDYLOSIS, CERVICOTHORACIC REGION: ICD-10-CM

## 2022-12-29 NOTE — TELEPHONE ENCOUNTER
Maria Elena asking for 7 follow up visits to be approved as patient had procedure done, informed her referral will be sent to Dr Spencer for signature

## 2022-12-29 NOTE — TELEPHONE ENCOUNTER
----- Message from Juliet Wallace sent at 12/29/2022  1:29 PM CST -----  Maria Elena [facility] is calling in regards to pt having  and need more visits approver for Dr Sohan Lindsay. Caller can be reached at [phone]

## 2023-02-16 NOTE — TELEPHONE ENCOUNTER
No new care gaps identified.  [facility] Embedded Care Gaps. Reference number: [id number]. 8/24/2022   2:35:29 AM CDT   Bcc Superficial Histology Text: Arising from the epidermis and superficial hair\\nfollicles are small, superficial, multicentric buds of basaloid keratinocytes. The cells have\\nscant cytoplasm and round dark nuclei. Mitotic figures and apoptotic bodies are\\nevident. The nuclei at the periphery of the islands have a palisaded arrangement. The\\nislands are associated with a fibromyxoid stroma and there is cleft formation between\\nsome of the islands and stroma.

## 2023-04-17 RX ORDER — METFORMIN HYDROCHLORIDE 500 MG/1
500 TABLET, EXTENDED RELEASE ORAL DAILY
Qty: 90 TABLET | Refills: 0 | Status: SHIPPED | OUTPATIENT
Start: 2023-04-17 | End: 2023-07-03

## 2023-04-17 NOTE — TELEPHONE ENCOUNTER
Care Due:                  Date            Visit Type   Department     Provider  --------------------------------------------------------------------------------                                EP -                              PRIMARY      [facility] FAM[facility]  Last Visit: 08-      CARE ([facility])   MEDICINE       Paul Spencer                              MYCHART                              FOLLOWUP/OF  [facility]  Next Visit: 04-      FICE VISIT   MEDICINE       Paul Spencer                                                            Last  Test          Frequency    Reason                     Performed    Due Date  --------------------------------------------------------------------------------    HBA1C.......  6 months...  metFORMIN................  12-   06-    [facility] Embedded Care Gaps. Reference number: [id number]. 4/17/2023   8:07:40 AM CDT

## 2023-04-18 ENCOUNTER — PATIENT MESSAGE (OUTPATIENT)
Dept: PULMONOLOGY | Facility: CLINIC | Age: 63
End: 2023-04-18
Payer: OTHER GOVERNMENT

## 2023-04-18 ENCOUNTER — OFFICE VISIT (OUTPATIENT)
Dept: FAMILY MEDICINE | Facility: CLINIC | Age: 63
End: 2023-04-18
Payer: OTHER GOVERNMENT

## 2023-04-18 VITALS
HEIGHT: 75 IN | TEMPERATURE: 98 F | SYSTOLIC BLOOD PRESSURE: 131 MMHG | HEART RATE: 68 BPM | DIASTOLIC BLOOD PRESSURE: 86 MMHG | WEIGHT: 277.63 LBS | BODY MASS INDEX: 34.52 KG/M2

## 2023-04-18 DIAGNOSIS — F33.0 MILD EPISODE OF RECURRENT MAJOR DEPRESSIVE DISORDER: ICD-10-CM

## 2023-04-18 DIAGNOSIS — F41.9 ANXIETY: ICD-10-CM

## 2023-04-18 DIAGNOSIS — G47.33 OBSTRUCTIVE SLEEP APNEA SYNDROME: ICD-10-CM

## 2023-04-18 DIAGNOSIS — G47.00 INSOMNIA, UNSPECIFIED TYPE: Primary | ICD-10-CM

## 2023-04-18 PROCEDURE — 99214 OFFICE O/P EST MOD 30 MIN: CPT | Mod: PBBFAC,PO | Performed by: FAMILY MEDICINE

## 2023-04-18 PROCEDURE — 99214 PR OFFICE/OUTPT VISIT, EST, LEVL IV, 30-39 MIN: ICD-10-PCS | Mod: S$PBB,,, | Performed by: FAMILY MEDICINE

## 2023-04-18 PROCEDURE — 99999 PR PBB SHADOW E&M-EST. PATIENT-LVL IV: CPT | Mod: PBBFAC,,, | Performed by: FAMILY MEDICINE

## 2023-04-18 PROCEDURE — 99214 OFFICE O/P EST MOD 30 MIN: CPT | Mod: S$PBB,,, | Performed by: FAMILY MEDICINE

## 2023-04-18 PROCEDURE — 99999 PR PBB SHADOW E&M-EST. PATIENT-LVL IV: ICD-10-PCS | Mod: PBBFAC,,, | Performed by: FAMILY MEDICINE

## 2023-04-18 RX ORDER — TRAZODONE HYDROCHLORIDE 50 MG/1
50 TABLET ORAL NIGHTLY PRN
Qty: 30 TABLET | Refills: 1 | Status: SHIPPED | OUTPATIENT
Start: 2023-04-18 | End: 2023-06-01

## 2023-04-18 RX ORDER — ESCITALOPRAM OXALATE 5 MG/1
5 TABLET ORAL DAILY
Qty: 30 TABLET | Refills: 1 | Status: SHIPPED | OUTPATIENT
Start: 2023-04-18 | End: 2023-05-26

## 2023-04-18 NOTE — PROGRESS NOTES
He complains of problems sleeping.  Falls asleep in about 20 or 30 minutes, then wakes up again after about 45 minutes and difficult to fall back asleep again.  He gets up and works on his computer.  He states he may go back to sleep around 4:00 a.m. but then has difficulty getting up again.  He does take a nap 1-1/2-2 hours per day.  He does have sleep apnea and states compliance with CPAP.  He did lose some weight in the past year.  Currently has used NyQuil with partial response.  CBD gummies did not help.  Melatonin did not help.  States he did use Ambien many years ago when he 1st got on CPAP which helped.  He is on chronic opioid therapy through pain management.  He does have some depression anxiety working on completing school for advanced degree.  He stopped taking his antidepressant medication.  No SI/HI.    Gopi was seen today for insomnia.    Diagnoses and all orders for this visit:    Insomnia, unspecified type  -     Ambulatory referral/consult to Sleep Disorders; Future  -     TSH; Future    Obstructive sleep apnea syndrome  -     Ambulatory referral/consult to Sleep Disorders; Future  -     TSH; Future    Anxiety  -     TSH; Future    Mild episode of recurrent major depressive disorder    Other orders  -     traZODone (DESYREL) 50 MG tablet; Take 1 tablet (50 mg total) by mouth nightly as needed for Insomnia.  -     EScitalopram oxalate (LEXAPRO) 5 MG Tab; Take 1 tablet (5 mg total) by mouth once daily.      Handout given regarding sleep hygiene.  Avoid long naps.  Avoid caffeine past noon, avoid exercise before bedtime.  Avoid screens at bedtime.  Will have him follow-up with Sleep Clinic and try medications as above.  Follow-up appointment 1 month.            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    Chronic low back pain     Colon  polyp 2010    told to repeat 5 yrs    Coronary artery disease     no stents    Depression     Diverticulosis     ED (erectile dysfunction)     Fatty liver     Hepatomegaly     HTN (hypertension)     Hyperlipidemia     Kidney stone 2009    Myocardial infarct, old     Pulmonary nodule, left 01/02/2020    Repeat CT January 2021    Sleep apnea     Type 2 diabetes mellitus without complication, without long-term current use of insulin 06/08/2021     Past Surgical History:   Procedure Laterality Date    CARDIAC CATHETERIZATION  2006    COLONOSCOPY  49 years old    colon polyps removed per patient report    COLONOSCOPY N/A 6/2/2020    Procedure: COLONOSCOPY;  Surgeon: Zachariah Dowell Jr., MD;  Location: [facility];  Service: Endoscopy;  Laterality: N/A;    INGUINAL HERNIA REPAIR Right 2011    VASECTOMY  2005     Review of patient's allergies indicates:  No Known Allergies  Current Outpatient Medications on File Prior to Visit   Medication Sig Dispense Refill    amLODIPine (NORVASC) 5 MG tablet TAKE 1 TABLET DAILY 90 tablet 3    aspirin (ECOTRIN) 81 MG EC tablet Take 81 mg by mouth once daily.      lisinopriL-hydrochlorothiazide (PRINZIDE,ZESTORETIC) 20-25 mg Tab TAKE 1 TABLET DAILY 90 tablet 1    metFORMIN (GLUCOPHAGE-XR) 500 MG ER 24hr tablet Take 1 tablet (500 mg total) by mouth once daily. 90 tablet 0    nitroGLYCERIN (NITROSTAT) 0.4 MG SL tablet DISSOLVE 1 TABLET UNDER THE TONGUE EVERY 5 MINUTES AS NEEDED FOR CHEST PAIN. MAXIMUM 3 TABLETS. DO NOT TAKE IF USING VIAGRA 25 tablet 11    omega-3 fatty acids/fish oil (FISH OIL-OMEGA-3 FATTY ACIDS) 300-1,000 mg capsule Take by mouth once daily.      oxycodone-acetaminophen (PERCOCET)  mg per tablet Take 1 tablet by mouth 4 (four) times daily.  0    rosuvastatin (CRESTOR) 20 MG tablet TAKE 1 TABLET DAILY 90 tablet 3    sildenafil (VIAGRA) 100 MG tablet Take 1 tablet (100 mg total) by mouth daily as needed for Erectile Dysfunction (Don't take if using any nitroglycerin).  18 tablet 3    [DISCONTINUED] buPROPion (WELLBUTRIN XL) 300 MG 24 hr tablet TAKE 1 TABLET DAILY 90 tablet 3    [DISCONTINUED] melatonin 5 mg Cap Use as directed on bottle nightly  0    [DISCONTINUED] sertraline (ZOLOFT) 100 MG tablet TAKE 2 TABLETS ONCE DAILY 180 tablet 1    blood sugar diagnostic Strp Test glucose 1 x daily 50 strip prn    blood-glucose meter (FREESTYLE FREEDOM LITE) kit Use as instructed 1 each 11    ketoconazole (NIZORAL) 2 % cream Apply topically 2 (two) times daily. 60 g 1    lancets Misc As directed 100 each prn    [DISCONTINUED] nabumetone (RELAFEN) 500 MG tablet Take 1 tablet (500 mg total) by mouth 2 (two) times daily as needed for Pain. 60 tablet 0     No current facility-administered medications on file prior to visit.     Social History     Socioeconomic History    Marital status:    Tobacco Use    Smoking status: Some Days     Packs/day: 1.00     Years: 30.00     Pack years: 30.00     Types: Cigarettes, Cigars     Last attempt to quit: 10/17/2007     Years since quitting: 15.5    Smokeless tobacco: Never   Substance and Sexual Activity    Alcohol use: Yes     Alcohol/week: 0.0 - 1.0 standard drinks    Drug use: No    Sexual activity: Yes     Family History   Problem Relation Age of Onset    Lung cancer Father     Alzheimer's disease Mother     Heart attack Paternal Grandfather     Prostate cancer Brother     Colon polyps Brother     Hemochromatosis Brother     Hemochromatosis Brother     Colon cancer Neg Hx     Crohn's disease Neg Hx     Ulcerative colitis Neg Hx     Stomach cancer Neg Hx     Esophageal cancer Neg Hx     Glaucoma Neg Hx     Macular degeneration Neg Hx     Retinal detachment Neg Hx            ROS:  GENERAL: No fever, chills,  or significant weight changes.   CARDIOVASCULAR: Denies chest pain, PND, orthopnea or reduced exercise tolerance.  ABDOMEN: Appetite fine. Denies diarrhea, abdominal pain, hematemesis or blood in stool.  URINARY: No flank pain, dysuria or  "hematuria.    Vitals:    04/18/23 1404   BP: 131/86   Pulse: 68   Temp: 97.7 °F (36.5 °C)   TempSrc: Temporal   Weight: 125.9 kg (277 lb 9.6 oz)   Height: 6' 3" (1.905 m)       Wt Readings from Last 3 Encounters:   04/18/23 125.9 kg (277 lb 9.6 oz)   08/26/22 126 kg (277 lb 12.8 oz)   08/24/21 133.4 kg (294 lb)       APPEARANCE: Well nourished, well developed, in no acute distress.    HEAD: Normocephalic.  Atraumatic.  EYES:   Right eye: Pupil reactive.  Conjunctiva clear.    Left eye: Pupil reactive.  Conjunctiva clear.    NECK: Supple. No bruits.  No JVD.  No cervical lymphadenopathy.  No thyromegaly.    CHEST: Breath sounds clear bilaterally.  Normal respiratory effort  CARDIOVASCULAR: Normal rate.  Regular rhythm.  No murmurs.  No rub.  No gallops.   No edema.  MENTAL STATUS: Alert.  Oriented x 3.  "

## 2023-04-19 ENCOUNTER — PATIENT MESSAGE (OUTPATIENT)
Dept: ADMINISTRATIVE | Facility: HOSPITAL | Age: 63
End: 2023-04-19
Payer: OTHER GOVERNMENT

## 2023-04-26 ENCOUNTER — PATIENT MESSAGE (OUTPATIENT)
Dept: FAMILY MEDICINE | Facility: CLINIC | Age: 63
End: 2023-04-26
Payer: OTHER GOVERNMENT

## 2023-04-26 ENCOUNTER — TELEPHONE (OUTPATIENT)
Dept: PULMONOLOGY | Facility: CLINIC | Age: 63
End: 2023-04-26
Payer: OTHER GOVERNMENT

## 2023-04-26 NOTE — TELEPHONE ENCOUNTER
Patient had a appointment scheduled this morning, 4/26/2023 at 9:00am. Patient logged in at 9:37 am. Staff called patient to inform him of the time. Patient began cussing and hollering at staff saying its [facility] system that's messed up. Patient cussed at staff and informed them to cancel any appointments he has with Pulmonary because he does not want any more issues. Staff offered to get manager for patient. Patient said I dont want to talk to them because the are going to hear me complain about yalls system. Patient hung up phone. Staff informed provider of this.

## 2023-05-22 RX ORDER — LISINOPRIL AND HYDROCHLOROTHIAZIDE 20; 25 MG/1; MG/1
1 TABLET ORAL DAILY
Qty: 90 TABLET | Refills: 1 | Status: SHIPPED | OUTPATIENT
Start: 2023-05-22 | End: 2023-11-17

## 2023-05-22 NOTE — TELEPHONE ENCOUNTER
Refill Decision Note   Gopi Bradshawilly  is requesting a refill authorization.  Brief Assessment and Rationale for Refill:  Approve     Medication Therapy Plan:       Medication Reconciliation Completed: No    Comments:     No Care Gaps recommended.     Note composed:12:40 PM 05/22/2023

## 2023-05-22 NOTE — TELEPHONE ENCOUNTER
No care due was identified.  [facility] Embedded Care Due Messages. Reference number: [id number].   5/22/2023 2:13:30 AM CDT

## 2023-05-26 ENCOUNTER — OFFICE VISIT (OUTPATIENT)
Dept: FAMILY MEDICINE | Facility: CLINIC | Age: 63
End: 2023-05-26
Payer: OTHER GOVERNMENT

## 2023-05-26 VITALS
WEIGHT: 279.81 LBS | HEIGHT: 75 IN | DIASTOLIC BLOOD PRESSURE: 76 MMHG | BODY MASS INDEX: 34.79 KG/M2 | SYSTOLIC BLOOD PRESSURE: 128 MMHG | HEART RATE: 73 BPM | TEMPERATURE: 98 F

## 2023-05-26 DIAGNOSIS — F41.9 ANXIETY: Primary | ICD-10-CM

## 2023-05-26 DIAGNOSIS — E11.9 TYPE 2 DIABETES MELLITUS WITHOUT COMPLICATION, WITHOUT LONG-TERM CURRENT USE OF INSULIN: ICD-10-CM

## 2023-05-26 DIAGNOSIS — G47.33 OBSTRUCTIVE SLEEP APNEA SYNDROME: ICD-10-CM

## 2023-05-26 DIAGNOSIS — F33.0 MILD EPISODE OF RECURRENT MAJOR DEPRESSIVE DISORDER: ICD-10-CM

## 2023-05-26 DIAGNOSIS — B35.1 ONYCHOMYCOSIS: ICD-10-CM

## 2023-05-26 PROCEDURE — 99214 PR OFFICE/OUTPT VISIT, EST, LEVL IV, 30-39 MIN: ICD-10-PCS | Mod: S$PBB,,, | Performed by: FAMILY MEDICINE

## 2023-05-26 PROCEDURE — 99215 OFFICE O/P EST HI 40 MIN: CPT | Mod: PBBFAC,PO | Performed by: FAMILY MEDICINE

## 2023-05-26 PROCEDURE — 99999 PR PBB SHADOW E&M-EST. PATIENT-LVL V: CPT | Mod: PBBFAC,,, | Performed by: FAMILY MEDICINE

## 2023-05-26 PROCEDURE — 99214 OFFICE O/P EST MOD 30 MIN: CPT | Mod: S$PBB,,, | Performed by: FAMILY MEDICINE

## 2023-05-26 PROCEDURE — 99999 PR PBB SHADOW E&M-EST. PATIENT-LVL V: ICD-10-PCS | Mod: PBBFAC,,, | Performed by: FAMILY MEDICINE

## 2023-05-26 RX ORDER — CICLOPIROX 80 MG/ML
SOLUTION TOPICAL NIGHTLY
Qty: 6.6 ML | Refills: 11 | Status: SHIPPED | OUTPATIENT
Start: 2023-05-26

## 2023-05-26 RX ORDER — ESCITALOPRAM OXALATE 10 MG/1
10 TABLET ORAL DAILY
Qty: 90 TABLET | Refills: 1 | Status: SHIPPED | OUTPATIENT
Start: 2023-05-26 | End: 2023-10-25

## 2023-05-26 RX ORDER — KETOCONAZOLE 20 MG/G
CREAM TOPICAL DAILY
Qty: 60 G | Refills: 1 | Status: SHIPPED | OUTPATIENT
Start: 2023-05-26 | End: 2023-06-25

## 2023-05-27 NOTE — PROGRESS NOTES
Patient presents for follow-up.  Anxiety depression better with Lexapro.  Trazodone helps sleep partially, but cause nasal congestion.  This interferes with his CPAP for sleep apnea.  Did not have sleep clinic appointment due to issues with video visit.  Does complain of onychomycosis toenails.  Has diabetes which has been controlled.    Diabetes Management Status    Statin: Taking  ACE/ARB: Taking    Screening or Prevention Patient's value Goal Complete/Controlled?   HgA1C Testing and Control   Lab Results   Component Value Date    HGBA1C 6.0 (H) 12/09/2022      Annually/Less than 8% Yes   Lipid profile : 12/09/2022 Annually Yes   LDL control Lab Results   Component Value Date    LDLCALC 97.4 12/09/2022    Annually/Less than 100 mg/dl  Yes   Nephropathy screening Lab Results   Component Value Date    LABMICR 13.0 12/09/2022     Lab Results   Component Value Date    PROTEINUA Negative 08/26/2022     No results found for: UTPCR   Annually Yes   Blood pressure BP Readings from Last 1 Encounters:   05/26/23 128/76    Less than 140/90 Yes   Dilated retinal exam : 09/08/2021 Annually No   Foot exam   : 08/26/2022 Annually Yes         Gopi was seen today for follow-up.    Diagnoses and all orders for this visit:    Anxiety  -     Ambulatory referral/consult to Sleep Disorders; Future    Obstructive sleep apnea syndrome  -     Ambulatory referral/consult to Sleep Disorders; Future    Mild episode of recurrent major depressive disorder  -     Ambulatory referral/consult to Sleep Disorders; Future    Onychomycosis    Type 2 diabetes mellitus without complication, without long-term current use of insulin  -     Ambulatory referral/consult to Optometry; Future    Other orders  -     EScitalopram oxalate (LEXAPRO) 10 MG tablet; Take 1 tablet (10 mg total) by mouth once daily.  -     ciclopirox (PENLAC) 8 % Soln; Apply topically nightly. Apply to adjacent skin and affected nails daily.  Remove with alcohol every 7 days.  -      ketoconazole (NIZORAL) 2 % cream; Apply topically once daily.    Keep feet dry.  Use socks.  Can use antifungal foot powder.  He will continue current medications for now.  Will refer to another sleep specialist.  Follow-up laboratory as already scheduled.          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    Chronic low back pain     Colon polyp 2010    told to repeat 5 yrs    Coronary artery disease     no stents    Depression     Diverticulosis     ED (erectile dysfunction)     Fatty liver     Hepatomegaly     HTN (hypertension)     Hyperlipidemia     Kidney stone 2009    Myocardial infarct, old     Pulmonary nodule, left 01/02/2020    Repeat CT January 2021    Sleep apnea     Type 2 diabetes mellitus without complication, without long-term current use of insulin 06/08/2021     Past Surgical History:   Procedure Laterality Date    CARDIAC CATHETERIZATION  2006    COLONOSCOPY  49 years old    colon polyps removed per patient report    COLONOSCOPY N/A 6/2/2020    Procedure: COLONOSCOPY;  Surgeon: Zachariah Dowell Jr., MD;  Location: [facility];  Service: Endoscopy;  Laterality: N/A;    INGUINAL HERNIA REPAIR Right 2011    VASECTOMY  2005     Review of patient's allergies indicates:  No Known Allergies  Current Outpatient Medications on File Prior to Visit   Medication Sig Dispense Refill    amLODIPine (NORVASC) 5 MG tablet TAKE 1 TABLET DAILY 30 tablet 1    aspirin (ECOTRIN) 81 MG EC tablet Take 81 mg by mouth once daily.      blood-glucose meter (FREESTYLE FREEDOM LITE) kit Use as instructed 1 each 11    lisinopriL-hydrochlorothiazide (PRINZIDE,ZESTORETIC) 20-25 mg Tab Take 1 tablet by mouth once daily. 90 tablet 1    metFORMIN (GLUCOPHAGE-XR) 500 MG ER 24hr tablet Take 1 tablet (500 mg total) by mouth once daily. 90 tablet 0    nitroGLYCERIN (NITROSTAT)  0.4 MG SL tablet DISSOLVE 1 TABLET UNDER THE TONGUE EVERY 5 MINUTES AS NEEDED FOR CHEST PAIN. MAXIMUM 3 TABLETS. DO NOT TAKE IF USING VIAGRA 25 tablet 11    omega-3 fatty acids/fish oil (FISH OIL-OMEGA-3 FATTY ACIDS) 300-1,000 mg capsule Take by mouth once daily.      oxycodone-acetaminophen (PERCOCET)  mg per tablet Take 1 tablet by mouth 4 (four) times daily.  0    rosuvastatin (CRESTOR) 20 MG tablet TAKE 1 TABLET DAILY 90 tablet 3    sildenafil (VIAGRA) 100 MG tablet Take 1 tablet (100 mg total) by mouth daily as needed for Erectile Dysfunction (Don't take if using any nitroglycerin). 18 tablet 3    traZODone (DESYREL) 50 MG tablet Take 1 tablet (50 mg total) by mouth nightly as needed for Insomnia. 30 tablet 1    [DISCONTINUED] EScitalopram oxalate (LEXAPRO) 5 MG Tab Take 1 tablet (5 mg total) by mouth once daily. 30 tablet 1    [DISCONTINUED] ketoconazole (NIZORAL) 2 % cream Apply topically 2 (two) times daily. 60 g 1    blood sugar diagnostic Strp Test glucose 1 x daily (Patient not taking: Reported on 5/26/2023) 50 strip prn    lancets Misc As directed (Patient not taking: Reported on 5/26/2023) 100 each prn     No current facility-administered medications on file prior to visit.     Social History     Socioeconomic History    Marital status:    Tobacco Use    Smoking status: Some Days     Packs/day: 1.00     Years: 30.00     Pack years: 30.00     Types: Cigarettes, Cigars     Last attempt to quit: 10/17/2007     Years since quitting: 15.6    Smokeless tobacco: Never   Substance and Sexual Activity    Alcohol use: Yes     Alcohol/week: 0.0 - 1.0 standard drinks    Drug use: No    Sexual activity: Yes     Family History   Problem Relation Age of Onset    Lung cancer Father     Alzheimer's disease Mother     Heart attack Paternal Grandfather     Prostate cancer Brother     Colon polyps Brother     Hemochromatosis Brother     Hemochromatosis Brother     Colon cancer Neg Hx     Crohn's disease Neg Hx  "    Ulcerative colitis Neg Hx     Stomach cancer Neg Hx     Esophageal cancer Neg Hx     Glaucoma Neg Hx     Macular degeneration Neg Hx     Retinal detachment Neg Hx            ROS:  GENERAL: No fever, chills,  or significant weight changes.   CARDIOVASCULAR: Denies chest pain, PND, orthopnea or reduced exercise tolerance.  ABDOMEN: Appetite fine. Denies diarrhea, abdominal pain, hematemesis or blood in stool.  URINARY: No flank pain, dysuria or hematuria.    Vitals:    05/26/23 0918   BP: 128/76   Pulse: 73   Temp: 98 °F (36.7 °C)   TempSrc: Temporal   Weight: 126.9 kg (279 lb 12.8 oz)   Height: 6' 3" (1.905 m)       Wt Readings from Last 3 Encounters:   05/26/23 126.9 kg (279 lb 12.8 oz)   04/18/23 125.9 kg (277 lb 9.6 oz)   08/26/22 126 kg (277 lb 12.8 oz)       APPEARANCE: Well nourished, well developed, in no acute distress.    HEAD: Normocephalic.  Atraumatic.  EYES:   Right eye: Pupil reactive.  Conjunctiva clear.    Left eye: Pupil reactive.  Conjunctiva clear.    NECK: Supple. No bruits.  No JVD.  No cervical lymphadenopathy.  No thyromegaly.    CHEST: Breath sounds clear bilaterally.  Normal respiratory effort  CARDIOVASCULAR: Normal rate.  Regular rhythm.  No murmurs.  No rub.  No gallops.   No edema.  MENTAL STATUS: Alert.  Oriented x 3.  He has thickening and yellowing of several toenails.  He has tinea pedis changes on the feet.  "

## 2023-06-01 ENCOUNTER — OFFICE VISIT (OUTPATIENT)
Dept: SLEEP MEDICINE | Facility: CLINIC | Age: 63
End: 2023-06-01
Payer: OTHER GOVERNMENT

## 2023-06-01 VITALS
DIASTOLIC BLOOD PRESSURE: 80 MMHG | RESPIRATION RATE: 18 BRPM | BODY MASS INDEX: 34.65 KG/M2 | SYSTOLIC BLOOD PRESSURE: 122 MMHG | WEIGHT: 278.69 LBS | HEIGHT: 75 IN | HEART RATE: 69 BPM | OXYGEN SATURATION: 97 %

## 2023-06-01 DIAGNOSIS — E11.69 HYPERLIPIDEMIA ASSOCIATED WITH TYPE 2 DIABETES MELLITUS: ICD-10-CM

## 2023-06-01 DIAGNOSIS — R91.1 PULMONARY NODULE, LEFT: ICD-10-CM

## 2023-06-01 DIAGNOSIS — E11.59 HYPERTENSION ASSOCIATED WITH DIABETES: ICD-10-CM

## 2023-06-01 DIAGNOSIS — G47.33 OBSTRUCTIVE SLEEP APNEA SYNDROME: Primary | ICD-10-CM

## 2023-06-01 DIAGNOSIS — F41.9 ANXIETY: ICD-10-CM

## 2023-06-01 DIAGNOSIS — E66.01 SEVERE OBESITY WITH BODY MASS INDEX (BMI) OF 35.0 TO 39.9 WITH COMORBIDITY: ICD-10-CM

## 2023-06-01 DIAGNOSIS — G47.33 OSA (OBSTRUCTIVE SLEEP APNEA): ICD-10-CM

## 2023-06-01 DIAGNOSIS — F51.02 INSOMNIA DUE TO PSYCHOLOGICAL STRESS: ICD-10-CM

## 2023-06-01 DIAGNOSIS — E11.9 TYPE 2 DIABETES MELLITUS WITHOUT COMPLICATION, WITHOUT LONG-TERM CURRENT USE OF INSULIN: ICD-10-CM

## 2023-06-01 DIAGNOSIS — I15.2 HYPERTENSION ASSOCIATED WITH DIABETES: ICD-10-CM

## 2023-06-01 DIAGNOSIS — E78.5 HYPERLIPIDEMIA ASSOCIATED WITH TYPE 2 DIABETES MELLITUS: ICD-10-CM

## 2023-06-01 DIAGNOSIS — F33.0 MILD EPISODE OF RECURRENT MAJOR DEPRESSIVE DISORDER: ICD-10-CM

## 2023-06-01 PROCEDURE — 99999 PR PBB SHADOW E&M-EST. PATIENT-LVL V: CPT | Mod: PBBFAC,,, | Performed by: INTERNAL MEDICINE

## 2023-06-01 PROCEDURE — 99999 PR PBB SHADOW E&M-EST. PATIENT-LVL V: ICD-10-PCS | Mod: PBBFAC,,, | Performed by: INTERNAL MEDICINE

## 2023-06-01 PROCEDURE — 99215 OFFICE O/P EST HI 40 MIN: CPT | Mod: PBBFAC | Performed by: INTERNAL MEDICINE

## 2023-06-01 PROCEDURE — 99203 PR OFFICE/OUTPT VISIT, NEW, LEVL III, 30-44 MIN: ICD-10-PCS | Mod: S$PBB,,, | Performed by: INTERNAL MEDICINE

## 2023-06-01 PROCEDURE — 99203 OFFICE O/P NEW LOW 30 MIN: CPT | Mod: S$PBB,,, | Performed by: INTERNAL MEDICINE

## 2023-06-01 RX ORDER — ZOLPIDEM TARTRATE 5 MG/1
5 TABLET ORAL NIGHTLY
Qty: 30 TABLET | Refills: 3 | Status: SHIPPED | OUTPATIENT
Start: 2023-06-01 | End: 2023-09-27 | Stop reason: SDUPTHER

## 2023-06-01 NOTE — PATIENT INSTRUCTIONS
Sleep Hygiene: The healthy habits of Good sleep    [email or web address]    1. Don't go to bed unless you are sleepy: If you are not sleepy at bedtime , then do something else. Read a book, listen to soft music or browse through a magazine. Find something relaxing , but not stimulating to take your mind off worries about sleep, this will relax your body and distract your mind.    2. If you are not asleep after 20 minutes, then get out of bed.  Find something else to do that will make you feel relaxed. If you can, do this in another room. Your bedroom should be where you go to sleep. It is not a place to go when you are bored. Once you feel sleepy again, go back to bed.    3. Begin rituals than help you relax each night before bed.  This can include such things like a warm bath, light snack or a few minutes of reading.    4. Get up at the same time every morning.  Do this even on weekends and holidays.    5. Get a full night's sleep on a regular basis.  Get enough sleep so that you feel well rested nearly every day.    6. Avoid taking naps if you can.  If you must take a nap, try to keep it short (less than one hour). Never take a nap after 3 pm.    7. Keep a regular schedule.  Regular times for meals, medications, chores and other activities help keep the inner body clock running smoothly.    8. Don't read, write, eat, watch TV, talk on the phone, play with electronics or play cards in bed.    9. Do not have any caffeine after lunch.    10. Do not have a beer, a glass of wine, or any other alcohol within six hours of your bedtime.    11. Do not have a cigarette or any other source of nicotine before bedtime.    12. Do not go to bed hungry, but don't eat a big meal near bedtime either.    13. Avoid any tough exercise within six hours of your bedtime.  You should exercise on a regular basis, but do it earlier in the day,(talk to your doctor before you begin an exercise program)    14.  Avoid sleeping pills, or use  them cautiously.  Most doctors do not prescribe sleeping pills for periods of more than hree weeks. Do not drink alcohol while taking sleeping pills.    15. Try to get rid of or deal with things that make you worry.  If you are unable to do this, then find a time during the day to get all your worries out of your system. Your bed is a place to rest, not a place to worry.    16. Make you bedroom quiet, dark and a little bit cool.  An easy way to remember this; it should remind you of a cave, while this may not sound romantic, it seems to work for bats. Bats are champion sleepers. They get about 16 hours of sleep each day. Maybe it's because they sleep in dark , cool caves.

## 2023-06-01 NOTE — PROGRESS NOTES
Pulmonary Outpatient  Visit     Subjective:       Patient ID: Gopi Garcia is a 62 y.o. male.    Social History     Tobacco Use   Smoking Status Some Days    Packs/day: 1.00    Years: 30.00    Pack years: 30.00    Types: Cigarettes, Cigars    Last attempt to quit: 10/17/2007    Years since quitting: 15.6   Smokeless Tobacco Never            Chief Complaint: Sleep Apnea      Gopi Garcia is 62 y.o.  Referred by Paul Spencer MD  Last seen 08/2019: DASHA on CPAP  Prior records reveiwed  Last LDCT: emphysematous changes  30 Pack year smoking Hx quit 2007  Smoke Cigars  [facility] PHD in education  Stress: trouble with sleep  Recently given Trazadone: caused nasal congestion  Asks for ambien worked well on past  CPAP data > 4 hrs was 96.75  AHI 1.3            The diagnostic polysomnography revealed a severe obstructive sleep apnea / hypopnea syndrome (A + H Index = 53.5 events  / hr asleep with 23.4 arousals / hr asleep for the study, and a mean SpO2 value of 92.7 %, moderate, minimum oxygen  saturation during sleep of 83.0 %, and waking baseline SpO2 = 97 %. There were no RERAs (respiratory effort- related  arousals). Sporadic, mild to moderately loud snoring was noted.  2. CPAP was initiated at 1:19 am. The titration polysomnography revealed that though 8 cm, 9 cm and 10 cm were each  completely tested and ineffective (AHI= 14.3, 14.4 and 21.9 respectively), and each had a high rate of central sleep apneas  predominating; Note too that central and mixed apneas did occur pre - CPAP, but did not predominate. The overall A + H  Index was 15.4 / hr asleep, with only 0.5 respiratory event - related arousals / hr asleep for the titration trial (there were no  RERAs). The mean SpO2 value was 94.2 % throughout the study, with a minimum oxygen saturation during sleep of 88.0 %  (waking baseline SpO2 was 96 %). Snoring was soft to absent at 10 cm, but was not  eliminated. The patient's own Xochitl ([employer] nasal CPAP mask was used and was well - tolerated. Please see PAP trial outcomes table, below.            Review of Systems    Outpatient Encounter Medications as of 6/1/2023   Medication Sig Dispense Refill    amLODIPine (NORVASC) 5 MG tablet TAKE 1 TABLET DAILY 30 tablet 1    aspirin (ECOTRIN) 81 MG EC tablet Take 81 mg by mouth once daily.      blood sugar diagnostic Strp Test glucose 1 x daily 50 strip prn    ciclopirox (PENLAC) 8 % Soln Apply topically nightly. Apply to adjacent skin and affected nails daily.  Remove with alcohol every 7 days. 6.6 mL 11    EScitalopram oxalate (LEXAPRO) 10 MG tablet Take 1 tablet (10 mg total) by mouth once daily. 90 tablet 1    ketoconazole (NIZORAL) 2 % cream Apply topically once daily. 60 g 1    lancets Misc As directed 100 each prn    lisinopriL-hydrochlorothiazide (PRINZIDE,ZESTORETIC) 20-25 mg Tab Take 1 tablet by mouth once daily. 90 tablet 1    metFORMIN (GLUCOPHAGE-XR) 500 MG ER 24hr tablet Take 1 tablet (500 mg total) by mouth once daily. 90 tablet 0    nitroGLYCERIN (NITROSTAT) 0.4 MG SL tablet DISSOLVE 1 TABLET UNDER THE TONGUE EVERY 5 MINUTES AS NEEDED FOR CHEST PAIN. MAXIMUM 3 TABLETS. DO NOT TAKE IF USING VIAGRA 25 tablet 11    omega-3 fatty acids/fish oil (FISH OIL-OMEGA-3 FATTY ACIDS) 300-1,000 mg capsule Take by mouth once daily.      oxycodone-acetaminophen (PERCOCET)  mg per tablet Take 1 tablet by mouth 4 (four) times daily.  0    rosuvastatin (CRESTOR) 20 MG tablet TAKE 1 TABLET DAILY 90 tablet 3    sildenafil (VIAGRA) 100 MG tablet Take 1 tablet (100 mg total) by mouth daily as needed for Erectile Dysfunction (Don't take if using any nitroglycerin). 18 tablet 3    [DISCONTINUED] traZODone (DESYREL) 50 MG tablet Take 1 tablet (50 mg total) by mouth nightly as needed for Insomnia. 30 tablet 1    blood-glucose meter (FREESTYLE FREEDOM LITE) kit Use as instructed 1 each 11    zolpidem (AMBIEN) 5 MG Tab Take 1  tablet (5 mg total) by mouth every evening. 30 tablet 3     No facility-administered encounter medications on file as of 6/1/2023.       The following portions of the patient's history were reviewed and updated as appropriate: He  has a past medical history of Anxiety, AR (allergic rhinitis), CAD (coronary artery disease), Carrier of hemochromatosis HFE gene mutation, Chronic low back pain, Colon polyp (2010), Coronary artery disease, Depression, Diverticulosis, ED (erectile dysfunction), Fatty liver, Hepatomegaly, HTN (hypertension), Hyperlipidemia, Kidney stone (2009), Myocardial infarct, old, Pulmonary nodule, left (01/02/2020), Sleep apnea, and Type 2 diabetes mellitus without complication, without long-term current use of insulin (06/08/2021).  He does not have any pertinent problems on file.  He  has a past surgical history that includes Inguinal hernia repair (Right, 2011); Vasectomy (2005); Cardiac catheterization (2006); Colonoscopy (49 years old); and Colonoscopy (N/A, 6/2/2020).  His family history includes Alzheimer's disease in his mother; Colon polyps in his brother; Heart attack in his paternal grandfather; Hemochromatosis in his brother and brother; Lung cancer in his father; Prostate cancer in his brother.  He  reports that he has been smoking cigarettes and cigars. He has a 30.00 pack-year smoking history. He has never used smokeless tobacco. He reports current alcohol use. He reports that he does not use drugs.  He has a current medication list which includes the following prescription(s): amlodipine, aspirin, blood sugar diagnostic, ciclopirox, escitalopram oxalate, ketoconazole, lancets, lisinopril-hydrochlorothiazide, metformin, nitroglycerin, fish oil-omega-3 fatty acids, oxycodone-acetaminophen, rosuvastatin, sildenafil, blood-glucose meter, and zolpidem.  Current Outpatient Medications on File Prior to Visit   Medication Sig Dispense Refill    amLODIPine (NORVASC) 5 MG tablet TAKE 1 TABLET  DAILY 30 tablet 1    aspirin (ECOTRIN) 81 MG EC tablet Take 81 mg by mouth once daily.      blood sugar diagnostic Strp Test glucose 1 x daily 50 strip prn    ciclopirox (PENLAC) 8 % Soln Apply topically nightly. Apply to adjacent skin and affected nails daily.  Remove with alcohol every 7 days. 6.6 mL 11    EScitalopram oxalate (LEXAPRO) 10 MG tablet Take 1 tablet (10 mg total) by mouth once daily. 90 tablet 1    ketoconazole (NIZORAL) 2 % cream Apply topically once daily. 60 g 1    lancets Misc As directed 100 each prn    lisinopriL-hydrochlorothiazide (PRINZIDE,ZESTORETIC) 20-25 mg Tab Take 1 tablet by mouth once daily. 90 tablet 1    metFORMIN (GLUCOPHAGE-XR) 500 MG ER 24hr tablet Take 1 tablet (500 mg total) by mouth once daily. 90 tablet 0    nitroGLYCERIN (NITROSTAT) 0.4 MG SL tablet DISSOLVE 1 TABLET UNDER THE TONGUE EVERY 5 MINUTES AS NEEDED FOR CHEST PAIN. MAXIMUM 3 TABLETS. DO NOT TAKE IF USING VIAGRA 25 tablet 11    omega-3 fatty acids/fish oil (FISH OIL-OMEGA-3 FATTY ACIDS) 300-1,000 mg capsule Take by mouth once daily.      oxycodone-acetaminophen (PERCOCET)  mg per tablet Take 1 tablet by mouth 4 (four) times daily.  0    rosuvastatin (CRESTOR) 20 MG tablet TAKE 1 TABLET DAILY 90 tablet 3    sildenafil (VIAGRA) 100 MG tablet Take 1 tablet (100 mg total) by mouth daily as needed for Erectile Dysfunction (Don't take if using any nitroglycerin). 18 tablet 3    [DISCONTINUED] traZODone (DESYREL) 50 MG tablet Take 1 tablet (50 mg total) by mouth nightly as needed for Insomnia. 30 tablet 1    blood-glucose meter (FREESTYLE FREEDOM LITE) kit Use as instructed 1 each 11     No current facility-administered medications on file prior to visit.     He has No Known Allergies..      BP Readings from Last 3 Encounters:   06/01/23 122/80   05/26/23 128/76   04/18/23 131/86     Snoring / Sleep:          MMRC Dyspnea Scale (4 is worst)     [] MMRC 0: Dyspneic on strenuous excercise (0 points)    [] MMRC 1:  "Dyspneic on walking a slight hill (0 points)    [] MMRC 2: Dyspneic on walking level ground; must stop occasionally due to breathlessness (1 point)    [] MMRC 3: Must stop for breathlessness after walking 100 yards or after a few minutes (2 points)    [] MMRC 4: Cannot leave house; breathless on dressing/undressing (3 points)                    EPWORTH SLEEPINESS SCALE 6/1/2023   Sitting and reading 0   Watching TV 0   Sitting, inactive in a public place (e.g. a theatre or a meeting) 0   As a passenger in a car for an hour without a break 0   Lying down to rest in the afternoon when circumstances permit 0   Sitting and talking to someone 0   Sitting quietly after a lunch without alcohol 0   In a car, while stopped for a few minutes in traffic 0   Total score 0            Objective:     Vital Signs (Most Recent)  Vital Signs  Pulse: 69  Resp: 18  SpO2: 97 %  BP: 122/80  Height and Weight  Height: 6' 3" (190.5 cm)  Weight: 126.4 kg (278 lb 10.6 oz)  BSA (Calculated - sq m): 2.59 sq meters  BMI (Calculated): 34.8  Weight in (lb) to have BMI = 25: 199.6]  Wt Readings from Last 2 Encounters:   06/01/23 126.4 kg (278 lb 10.6 oz)   05/26/23 126.9 kg (279 lb 12.8 oz)       Physical Exam  Vitals and nursing note reviewed.   Constitutional:       Appearance: Normal appearance.   HENT:      Head: Normocephalic and atraumatic.      Right Ear: Tympanic membrane normal.      Nose: Nose normal.   Eyes:      Pupils: Pupils are equal, round, and reactive to light.   Cardiovascular:      Rate and Rhythm: Normal rate and regular rhythm.      Pulses: Normal pulses.      Heart sounds: Normal heart sounds.   Pulmonary:      Effort: Pulmonary effort is normal.      Breath sounds: Normal breath sounds.   Abdominal:      General: Bowel sounds are normal.      Palpations: Abdomen is soft.   Musculoskeletal:         General: Normal range of motion.      Cervical back: Normal range of motion.   Skin:     General: Skin is warm and dry.      " Capillary Refill: Capillary refill takes less than 2 seconds.   Neurological:      General: No focal deficit present.      Mental Status: He is alert and oriented to person, place, and time.   Psychiatric:         Mood and Affect: Mood normal.        Laboratory  Lab Results   Component Value Date    WBC 7.65 08/26/2022    RBC 5.25 08/26/2022    HGB 16.6 08/26/2022    HCT 47.6 08/26/2022    MCV 91 08/26/2022    MCH 31.6 (H) 08/26/2022    MCHC 34.9 08/26/2022    RDW 12.2 08/26/2022     08/26/2022    MPV 10.0 08/26/2022    GRAN 4.4 08/26/2022    GRAN 56.8 08/26/2022    LYMPH 2.4 08/26/2022    LYMPH 31.5 08/26/2022    MONO 0.6 08/26/2022    MONO 7.6 08/26/2022    EOS 0.2 08/26/2022    BASO 0.06 08/26/2022    EOSINOPHIL 3.0 08/26/2022    BASOPHIL 0.8 08/26/2022       BMP  Lab Results   Component Value Date     12/09/2022    K 3.8 12/09/2022     12/09/2022    CO2 28 12/09/2022    BUN 10 12/09/2022    CREATININE 0.9 12/09/2022    CALCIUM 9.0 12/09/2022    ANIONGAP 8 12/09/2022    ESTGFRAFRICA >60.0 12/09/2021    EGFRNONAA >60.0 12/09/2021    AST 13 12/09/2022    ALT 5 (L) 12/09/2022    PROT 6.4 12/09/2022          No results found for: IGE     No results found for: ASPERGILLUS  No results found for: AFUMIGATUSCL     No results found for: ACE     Diagnostic Results:  I have personally reviewed today the following studies:    CT Chest Lung Screening Low Dose  Narrative: EXAMINATION:  CT CHEST LUNG SCREENING LOW DOSE    CLINICAL HISTORY:  Lung cancer screening, >= 30 pk-yr current smoker (Age 55-80y); Encounter for screening for malignant neoplasm of respiratory organs    TECHNIQUE:  CT of the thorax was performed with low dose, lung screening protocol.  No contrast was administered.  Sagittal and coronal reconstructions were obtained.    COMPARISON:  01/02/2020    FINDINGS:  Lungs: There are no abnormal opacities that require further evaluation.  A few scattered old calcified granulomas are present.  There  are no suspicious nodules.  The previously identified left lower lobe 2 mm nodule is not visualized on this exam.  Mild atelectasis is present in the area.  The lungs show mild findings consistent with emphysema.    Pleura:   No effusion..    Heart and pericardium: Normal size without effusion.    Aorta and vasculature: Atherosclerosis including coronary arteries.    Chest wall and skeletal structures: Unremarkable except age-appropriate degenerative changes.    Upper abdomen: Unremarkable.  Impression: Lung-RADS Category:  1 - Negative - Continue annual screening with LDCT in 12 months.    Clinically or potentially clinically significant non lung cancer finding:  None.    Prior Lung Cancer Modifier:  No history of prior lung cancer.    Electronically signed by: Larry Frazier MD  Date:    09/07/2022  Time:    15:13     Compliance Information 3/3/2023 - 5/31/2023  Compliance Summary  3/3/2023 - 5/31/2023 (90 days)  Days with Device Usage 90 days  Days without Device Usage 0 days  Percent Days with Device Usage 100.0%  Cumulative Usage 29 days 12 hrs. 29 mins. 16 secs.  Maximum Usage (1 Day) 10 hrs. 35 mins. 8 secs.  Average Usage (All Days) 7 hrs. 52 mins. 19 secs.  Average Usage (Days Used) 7 hrs. 52 mins. 19 secs.  Minimum Usage (1 Day) 2 hrs. 48 mins. 25 secs.  Percent of Days with Usage >= 4 Hours 96.7%  Percent of Days with Usage < 4 Hours 3.3%  Date Range  Total Blower Time 33 days 1 hrs. 56 mins. 39 secs.  Average AHI 1.3  Auto-CPAP Summary  Auto-CPAP Mean Pressure 6.9 cmH2O  Auto-CPAP Peak Average Pressure 8.7 cmH2O  Device Pressure <= 90% of Time 8.6 cmH2O  Average Time in Large Leak Per Day 1 mins. 26 secs.  Assessment/Plan:     Problem List Items Addressed This Visit       Anxiety    Mild episode of recurrent major depressive disorder    Hypertension associated with diabetes     On NORVASC, Lisinopril, HCTZ         Hyperlipidemia associated with type 2 diabetes mellitus     Stable CRESTOR         DASHA  (obstructive sleep apnea) - Primary     Stanley 0  [id number]  Usage > 4 hrs was 96.7%  AHI 1.34   Using and Benefits from CPAP           Severe obesity with body mass index (BMI) of 35.0 to 39.9 with comorbidity     Patient would benefit from weight loss and has tried to set realistic goals to achieve success. Lifestyle changes were discussed on eating healthy, exercising at least 150 minutes weekly, and reducing sedentary behavior.   Discussed the risk factors associated with obesity: Arthritis/DASHA/Diabetes/Fatty Liver/Cardiovascular disease/GERD/HTN/HLP.          Pulmonary nodule, left     No nodule on LDCT 2022  Emphysematous changes         Type 2 diabetes mellitus without complication, without long-term current use of insulin     On metformin         Insomnia due to psychological stress     Added Ambien  Stress and difficult sleep preparing for Post Doc           Relevant Medications    zolpidem (AMBIEN) 5 MG Tab      Nasal congestion is unusual to be related to trazodone, patient states improved on cessation       The patient was given open opportunity to ask questions and/or express concerns about treatment plan.   All questions/concerns were discussed.     Follow up in about 4 months (around 10/1/2023), or Added Ambien, Adherence CPAP, sleep hygeien.    This note was prepared using voice recognition system and is likely to have sound alike errors that may have been overlooked even after proof reading.  Please call me with any questions    Discussed diagnosis, its evaluation, treatment and usual course. All questions answered.    Thank you for the courtesy of participating in the care of this patient    Philip Hays MD      Personal Diagnostic Review  [x]  LDCT    [x]  Stanley    [x]  Download    []  6MWD    []  LABS    []  CHEST CT    []  PET CT    []  Biopsy results

## 2023-06-01 NOTE — ASSESSMENT & PLAN NOTE
Patient would benefit from weight loss and has tried to set realistic goals to achieve success. Lifestyle changes were discussed on eating healthy, exercising at least 150 minutes weekly, and reducing sedentary behavior.   Discussed the risk factors associated with obesity: Arthritis/DASHA/Diabetes/Fatty Liver/Cardiovascular disease/GERD/HTN/HLP.

## 2023-06-09 ENCOUNTER — LAB VISIT (OUTPATIENT)
Dept: LAB | Facility: HOSPITAL | Age: 63
End: 2023-06-09
Attending: FAMILY MEDICINE
Payer: OTHER GOVERNMENT

## 2023-06-09 DIAGNOSIS — G47.00 INSOMNIA, UNSPECIFIED TYPE: ICD-10-CM

## 2023-06-09 DIAGNOSIS — F41.9 ANXIETY: ICD-10-CM

## 2023-06-09 DIAGNOSIS — G47.33 OBSTRUCTIVE SLEEP APNEA SYNDROME: ICD-10-CM

## 2023-06-09 DIAGNOSIS — E11.9 TYPE 2 DIABETES MELLITUS WITHOUT COMPLICATION, WITHOUT LONG-TERM CURRENT USE OF INSULIN: ICD-10-CM

## 2023-06-09 LAB
ESTIMATED AVG GLUCOSE: 126 MG/DL (ref 68–131)
HBA1C MFR BLD: 6 % (ref 4–5.6)
TSH SERPL DL<=0.005 MIU/L-ACNC: 0.62 UIU/ML (ref 0.4–4)

## 2023-06-09 PROCEDURE — 84443 ASSAY THYROID STIM HORMONE: CPT | Performed by: FAMILY MEDICINE

## 2023-06-09 PROCEDURE — 36415 COLL VENOUS BLD VENIPUNCTURE: CPT | Mod: PO | Performed by: FAMILY MEDICINE

## 2023-06-09 PROCEDURE — 83036 HEMOGLOBIN GLYCOSYLATED A1C: CPT | Performed by: FAMILY MEDICINE

## 2023-06-12 ENCOUNTER — TELEPHONE (OUTPATIENT)
Dept: FAMILY MEDICINE | Facility: CLINIC | Age: 63
End: 2023-06-12
Payer: OTHER GOVERNMENT

## 2023-06-12 DIAGNOSIS — Z79.899 ENCOUNTER FOR LONG-TERM (CURRENT) USE OF MEDICATIONS: Primary | ICD-10-CM

## 2023-06-12 NOTE — TELEPHONE ENCOUNTER
----- Message from Paul Spencer MD sent at 6/12/2023 12:58 PM CDT -----   Lab ok .Schedule PSA, Vitamin B12, lipid panel, CMP, hemoglobin A1c,  urine microalbumin within 6 months of the date of this last test.  My nurse will contact you to arrange.  Thanks,  Dr. Spencer

## 2023-06-15 RX ORDER — TRAZODONE HYDROCHLORIDE 50 MG/1
TABLET ORAL
Qty: 30 TABLET | Refills: 1 | OUTPATIENT
Start: 2023-06-15

## 2023-06-15 NOTE — TELEPHONE ENCOUNTER
No care due was identified.  Health [facility] Embedded Care Due Messages. Reference number: [id number].   6/15/2023 12:07:12 AM CDT

## 2023-06-30 ENCOUNTER — PATIENT MESSAGE (OUTPATIENT)
Dept: FAMILY MEDICINE | Facility: CLINIC | Age: 63
End: 2023-06-30
Payer: OTHER GOVERNMENT

## 2023-06-30 DIAGNOSIS — E78.5 HYPERLIPIDEMIA ASSOCIATED WITH TYPE 2 DIABETES MELLITUS: ICD-10-CM

## 2023-06-30 DIAGNOSIS — E11.9 TYPE 2 DIABETES MELLITUS WITHOUT COMPLICATION, WITHOUT LONG-TERM CURRENT USE OF INSULIN: ICD-10-CM

## 2023-06-30 DIAGNOSIS — E66.01 SEVERE OBESITY WITH BODY MASS INDEX (BMI) OF 35.0 TO 39.9 WITH COMORBIDITY: ICD-10-CM

## 2023-06-30 DIAGNOSIS — I25.10 MILD CAD: ICD-10-CM

## 2023-06-30 DIAGNOSIS — I15.2 HYPERTENSION ASSOCIATED WITH DIABETES: ICD-10-CM

## 2023-06-30 DIAGNOSIS — E11.59 HYPERTENSION ASSOCIATED WITH DIABETES: ICD-10-CM

## 2023-06-30 DIAGNOSIS — I25.2 MYOCARDIAL INFARCT, OLD: ICD-10-CM

## 2023-06-30 DIAGNOSIS — E11.69 HYPERLIPIDEMIA ASSOCIATED WITH TYPE 2 DIABETES MELLITUS: ICD-10-CM

## 2023-06-30 DIAGNOSIS — G47.33 OBSTRUCTIVE SLEEP APNEA SYNDROME: ICD-10-CM

## 2023-06-30 NOTE — TELEPHONE ENCOUNTER
Pharmacy is requesting 90 day supply of this medication be sent in. Please review and sign pended orders if you agree

## 2023-06-30 NOTE — TELEPHONE ENCOUNTER
No care due was identified.  Health [facility] Embedded Care Due Messages. Reference number: [id number].   6/30/2023 11:02:20 AM CDT

## 2023-07-01 RX ORDER — AMLODIPINE BESYLATE 5 MG/1
5 TABLET ORAL DAILY
Qty: 90 TABLET | Refills: 0 | OUTPATIENT
Start: 2023-07-01 | End: 2023-09-29

## 2023-07-27 ENCOUNTER — OFFICE VISIT (OUTPATIENT)
Dept: SLEEP MEDICINE | Facility: CLINIC | Age: 63
End: 2023-07-27
Payer: OTHER GOVERNMENT

## 2023-07-27 VITALS — HEIGHT: 75 IN | BODY MASS INDEX: 32.95 KG/M2 | WEIGHT: 265 LBS

## 2023-07-27 DIAGNOSIS — G47.33 OSA (OBSTRUCTIVE SLEEP APNEA): ICD-10-CM

## 2023-07-27 DIAGNOSIS — E66.01 SEVERE OBESITY WITH BODY MASS INDEX (BMI) OF 35.0 TO 39.9 WITH COMORBIDITY: ICD-10-CM

## 2023-07-27 DIAGNOSIS — R91.1 PULMONARY NODULE, LEFT: ICD-10-CM

## 2023-07-27 DIAGNOSIS — E11.59 HYPERTENSION ASSOCIATED WITH DIABETES: ICD-10-CM

## 2023-07-27 DIAGNOSIS — E11.69 HYPERLIPIDEMIA ASSOCIATED WITH TYPE 2 DIABETES MELLITUS: ICD-10-CM

## 2023-07-27 DIAGNOSIS — E78.5 HYPERLIPIDEMIA ASSOCIATED WITH TYPE 2 DIABETES MELLITUS: ICD-10-CM

## 2023-07-27 DIAGNOSIS — I15.2 HYPERTENSION ASSOCIATED WITH DIABETES: ICD-10-CM

## 2023-07-27 DIAGNOSIS — E11.9 TYPE 2 DIABETES MELLITUS WITHOUT COMPLICATION, WITHOUT LONG-TERM CURRENT USE OF INSULIN: ICD-10-CM

## 2023-07-27 DIAGNOSIS — F51.02 INSOMNIA DUE TO PSYCHOLOGICAL STRESS: Primary | ICD-10-CM

## 2023-07-27 PROCEDURE — 99213 PR OFFICE/OUTPT VISIT, EST, LEVL III, 20-29 MIN: ICD-10-PCS | Mod: 95,,, | Performed by: INTERNAL MEDICINE

## 2023-07-27 PROCEDURE — 99213 OFFICE O/P EST LOW 20 MIN: CPT | Mod: 95,,, | Performed by: INTERNAL MEDICINE

## 2023-07-27 RX ORDER — TRAZODONE HYDROCHLORIDE 50 MG/1
50 TABLET ORAL NIGHTLY PRN
Qty: 30 TABLET | Refills: 3 | Status: SHIPPED | OUTPATIENT
Start: 2023-07-27 | End: 2023-12-04

## 2023-07-27 NOTE — PROGRESS NOTES
The patient location is: [facility]  The chief complaint leading to consultation is: Insomnia meds    Visit type: audiovisual    Face to Face time with patient: 8 mins  20 minutes of total time spent on the encounter, which includes face to face time and non-face to face time preparing to see the patient (eg, review of tests), Obtaining and/or reviewing separately obtained history, Documenting clinical information in the electronic or other health record, Independently interpreting results (not separately reported) and communicating results to the patient/family/caregiver, or Care coordination (not separately reported).         Each patient to whom he or she provides medical services by telemedicine is:  (1) informed of the relationship between the physician and patient and the respective role of any other health care provider with respect to management of the patient; and (2) notified that he or she may decline to receive medical services by telemedicine and may withdraw from such care at any time.    Notes:                                           Pulmonary Outpatient  Visit     Subjective:       Patient ID: Gopi Garcia is a 62 y.o. male.    Social History     Tobacco Use   Smoking Status Some Days    Packs/day: 1.00    Years: 30.00    Pack years: 30.00    Types: Cigarettes, Cigars    Last attempt to quit: 10/17/2007    Years since quitting: 15.7   Smokeless Tobacco Never            Chief Complaint: Apnea      Gopi Garcia is 62 y.o.  Referred by Paul Spencer MD  Last seen 08/2019: DASHA on CPAP  Prior records reveiwed  Last LDCT: emphysematous changes  30 Pack year smoking Hx quit 2007  Smoke Cigars  [facility] PHD in education  Stress: trouble with sleep  Recently given Trazadone: caused nasal congestion  Asks for ambien worked well on past  CPAP data > 4 hrs was 96.75  AHI 1.3            The diagnostic polysomnography revealed a severe obstructive sleep apnea / hypopnea syndrome (A +  H Index = 53.5 events  / hr asleep with 23.4 arousals / hr asleep for the study, and a mean SpO2 value of 92.7 %, moderate, minimum oxygen  saturation during sleep of 83.0 %, and waking baseline SpO2 = 97 %. There were no RERAs (respiratory effort- related  arousals). Sporadic, mild to moderately loud snoring was noted.  2. CPAP was initiated at 1:19 am. The titration polysomnography revealed that though 8 cm, 9 cm and 10 cm were each  completely tested and ineffective (AHI= 14.3, 14.4 and 21.9 respectively), and each had a high rate of central sleep apneas  predominating; Note too that central and mixed apneas did occur pre - CPAP, but did not predominate. The overall A + H  Index was 15.4 / hr asleep, with only 0.5 respiratory event - related arousals / hr asleep for the titration trial (there were no  RERAs). The mean SpO2 value was 94.2 % throughout the study, with a minimum oxygen saturation during sleep of 88.0 %  (waking baseline SpO2 was 96 %). Snoring was soft to absent at 10 cm, but was not eliminated. The patient's own [employer] nasal CPAP mask was used and was well - tolerated. Please see PAP trial outcomes table, below.      07/27/2023  Followup visit  Concern  Recently started Ambien: gets to sleep  Duration not satisfactory  Bed time: 10-11 pm  Wake time: 6-7 am  On Ambien 5 mg  Fatigue during day  Using CPAP                Review of Systems   All other systems reviewed and are negative.    Outpatient Encounter Medications as of 7/27/2023   Medication Sig Dispense Refill    amLODIPine (NORVASC) 5 MG tablet TAKE 1 TABLET DAILY 30 tablet 1    aspirin (ECOTRIN) 81 MG EC tablet Take 81 mg by mouth once daily.      blood sugar diagnostic Strp Test glucose 1 x daily 50 strip prn    ciclopirox (PENLAC) 8 % Soln Apply topically nightly. Apply to adjacent skin and affected nails daily.  Remove with alcohol every 7 days. 6.6 mL 11    EScitalopram oxalate (LEXAPRO) 10 MG tablet Take 1 tablet (10 mg total)  by mouth once daily. 90 tablet 1    lancets Misc As directed 100 each prn    lisinopriL-hydrochlorothiazide (PRINZIDE,ZESTORETIC) 20-25 mg Tab Take 1 tablet by mouth once daily. 90 tablet 1    metFORMIN (GLUCOPHAGE-XR) 500 MG ER 24hr tablet TAKE 1 TABLET DAILY 90 tablet 1    nitroGLYCERIN (NITROSTAT) 0.4 MG SL tablet DISSOLVE 1 TABLET UNDER THE TONGUE EVERY 5 MINUTES AS NEEDED FOR CHEST PAIN. MAXIMUM 3 TABLETS. DO NOT TAKE IF USING VIAGRA 25 tablet 11    omega-3 fatty acids/fish oil (FISH OIL-OMEGA-3 FATTY ACIDS) 300-1,000 mg capsule Take by mouth once daily.      oxycodone-acetaminophen (PERCOCET)  mg per tablet Take 1 tablet by mouth 4 (four) times daily.  0    rosuvastatin (CRESTOR) 20 MG tablet TAKE 1 TABLET DAILY 90 tablet 3    sildenafil (VIAGRA) 100 MG tablet Take 1 tablet (100 mg total) by mouth daily as needed for Erectile Dysfunction (Don't take if using any nitroglycerin). 18 tablet 3    zolpidem (AMBIEN) 5 MG Tab Take 1 tablet (5 mg total) by mouth every evening. 30 tablet 3    blood-glucose meter (FREESTYLE FREEDOM LITE) kit Use as instructed 1 each 11    ketoconazole (NIZORAL) 2 % cream Apply topically once daily. 60 g 1    traZODone (DESYREL) 50 MG tablet Take 1 tablet (50 mg total) by mouth nightly as needed for Insomnia. 30 tablet 3     No facility-administered encounter medications on file as of 7/27/2023.       The following portions of the patient's history were reviewed and updated as appropriate: He  has a past medical history of Anxiety, AR (allergic rhinitis), CAD (coronary artery disease), Carrier of hemochromatosis HFE gene mutation, Chronic low back pain, Colon polyp (2010), Coronary artery disease, Depression, Diverticulosis, ED (erectile dysfunction), Fatty liver, Hepatomegaly, HTN (hypertension), Hyperlipidemia, Kidney stone (2009), Myocardial infarct, old, Pulmonary nodule, left (01/02/2020), Sleep apnea, and Type 2 diabetes mellitus without complication, without long-term  current use of insulin (06/08/2021).  He does not have any pertinent problems on file.  He  has a past surgical history that includes Inguinal hernia repair (Right, 2011); Vasectomy (2005); Cardiac catheterization (2006); Colonoscopy (49 years old); and Colonoscopy (N/A, 6/2/2020).  His family history includes Alzheimer's disease in his mother; Colon polyps in his brother; Heart attack in his paternal grandfather; Hemochromatosis in his brother and brother; Lung cancer in his father; Prostate cancer in his brother.  He  reports that he has been smoking cigarettes and cigars. He has a 30.00 pack-year smoking history. He has never used smokeless tobacco. He reports current alcohol use. He reports that he does not use drugs.  He has a current medication list which includes the following prescription(s): amlodipine, aspirin, blood sugar diagnostic, ciclopirox, escitalopram oxalate, lancets, lisinopril-hydrochlorothiazide, metformin, nitroglycerin, fish oil-omega-3 fatty acids, oxycodone-acetaminophen, rosuvastatin, sildenafil, zolpidem, blood-glucose meter, ketoconazole, and trazodone.  Current Outpatient Medications on File Prior to Visit   Medication Sig Dispense Refill    amLODIPine (NORVASC) 5 MG tablet TAKE 1 TABLET DAILY 30 tablet 1    aspirin (ECOTRIN) 81 MG EC tablet Take 81 mg by mouth once daily.      blood sugar diagnostic Strp Test glucose 1 x daily 50 strip prn    ciclopirox (PENLAC) 8 % Soln Apply topically nightly. Apply to adjacent skin and affected nails daily.  Remove with alcohol every 7 days. 6.6 mL 11    EScitalopram oxalate (LEXAPRO) 10 MG tablet Take 1 tablet (10 mg total) by mouth once daily. 90 tablet 1    lancets Misc As directed 100 each prn    lisinopriL-hydrochlorothiazide (PRINZIDE,ZESTORETIC) 20-25 mg Tab Take 1 tablet by mouth once daily. 90 tablet 1    metFORMIN (GLUCOPHAGE-XR) 500 MG ER 24hr tablet TAKE 1 TABLET DAILY 90 tablet 1    nitroGLYCERIN (NITROSTAT) 0.4 MG SL tablet DISSOLVE 1  TABLET UNDER THE TONGUE EVERY 5 MINUTES AS NEEDED FOR CHEST PAIN. MAXIMUM 3 TABLETS. DO NOT TAKE IF USING VIAGRA 25 tablet 11    omega-3 fatty acids/fish oil (FISH OIL-OMEGA-3 FATTY ACIDS) 300-1,000 mg capsule Take by mouth once daily.      oxycodone-acetaminophen (PERCOCET)  mg per tablet Take 1 tablet by mouth 4 (four) times daily.  0    rosuvastatin (CRESTOR) 20 MG tablet TAKE 1 TABLET DAILY 90 tablet 3    sildenafil (VIAGRA) 100 MG tablet Take 1 tablet (100 mg total) by mouth daily as needed for Erectile Dysfunction (Don't take if using any nitroglycerin). 18 tablet 3    zolpidem (AMBIEN) 5 MG Tab Take 1 tablet (5 mg total) by mouth every evening. 30 tablet 3    blood-glucose meter (FREESTYLE FREEDOM LITE) kit Use as instructed 1 each 11    ketoconazole (NIZORAL) 2 % cream Apply topically once daily. 60 g 1     No current facility-administered medications on file prior to visit.     He has No Known Allergies..      BP Readings from Last 3 Encounters:   06/01/23 122/80   05/26/23 128/76   04/18/23 131/86     Snoring / Sleep:          MMRC Dyspnea Scale (4 is worst)     [] MMRC 0: Dyspneic on strenuous excercise (0 points)    [] MMRC 1: Dyspneic on walking a slight hill (0 points)    [] MMRC 2: Dyspneic on walking level ground; must stop occasionally due to breathlessness (1 point)    [] MMRC 3: Must stop for breathlessness after walking 100 yards or after a few minutes (2 points)    [] MMRC 4: Cannot leave house; breathless on dressing/undressing (3 points)            EPWORTH SLEEPINESS SCALE 7/27/2023   Sitting and reading 0   Watching TV 0   Sitting, inactive in a public place (e.g. a theatre or a meeting) 0   As a passenger in a car for an hour without a break 0   Lying down to rest in the afternoon when circumstances permit 3   Sitting and talking to someone 0   Sitting quietly after a lunch without alcohol 0   In a car, while stopped for a few minutes in traffic 0   Total score 3       "          Objective:     Vital Signs (Most Recent)  Height and Weight  Height: 6' 3" (190.5 cm)  Weight: 120.2 kg (265 lb)  BSA (Calculated - sq m): 2.52 sq meters  BMI (Calculated): 33.1  Weight in (lb) to have BMI = 25: 199.6]  Wt Readings from Last 2 Encounters:   07/27/23 120.2 kg (265 lb)   06/01/23 126.4 kg (278 lb 10.6 oz)       Physical Exam  Vitals and nursing note reviewed.   Constitutional:       Appearance: Normal appearance.   HENT:      Head: Normocephalic and atraumatic.      Right Ear: Tympanic membrane normal.      Nose: Nose normal.   Eyes:      Pupils: Pupils are equal, round, and reactive to light.   Cardiovascular:      Rate and Rhythm: Normal rate and regular rhythm.      Pulses: Normal pulses.      Heart sounds: Normal heart sounds.   Pulmonary:      Effort: Pulmonary effort is normal.      Breath sounds: Normal breath sounds.   Abdominal:      General: Bowel sounds are normal.      Palpations: Abdomen is soft.   Musculoskeletal:         General: Normal range of motion.      Cervical back: Normal range of motion.   Skin:     General: Skin is warm and dry.      Capillary Refill: Capillary refill takes less than 2 seconds.   Neurological:      General: No focal deficit present.      Mental Status: He is alert and oriented to person, place, and time.   Psychiatric:         Mood and Affect: Mood normal.        Laboratory  Lab Results   Component Value Date    WBC 7.65 08/26/2022    RBC 5.25 08/26/2022    HGB 16.6 08/26/2022    HCT 47.6 08/26/2022    MCV 91 08/26/2022    MCH 31.6 (H) 08/26/2022    MCHC 34.9 08/26/2022    RDW 12.2 08/26/2022     08/26/2022    MPV 10.0 08/26/2022    GRAN 4.4 08/26/2022    GRAN 56.8 08/26/2022    LYMPH 2.4 08/26/2022    LYMPH 31.5 08/26/2022    MONO 0.6 08/26/2022    MONO 7.6 08/26/2022    EOS 0.2 08/26/2022    BASO 0.06 08/26/2022    EOSINOPHIL 3.0 08/26/2022    BASOPHIL 0.8 08/26/2022       BMP  Lab Results   Component Value Date     12/09/2022    K 3.8 " 12/09/2022     12/09/2022    CO2 28 12/09/2022    BUN 10 12/09/2022    CREATININE 0.9 12/09/2022    CALCIUM 9.0 12/09/2022    ANIONGAP 8 12/09/2022    ESTGFRAFRICA >60.0 12/09/2021    EGFRNONAA >60.0 12/09/2021    AST 13 12/09/2022    ALT 5 (L) 12/09/2022    PROT 6.4 12/09/2022          No results found for: IGE     No results found for: ASPERGILLUS  No results found for: AFUMIGATUSCL     No results found for: ACE     Diagnostic Results:  I have personally reviewed today the following studies:    CT Chest Lung Screening Low Dose  Narrative: EXAMINATION:  CT CHEST LUNG SCREENING LOW DOSE    CLINICAL HISTORY:  Lung cancer screening, >= 30 pk-yr current smoker (Age 55-80y); Encounter for screening for malignant neoplasm of respiratory organs    TECHNIQUE:  CT of the thorax was performed with low dose, lung screening protocol.  No contrast was administered.  Sagittal and coronal reconstructions were obtained.    COMPARISON:  01/02/2020    FINDINGS:  Lungs: There are no abnormal opacities that require further evaluation.  A few scattered old calcified granulomas are present.  There are no suspicious nodules.  The previously identified left lower lobe 2 mm nodule is not visualized on this exam.  Mild atelectasis is present in the area.  The lungs show mild findings consistent with emphysema.    Pleura:   No effusion..    Heart and pericardium: Normal size without effusion.    Aorta and vasculature: Atherosclerosis including coronary arteries.    Chest wall and skeletal structures: Unremarkable except age-appropriate degenerative changes.    Upper abdomen: Unremarkable.  Impression: Lung-RADS Category:  1 - Negative - Continue annual screening with LDCT in 12 months.    Clinically or potentially clinically significant non lung cancer finding:  None.    Prior Lung Cancer Modifier:  No history of prior lung cancer.    Electronically signed by: Larry Frazier MD  Date:    09/07/2022  Time:    15:13     Compliance  Information 3/3/2023 - 5/31/2023  Compliance Summary  3/3/2023 - 5/31/2023 (90 days)  Days with Device Usage 90 days  Days without Device Usage 0 days  Percent Days with Device Usage 100.0%  Cumulative Usage 29 days 12 hrs. 29 mins. 16 secs.  Maximum Usage (1 Day) 10 hrs. 35 mins. 8 secs.  Average Usage (All Days) 7 hrs. 52 mins. 19 secs.  Average Usage (Days Used) 7 hrs. 52 mins. 19 secs.  Minimum Usage (1 Day) 2 hrs. 48 mins. 25 secs.  Percent of Days with Usage >= 4 Hours 96.7%  Percent of Days with Usage < 4 Hours 3.3%  Date Range  Total Blower Time 33 days 1 hrs. 56 mins. 39 secs.  Average AHI 1.3  Auto-CPAP Summary  Auto-CPAP Mean Pressure 6.9 cmH2O  Auto-CPAP Peak Average Pressure 8.7 cmH2O  Device Pressure <= 90% of Time 8.6 cmH2O  Average Time in Large Leak Per Day 1 mins. 26 secs.  Assessment/Plan:     Problem List Items Addressed This Visit       Insomnia due to psychological stress - Primary    Relevant Medications    traZODone (DESYREL) 50 MG tablet    Hypertension associated with diabetes     On NORVASC, Lisinopril, HCTZ         Hyperlipidemia associated with type 2 diabetes mellitus     Stable CRESTOR         DASHA (obstructive sleep apnea)     Attests using CPAP and benefits         Severe obesity with body mass index (BMI) of 35.0 to 39.9 with comorbidity     Patient would benefit from weight loss and has tried to set realistic goals to achieve success. Lifestyle changes were discussed on eating healthy, exercising at least 150 minutes weekly, and reducing sedentary behavior.   Discussed the risk factors associated with obesity: Arthritis/DASHA/Diabetes/Fatty Liver/Cardiovascular disease/GERD/HTN/HLP.          Pulmonary nodule, left     No nodule on LDCT 2022  Emphysematous changes         Type 2 diabetes mellitus without complication, without long-term current use of insulin     On metformin           Willing to try Ambien with addition of Trazodone       The patient was given open opportunity to ask  questions and/or express concerns about treatment plan.   All questions/concerns were discussed.     Follow up in about 4 months (around 11/27/2023), or added Trazodone, Download CPAP.    This note was prepared using voice recognition system and is likely to have sound alike errors that may have been overlooked even after proof reading.  Please call me with any questions    Discussed diagnosis, its evaluation, treatment and usual course. All questions answered.    Thank you for the courtesy of participating in the care of this patient    Philip Hays MD      Personal Diagnostic Review  [x]  LDCT    [x]  [place]    [x]  Download    []  6MWD    []  LABS    []  CHEST CT    []  PET CT    []  Biopsy results

## 2023-07-30 NOTE — TELEPHONE ENCOUNTER
Spoke with patient.  Had a fever of 101.8.  Some general aches and headache.  No focal upper respiratory symptoms.  No abdominal pain.  No urinary complaints.  No cough.  At this point no focus to treat with antibiotic.  If he starts developing any focal symptoms he'll let us know or if the fever is not going away the next day or so.  ER precautions given for worsening symptoms.   Walking

## 2023-08-25 ENCOUNTER — HOSPITAL ENCOUNTER (OUTPATIENT)
Dept: RADIOLOGY | Facility: HOSPITAL | Age: 63
Discharge: HOME | End: 2023-08-25
Attending: NURSE PRACTITIONER
Payer: OTHER GOVERNMENT

## 2023-08-25 DIAGNOSIS — R05.9 COUGH: ICD-10-CM

## 2023-08-25 DIAGNOSIS — R50.9 FEVER, UNSPECIFIED FEVER CAUSE: ICD-10-CM

## 2023-08-25 PROCEDURE — 71046 X-RAY EXAM CHEST 2 VIEWS: CPT | Mod: 26,,, | Performed by: RADIOLOGY

## 2023-08-25 PROCEDURE — 71046 X-RAY EXAM CHEST 2 VIEWS: CPT | Mod: TC,PO

## 2023-08-25 PROCEDURE — 71046 XR CHEST PA AND LATERAL: ICD-10-PCS | Mod: 26,,, | Performed by: RADIOLOGY

## 2023-08-29 ENCOUNTER — PATIENT MESSAGE (OUTPATIENT)
Dept: FAMILY MEDICINE | Facility: CLINIC | Age: 63
End: 2023-08-29
Payer: OTHER GOVERNMENT

## 2023-08-29 RX ORDER — FLUTICASONE PROPIONATE 50 MCG
1 SPRAY, SUSPENSION (ML) NASAL DAILY
Qty: 54 ML | Refills: 3 | Status: SHIPPED | OUTPATIENT
Start: 2023-08-29

## 2023-08-29 NOTE — TELEPHONE ENCOUNTER
No care due was identified.  [facility] Embedded Care Due Messages. Reference number: [id number].   8/29/2023 10:45:01 AM CDT

## 2023-09-07 ENCOUNTER — PATIENT MESSAGE (OUTPATIENT)
Dept: FAMILY MEDICINE | Facility: CLINIC | Age: 63
End: 2023-09-07
Payer: OTHER GOVERNMENT

## 2023-09-11 NOTE — TELEPHONE ENCOUNTER
Please confirm as to whether and when he quit smoking.  We have listed that he quit in 2007, but then has something about smoking some days.  If he quit smoking in 2007 then no further screening would be recommended as we only do this for 15 years after quitting smoking.  If he was still smoking within the past 15 years then would proceed.

## 2023-09-12 ENCOUNTER — PATIENT MESSAGE (OUTPATIENT)
Dept: FAMILY MEDICINE | Facility: CLINIC | Age: 63
End: 2023-09-12
Payer: OTHER GOVERNMENT

## 2023-09-12 ENCOUNTER — TELEPHONE (OUTPATIENT)
Dept: FAMILY MEDICINE | Facility: CLINIC | Age: 63
End: 2023-09-12
Payer: OTHER GOVERNMENT

## 2023-09-12 DIAGNOSIS — Z12.2 ENCOUNTER FOR SCREENING FOR LUNG CANCER: ICD-10-CM

## 2023-09-12 DIAGNOSIS — F17.200 SMOKING: Primary | ICD-10-CM

## 2023-09-20 DIAGNOSIS — I25.10 MILD CAD: ICD-10-CM

## 2023-09-20 DIAGNOSIS — E11.59 HYPERTENSION ASSOCIATED WITH DIABETES: ICD-10-CM

## 2023-09-20 DIAGNOSIS — I15.2 HYPERTENSION ASSOCIATED WITH DIABETES: ICD-10-CM

## 2023-09-20 DIAGNOSIS — I25.2 MYOCARDIAL INFARCT, OLD: ICD-10-CM

## 2023-09-20 DIAGNOSIS — E66.01 SEVERE OBESITY WITH BODY MASS INDEX (BMI) OF 35.0 TO 39.9 WITH COMORBIDITY: ICD-10-CM

## 2023-09-20 DIAGNOSIS — G47.33 OBSTRUCTIVE SLEEP APNEA SYNDROME: ICD-10-CM

## 2023-09-20 DIAGNOSIS — E11.69 HYPERLIPIDEMIA ASSOCIATED WITH TYPE 2 DIABETES MELLITUS: ICD-10-CM

## 2023-09-20 DIAGNOSIS — E78.5 HYPERLIPIDEMIA ASSOCIATED WITH TYPE 2 DIABETES MELLITUS: ICD-10-CM

## 2023-09-20 RX ORDER — ROSUVASTATIN CALCIUM 20 MG/1
TABLET, COATED ORAL
Qty: 90 TABLET | Refills: 3 | Status: SHIPPED | OUTPATIENT
Start: 2023-09-20

## 2023-09-22 ENCOUNTER — HOSPITAL ENCOUNTER (OUTPATIENT)
Dept: RADIOLOGY | Facility: HOSPITAL | Age: 63
Discharge: HOME | End: 2023-09-22
Attending: FAMILY MEDICINE
Payer: OTHER GOVERNMENT

## 2023-09-22 DIAGNOSIS — Z12.2 ENCOUNTER FOR SCREENING FOR LUNG CANCER: ICD-10-CM

## 2023-09-22 DIAGNOSIS — F17.200 SMOKING: ICD-10-CM

## 2023-09-22 PROCEDURE — 71271 CT CHEST LUNG SCREENING LOW DOSE: ICD-10-PCS | Mod: 26,,, | Performed by: RADIOLOGY

## 2023-09-22 PROCEDURE — 71271 CT THORAX LUNG CANCER SCR C-: CPT | Mod: 26,,, | Performed by: RADIOLOGY

## 2023-09-22 PROCEDURE — 71271 CT THORAX LUNG CANCER SCR C-: CPT | Mod: TC,PO

## 2023-09-26 ENCOUNTER — PATIENT MESSAGE (OUTPATIENT)
Dept: FAMILY MEDICINE | Facility: CLINIC | Age: 63
End: 2023-09-26
Payer: OTHER GOVERNMENT

## 2023-10-25 RX ORDER — ESCITALOPRAM OXALATE 10 MG/1
10 TABLET ORAL
Qty: 90 TABLET | Refills: 1 | Status: SHIPPED | OUTPATIENT
Start: 2023-10-25 | End: 2024-03-20

## 2023-10-25 RX ORDER — FLUTICASONE PROPIONATE 50 MCG
SPRAY, SUSPENSION (ML) NASAL
Refills: 0 | OUTPATIENT
Start: 2023-10-25

## 2023-10-25 NOTE — TELEPHONE ENCOUNTER
Refill Decision Note   Gopi Garcia  is requesting a refill authorization.  Brief Assessment and Rationale for Refill:  Quick Discontinue     Medication Therapy Plan:    Pharmacy is requesting new scripts for the following medications without required information, (sig/ frequency/qty/etc)      Medication Reconciliation Completed: No     Comments: Pharmacies have been requesting medications for patients without required information, (sig, frequency, qty, etc.). In addition, requests are sent for medication(s) pt. are currently not taking, and medications patients have never taken.    We have spoken to the pharmacies about these request types and advised their teams previously that we are unable to assess these New Script requests and require all details for these requests. This is a known issue and has been reported.     Note composed:11:34 AM 10/25/2023

## 2023-10-25 NOTE — TELEPHONE ENCOUNTER
Care Due:                  Date            Visit Type   Department     Provider  --------------------------------------------------------------------------------                                EP -                              PRIMARY      [facility] FAMILY  Last Visit: 05-      CARE ([facility])   MEDICINE       Paul Spencer  Next Visit: None Scheduled  None         None Found                                                            Last  Test          Frequency    Reason                     Performed    Due Date  --------------------------------------------------------------------------------    CMP.........  12 months..  lisinopriL-hydrochlorothi  12- 12-                             azide, metFORMIN.........    HBA1C.......  6 months...  metFORMIN................  06- 12-    [facility] Embedded Care Due Messages. Reference number: [id number].   10/25/2023 11:20:08 AM CDT

## 2023-10-25 NOTE — TELEPHONE ENCOUNTER
Refill Decision Note   Gopi Garcia  is requesting a refill authorization.  Brief Assessment and Rationale for Refill:  Approve     Medication Therapy Plan:  FLOS      Comments:     Note composed:12:51 PM 10/25/2023             Appointments     Last Visit   5/26/2023 Paul Spencer MD   Next Visit   10/25/2023 Paul Spencer MD

## 2023-10-25 NOTE — TELEPHONE ENCOUNTER
No care due was identified.  [facility] Embedded Care Due Messages. Reference number: [id number].   10/25/2023 11:20:37 AM CDT

## 2023-11-17 RX ORDER — LISINOPRIL AND HYDROCHLOROTHIAZIDE 20; 25 MG/1; MG/1
1 TABLET ORAL
Qty: 90 TABLET | Refills: 0 | Status: SHIPPED | OUTPATIENT
Start: 2023-11-17 | End: 2024-02-15

## 2023-11-17 NOTE — TELEPHONE ENCOUNTER
Refill Decision Note   Gopi Garcia  is requesting a refill authorization.  Brief Assessment and Rationale for Refill:  Approve     Medication Therapy Plan:         Comments:     Note composed:8:12 AM 11/17/2023

## 2023-11-17 NOTE — TELEPHONE ENCOUNTER
No care due was identified.  [facility] Embedded Care Due Messages. Reference number: [id number].   11/17/2023 1:06:08 AM CST

## 2023-12-02 DIAGNOSIS — F51.02 INSOMNIA DUE TO PSYCHOLOGICAL STRESS: ICD-10-CM

## 2023-12-04 DIAGNOSIS — E11.69 HYPERLIPIDEMIA ASSOCIATED WITH TYPE 2 DIABETES MELLITUS: ICD-10-CM

## 2023-12-04 DIAGNOSIS — E11.9 TYPE 2 DIABETES MELLITUS WITHOUT COMPLICATION, WITHOUT LONG-TERM CURRENT USE OF INSULIN: ICD-10-CM

## 2023-12-04 DIAGNOSIS — E11.59 HYPERTENSION ASSOCIATED WITH DIABETES: ICD-10-CM

## 2023-12-04 DIAGNOSIS — I25.10 MILD CAD: ICD-10-CM

## 2023-12-04 DIAGNOSIS — G47.33 OBSTRUCTIVE SLEEP APNEA SYNDROME: ICD-10-CM

## 2023-12-04 DIAGNOSIS — E66.01 SEVERE OBESITY WITH BODY MASS INDEX (BMI) OF 35.0 TO 39.9 WITH COMORBIDITY: ICD-10-CM

## 2023-12-04 DIAGNOSIS — I25.2 MYOCARDIAL INFARCT, OLD: ICD-10-CM

## 2023-12-04 DIAGNOSIS — E78.5 HYPERLIPIDEMIA ASSOCIATED WITH TYPE 2 DIABETES MELLITUS: ICD-10-CM

## 2023-12-04 DIAGNOSIS — I15.2 HYPERTENSION ASSOCIATED WITH DIABETES: ICD-10-CM

## 2023-12-04 RX ORDER — AMLODIPINE BESYLATE 5 MG/1
5 TABLET ORAL DAILY
Qty: 30 TABLET | Refills: 1 | Status: SHIPPED | OUTPATIENT
Start: 2023-12-04 | End: 2024-01-10

## 2023-12-04 RX ORDER — TRAZODONE HYDROCHLORIDE 50 MG/1
50 TABLET ORAL NIGHTLY
Qty: 30 TABLET | Refills: 3 | Status: SHIPPED | OUTPATIENT
Start: 2023-12-04 | End: 2024-04-03

## 2023-12-06 ENCOUNTER — LAB VISIT (OUTPATIENT)
Dept: LAB | Facility: HOSPITAL | Age: 63
End: 2023-12-06
Attending: FAMILY MEDICINE
Payer: OTHER GOVERNMENT

## 2023-12-06 DIAGNOSIS — Z79.899 ENCOUNTER FOR LONG-TERM (CURRENT) USE OF MEDICATIONS: ICD-10-CM

## 2023-12-06 DIAGNOSIS — E11.9 TYPE 2 DIABETES MELLITUS WITHOUT COMPLICATION, WITHOUT LONG-TERM CURRENT USE OF INSULIN: ICD-10-CM

## 2023-12-06 DIAGNOSIS — Z12.5 SCREENING FOR PROSTATE CANCER: ICD-10-CM

## 2023-12-06 LAB
ALBUMIN SERPL BCP-MCNC: 4.1 G/DL (ref 3.5–5.2)
ALP SERPL-CCNC: 60 U/L (ref 55–135)
ALT SERPL W/O P-5'-P-CCNC: 6 U/L (ref 10–44)
ANION GAP SERPL CALC-SCNC: 7 MMOL/L (ref 8–16)
AST SERPL-CCNC: 13 U/L (ref 10–40)
BILIRUB SERPL-MCNC: 1.4 MG/DL (ref 0.1–1)
BUN SERPL-MCNC: 12 MG/DL (ref 8–23)
CALCIUM SERPL-MCNC: 9.5 MG/DL (ref 8.7–10.5)
CHLORIDE SERPL-SCNC: 102 MMOL/L (ref 95–110)
CHOLEST SERPL-MCNC: 130 MG/DL (ref 120–199)
CHOLEST/HDLC SERPL: 2.5 {RATIO} (ref 2–5)
CO2 SERPL-SCNC: 31 MMOL/L (ref 23–29)
COMPLEXED PSA SERPL-MCNC: 0.64 NG/ML (ref 0–4)
CREAT SERPL-MCNC: 1.1 MG/DL (ref 0.5–1.4)
EST. GFR  (NO RACE VARIABLE): >60 ML/MIN/1.73 M^2
ESTIMATED AVG GLUCOSE: 126 MG/DL (ref 68–131)
GLUCOSE SERPL-MCNC: 123 MG/DL (ref 70–110)
HBA1C MFR BLD: 6 % (ref 4–5.6)
HDLC SERPL-MCNC: 51 MG/DL (ref 40–75)
HDLC SERPL: 39.2 % (ref 20–50)
LDLC SERPL CALC-MCNC: 61.8 MG/DL (ref 63–159)
NONHDLC SERPL-MCNC: 79 MG/DL
POTASSIUM SERPL-SCNC: 4 MMOL/L (ref 3.5–5.1)
PROT SERPL-MCNC: 6.6 G/DL (ref 6–8.4)
SODIUM SERPL-SCNC: 140 MMOL/L (ref 136–145)
TRIGL SERPL-MCNC: 86 MG/DL (ref 30–150)
VIT B12 SERPL-MCNC: 272 PG/ML (ref 210–950)

## 2023-12-06 PROCEDURE — 80053 COMPREHEN METABOLIC PANEL: CPT | Performed by: FAMILY MEDICINE

## 2023-12-06 PROCEDURE — 36415 COLL VENOUS BLD VENIPUNCTURE: CPT | Mod: PO | Performed by: FAMILY MEDICINE

## 2023-12-06 PROCEDURE — 82607 VITAMIN B-12: CPT | Performed by: FAMILY MEDICINE

## 2023-12-06 PROCEDURE — 83036 HEMOGLOBIN GLYCOSYLATED A1C: CPT | Performed by: FAMILY MEDICINE

## 2023-12-06 PROCEDURE — 80061 LIPID PANEL: CPT | Performed by: FAMILY MEDICINE

## 2023-12-06 PROCEDURE — 84153 ASSAY OF PSA TOTAL: CPT | Performed by: FAMILY MEDICINE

## 2024-01-01 RX ORDER — METFORMIN HYDROCHLORIDE 500 MG/1
TABLET, EXTENDED RELEASE ORAL
Qty: 90 TABLET | Refills: 1 | Status: SHIPPED | OUTPATIENT
Start: 2024-01-01

## 2024-01-01 NOTE — TELEPHONE ENCOUNTER
No care due was identified.  [facility] Embedded Care Due Messages. Reference number: [id number].   1/01/2024 1:18:23 AM CST

## 2024-01-02 NOTE — TELEPHONE ENCOUNTER
Refill Decision Note   Gopi Garcia  is requesting a refill authorization.  Brief Assessment and Rationale for Refill:  Approve     Medication Therapy Plan:         Comments:     Note composed:10:07 PM 01/01/2024             Appointments     Last Visit   5/26/2023 Paul Spencer MD   Next Visit   Visit date not found Paul Spencer MD

## 2024-01-10 DIAGNOSIS — E11.59 HYPERTENSION ASSOCIATED WITH DIABETES: ICD-10-CM

## 2024-01-10 DIAGNOSIS — G47.33 OBSTRUCTIVE SLEEP APNEA SYNDROME: ICD-10-CM

## 2024-01-10 DIAGNOSIS — E11.69 HYPERLIPIDEMIA ASSOCIATED WITH TYPE 2 DIABETES MELLITUS: ICD-10-CM

## 2024-01-10 DIAGNOSIS — I25.10 MILD CAD: ICD-10-CM

## 2024-01-10 DIAGNOSIS — E66.01 SEVERE OBESITY WITH BODY MASS INDEX (BMI) OF 35.0 TO 39.9 WITH COMORBIDITY: ICD-10-CM

## 2024-01-10 DIAGNOSIS — E78.5 HYPERLIPIDEMIA ASSOCIATED WITH TYPE 2 DIABETES MELLITUS: ICD-10-CM

## 2024-01-10 DIAGNOSIS — E11.9 TYPE 2 DIABETES MELLITUS WITHOUT COMPLICATION, WITHOUT LONG-TERM CURRENT USE OF INSULIN: ICD-10-CM

## 2024-01-10 DIAGNOSIS — I25.2 MYOCARDIAL INFARCT, OLD: ICD-10-CM

## 2024-01-10 DIAGNOSIS — I15.2 HYPERTENSION ASSOCIATED WITH DIABETES: ICD-10-CM

## 2024-01-10 RX ORDER — AMLODIPINE BESYLATE 5 MG/1
5 TABLET ORAL
Qty: 30 TABLET | Refills: 11 | Status: SHIPPED | OUTPATIENT
Start: 2024-01-10

## 2024-01-10 RX ORDER — NITROGLYCERIN 0.4 MG/1
TABLET SUBLINGUAL
Qty: 25 TABLET | Refills: 11 | Status: SHIPPED | OUTPATIENT
Start: 2024-01-10

## 2024-01-25 RX ORDER — ESCITALOPRAM OXALATE 5 MG/1
TABLET ORAL
Refills: 0 | OUTPATIENT
Start: 2024-01-25

## 2024-01-25 NOTE — TELEPHONE ENCOUNTER
Refill Decision Note   Gopi Garcia  is requesting a refill authorization.  Brief Assessment and Rationale for Refill:  Quick Discontinue     Medication Therapy Plan:    Pharmacy is requesting new scripts for the following medications without required information, (sig/ frequency/qty/etc)      Medication Reconciliation Completed: No     Comments: Pharmacies have been requesting medications for patients without required information, (sig, frequency, qty, etc.). In addition, requests are sent for medication(s) pt. are currently not taking, and medications patients have never taken.    We have spoken to the pharmacies about these request types and advised their teams previously that we are unable to assess these New Script requests and require all details for these requests. This is a known issue and has been reported.     Note composed:8:52 AM 01/25/2024

## 2024-01-25 NOTE — TELEPHONE ENCOUNTER
No care due was identified.  [facility] Embedded Care Due Messages. Reference number: [id number].   1/25/2024 8:22:15 AM CST

## 2024-02-15 RX ORDER — LISINOPRIL AND HYDROCHLOROTHIAZIDE 20; 25 MG/1; MG/1
1 TABLET ORAL
Qty: 90 TABLET | Refills: 1 | Status: SHIPPED | OUTPATIENT
Start: 2024-02-15

## 2024-02-15 NOTE — TELEPHONE ENCOUNTER
No care due was identified.  [facility] Embedded Care Due Messages. Reference number: [id number].   2/15/2024 1:25:29 AM CST

## 2024-02-16 ENCOUNTER — TELEPHONE (OUTPATIENT)
Dept: FAMILY MEDICINE | Facility: CLINIC | Age: 64
End: 2024-02-16
Payer: OTHER GOVERNMENT

## 2024-02-16 NOTE — TELEPHONE ENCOUNTER
----- Message from Dejon Guerra sent at 2/16/2024 10:30 AM CST -----  Contact: Ms. MENJIVAR/ [facility] is calling needing an update prior authorization for additional visits faxed to [phone].

## 2024-02-16 NOTE — TELEPHONE ENCOUNTER
Refill Decision Note   Gopi Garcia  is requesting a refill authorization.  Brief Assessment and Rationale for Refill:  Approve     Medication Therapy Plan:         Comments:     Note composed:10:23 PM 02/15/2024

## 2024-03-20 RX ORDER — ESCITALOPRAM OXALATE 10 MG/1
10 TABLET ORAL
Qty: 90 TABLET | Refills: 0 | Status: SHIPPED | OUTPATIENT
Start: 2024-03-20 | End: 2024-06-05

## 2024-03-20 NOTE — TELEPHONE ENCOUNTER
Care Due:                  Date            Visit Type   Department     Provider  --------------------------------------------------------------------------------                                EP -                              PRIMARY      [facility] FAMILY  Last Visit: 05-      CARE ([facility])   MEDICINE       Paul Spencer  Next Visit: None Scheduled  None         None Found                                                            Last  Test          Frequency    Reason                     Performed    Due Date  --------------------------------------------------------------------------------    HBA1C.......  6 months...  metFORMIN................  12- 06-    [facility] Embedded Care Due Messages. Reference number: [id number].   3/20/2024 9:39:06 AM CDT

## 2024-03-20 NOTE — TELEPHONE ENCOUNTER
Provider Staff:  Action required for this patient    Requires labs      Please see care gap opportunities below in Care Due Message.    Thanks!  [facility]     Appointments      Date Provider   Last Visit   5/26/2023 Paul Spencer MD   Next Visit   Visit date not found Paul Spencer MD     Refill Decision Note   Gopi Garcia  is requesting a refill authorization.  Brief Assessment and Rationale for Refill:  Approve     Medication Therapy Plan:         Comments:     Note composed:9:46 AM 03/20/2024

## 2024-04-01 DIAGNOSIS — F51.02 INSOMNIA DUE TO PSYCHOLOGICAL STRESS: ICD-10-CM

## 2024-04-03 RX ORDER — TRAZODONE HYDROCHLORIDE 50 MG/1
50 TABLET ORAL NIGHTLY
Qty: 30 TABLET | Refills: 3 | Status: SHIPPED | OUTPATIENT
Start: 2024-04-03

## 2024-05-03 ENCOUNTER — LAB VISIT (OUTPATIENT)
Dept: LAB | Facility: HOSPITAL | Age: 64
End: 2024-05-03
Attending: FAMILY MEDICINE
Payer: OTHER GOVERNMENT

## 2024-05-03 DIAGNOSIS — E11.9 TYPE 2 DIABETES MELLITUS WITHOUT COMPLICATION, WITHOUT LONG-TERM CURRENT USE OF INSULIN: ICD-10-CM

## 2024-05-03 LAB
ESTIMATED AVG GLUCOSE: 123 MG/DL (ref 68–131)
HBA1C MFR BLD: 5.9 % (ref 4–5.6)

## 2024-05-03 PROCEDURE — 83036 HEMOGLOBIN GLYCOSYLATED A1C: CPT | Performed by: FAMILY MEDICINE

## 2024-05-03 PROCEDURE — 36415 COLL VENOUS BLD VENIPUNCTURE: CPT | Mod: PO | Performed by: FAMILY MEDICINE

## 2024-05-22 ENCOUNTER — PATIENT MESSAGE (OUTPATIENT)
Dept: SLEEP MEDICINE | Facility: CLINIC | Age: 64
End: 2024-05-22
Payer: OTHER GOVERNMENT

## 2024-05-28 ENCOUNTER — PATIENT MESSAGE (OUTPATIENT)
Dept: PULMONOLOGY | Facility: CLINIC | Age: 64
End: 2024-05-28

## 2024-05-28 ENCOUNTER — OFFICE VISIT (OUTPATIENT)
Dept: PULMONOLOGY | Facility: CLINIC | Age: 64
End: 2024-05-28
Payer: OTHER GOVERNMENT

## 2024-05-28 VITALS — HEIGHT: 75 IN | WEIGHT: 250 LBS | BODY MASS INDEX: 31.08 KG/M2

## 2024-05-28 DIAGNOSIS — G47.33 OSA (OBSTRUCTIVE SLEEP APNEA): ICD-10-CM

## 2024-05-28 DIAGNOSIS — E78.5 HYPERLIPIDEMIA ASSOCIATED WITH TYPE 2 DIABETES MELLITUS: Primary | ICD-10-CM

## 2024-05-28 DIAGNOSIS — I15.2 HYPERTENSION ASSOCIATED WITH DIABETES: ICD-10-CM

## 2024-05-28 DIAGNOSIS — E11.9 TYPE 2 DIABETES MELLITUS WITHOUT COMPLICATION, WITHOUT LONG-TERM CURRENT USE OF INSULIN: ICD-10-CM

## 2024-05-28 DIAGNOSIS — F51.02 INSOMNIA DUE TO PSYCHOLOGICAL STRESS: ICD-10-CM

## 2024-05-28 DIAGNOSIS — E66.01 SEVERE OBESITY WITH BODY MASS INDEX (BMI) OF 35.0 TO 39.9 WITH COMORBIDITY: ICD-10-CM

## 2024-05-28 DIAGNOSIS — E11.69 HYPERLIPIDEMIA ASSOCIATED WITH TYPE 2 DIABETES MELLITUS: Primary | ICD-10-CM

## 2024-05-28 DIAGNOSIS — E11.59 HYPERTENSION ASSOCIATED WITH DIABETES: ICD-10-CM

## 2024-05-28 PROCEDURE — 99214 OFFICE O/P EST MOD 30 MIN: CPT | Mod: 95,,, | Performed by: INTERNAL MEDICINE

## 2024-05-28 RX ORDER — ZOLPIDEM TARTRATE 5 MG/1
5 TABLET ORAL NIGHTLY
Qty: 30 TABLET | Refills: 3 | Status: SHIPPED | OUTPATIENT
Start: 2024-05-28 | End: 2024-09-25

## 2024-05-28 NOTE — PROGRESS NOTES
The patient location is: [facility]  The chief complaint leading to consultation is:   Chief Complaint   Patient presents with    Apnea    Insomnia         Visit type: audiovisual    Face to Face time with patient: 5 mins  30 minutes of total time spent on the encounter, which includes face to face time and non-face to face time preparing to see the patient (eg, review of tests), Obtaining and/or reviewing separately obtained history, Documenting clinical information in the electronic or other health record, Independently interpreting results (not separately reported) and communicating results to the patient/family/caregiver, or Care coordination (not separately reported).         Each patient to whom he or she provides medical services by telemedicine is:  (1) informed of the relationship between the physician and patient and the respective role of any other health care provider with respect to management of the patient; and (2) notified that he or she may decline to receive medical services by telemedicine and may withdraw from such care at any time.    Notes:                                           Pulmonary Outpatient  Visit     Subjective:       Patient ID: Gopi Garcia is a 63 y.o. male.    Social History     Tobacco Use   Smoking Status Former    Current packs/day: 0.00    Average packs/day: 1 pack/day for 30.0 years (30.0 ttl pk-yrs)    Types: Cigars, Cigarettes    Start date: 10/17/1977    Quit date: 10/17/2007    Years since quittin.6   Smokeless Tobacco Never            Chief Complaint: Apnea and Insomnia      Gopi Garcia is 62 y.o.  Referred by Paul Spencer MD  Last seen 2019: DASHA on CPAP  Prior records reveiwed  Last LDCT: emphysematous changes  30 Pack year smoking Hx quit   Smoke Cigars  [facility] PHD in education  Stress: trouble with sleep  Recently given Trazadone: caused nasal congestion  Asks for ambien worked well on past  CPAP data > 4 hrs was  96.75  AHI 1.3            The diagnostic polysomnography revealed a severe obstructive sleep apnea / hypopnea syndrome (A + H Index = 53.5 events  / hr asleep with 23.4 arousals / hr asleep for the study, and a mean SpO2 value of 92.7 %, moderate, minimum oxygen  saturation during sleep of 83.0 %, and waking baseline SpO2 = 97 %. There were no RERAs (respiratory effort- related  arousals). Sporadic, mild to moderately loud snoring was noted.  2. CPAP was initiated at 1:19 am. The titration polysomnography revealed that though 8 cm, 9 cm and 10 cm were each  completely tested and ineffective (AHI= 14.3, 14.4 and 21.9 respectively), and each had a high rate of central sleep apneas  predominating; Note too that central and mixed apneas did occur pre - CPAP, but did not predominate. The overall A + H  Index was 15.4 / hr asleep, with only 0.5 respiratory event - related arousals / hr asleep for the titration trial (there were no  RERAs). The mean SpO2 value was 94.2 % throughout the study, with a minimum oxygen saturation during sleep of 88.0 %  (waking baseline SpO2 was 96 %). Snoring was soft to absent at 10 cm, but was not eliminated. The patient's own BrightFunnel nasal CPAP mask was used and was well - tolerated. Please see PAP trial outcomes table, below.      07/27/2023  Followup visit  Concern  Recently started Ambien: gets to sleep  Duration not satisfactory  Bed time: 10-11 pm  Wake time: 6-7 am  On Ambien 5 mg  Fatigue during day  Using CPAP    05/28/2024  Followup  Insomnia  On Trazodone and Ambien   Has been off ambien 2 months  On CPAP  Bed time: 10-11 pm: up at 02:30 am and cannot get back to sleep  Wake time: 4-5 am  Has daytime sleepiness, takes nap  In school pursuing doctorate              Review of Systems   All other systems reviewed and are negative.      Outpatient Encounter Medications as of 5/28/2024   Medication Sig Dispense Refill    amLODIPine (NORVASC) 5 MG tablet TAKE 1 TABLET DAILY 30  tablet 11    aspirin (ECOTRIN) 81 MG EC tablet Take 81 mg by mouth once daily.      blood sugar diagnostic Strp Test glucose 1 x daily 50 strip prn    ciclopirox (PENLAC) 8 % Soln Apply topically nightly. Apply to adjacent skin and affected nails daily.  Remove with alcohol every 7 days. 6.6 mL 11    EScitalopram oxalate (LEXAPRO) 10 MG tablet TAKE 1 TABLET DAILY 90 tablet 0    fluticasone propionate (FLONASE) 50 mcg/actuation nasal spray 1 spray (50 mcg total) by Each Nostril route once daily. 54 mL 3    lancets Misc As directed 100 each prn    lisinopriL-hydrochlorothiazide (PRINZIDE,ZESTORETIC) 20-25 mg Tab TAKE 1 TABLET DAILY 90 tablet 1    metFORMIN (GLUCOPHAGE-XR) 500 MG ER 24hr tablet TAKE 1 TABLET DAILY 90 tablet 1    nitroGLYCERIN (NITROSTAT) 0.4 MG SL tablet DISSOLVE 1 TABLET UNDER THE TONGUE EVERY 5 MINUTES AS NEEDED FOR CHEST PAIN. MAXIMUM 3 TABLETS. DO NOT TAKE IF USING VIAGRA 25 tablet 11    omega-3 fatty acids/fish oil (FISH OIL-OMEGA-3 FATTY ACIDS) 300-1,000 mg capsule Take by mouth once daily.      oxycodone-acetaminophen (PERCOCET)  mg per tablet Take 1 tablet by mouth 4 (four) times daily.  0    rosuvastatin (CRESTOR) 20 MG tablet TAKE 1 TABLET DAILY 90 tablet 3    sildenafil (VIAGRA) 100 MG tablet Take 1 tablet (100 mg total) by mouth daily as needed for Erectile Dysfunction (Don't take if using any nitroglycerin). 18 tablet 3    traZODone (DESYREL) 50 MG tablet TAKE 1 TABLET BY MOUTH NIGHTLY AS NEEDED FOR INSOMNIA. 30 tablet 3    blood-glucose meter (FREESTYLE FREEDOM LITE) kit Use as instructed 1 each 11    ketoconazole (NIZORAL) 2 % cream Apply topically once daily. 60 g 1    zolpidem (AMBIEN) 5 MG Tab Take 1 tablet (5 mg total) by mouth every evening. 30 tablet 3    [DISCONTINUED] zolpidem (AMBIEN) 5 MG Tab Take 1 tablet (5 mg total) by mouth every evening. 30 tablet 3     No facility-administered encounter medications on file as of 5/28/2024.       The following portions of the  patient's history were reviewed and updated as appropriate: He  has a past medical history of Anxiety, AR (allergic rhinitis), CAD (coronary artery disease), Carrier of hemochromatosis HFE gene mutation, Chronic low back pain, Colon polyp (2010), Coronary artery disease, Depression, Diverticulosis, ED (erectile dysfunction), Fatty liver, Hepatomegaly, HTN (hypertension), Hyperlipidemia, Kidney stone (2009), Myocardial infarct, old, Pulmonary nodule, left (01/02/2020), Sleep apnea, and Type 2 diabetes mellitus without complication, without long-term current use of insulin (06/08/2021).  He does not have any pertinent problems on file.  He  has a past surgical history that includes Inguinal hernia repair (Right, 2011); Vasectomy (2005); Cardiac catheterization (2006); Colonoscopy (49 years old); and Colonoscopy (N/A, 6/2/2020).  His family history includes Alzheimer's disease in his mother; Colon polyps in his brother; Heart attack in his paternal grandfather; Hemochromatosis in his brother and brother; Lung cancer in his father; Prostate cancer in his brother.  He  reports that he quit smoking about 16 years ago. His smoking use included cigars and cigarettes. He started smoking about 46 years ago. He has a 30 pack-year smoking history. He has never used smokeless tobacco. He reports current alcohol use. He reports that he does not use drugs.  He has a current medication list which includes the following prescription(s): amlodipine, aspirin, blood sugar diagnostic, ciclopirox, escitalopram oxalate, fluticasone propionate, lancets, lisinopril-hydrochlorothiazide, metformin, nitroglycerin, fish oil-omega-3 fatty acids, oxycodone-acetaminophen, rosuvastatin, sildenafil, trazodone, blood-glucose meter, ketoconazole, and zolpidem.  Current Outpatient Medications on File Prior to Visit   Medication Sig Dispense Refill    amLODIPine (NORVASC) 5 MG tablet TAKE 1 TABLET DAILY 30 tablet 11    aspirin (ECOTRIN) 81 MG EC tablet  Take 81 mg by mouth once daily.      blood sugar diagnostic Strp Test glucose 1 x daily 50 strip prn    ciclopirox (PENLAC) 8 % Soln Apply topically nightly. Apply to adjacent skin and affected nails daily.  Remove with alcohol every 7 days. 6.6 mL 11    EScitalopram oxalate (LEXAPRO) 10 MG tablet TAKE 1 TABLET DAILY 90 tablet 0    fluticasone propionate (FLONASE) 50 mcg/actuation nasal spray 1 spray (50 mcg total) by Each Nostril route once daily. 54 mL 3    lancets Misc As directed 100 each prn    lisinopriL-hydrochlorothiazide (PRINZIDE,ZESTORETIC) 20-25 mg Tab TAKE 1 TABLET DAILY 90 tablet 1    metFORMIN (GLUCOPHAGE-XR) 500 MG ER 24hr tablet TAKE 1 TABLET DAILY 90 tablet 1    nitroGLYCERIN (NITROSTAT) 0.4 MG SL tablet DISSOLVE 1 TABLET UNDER THE TONGUE EVERY 5 MINUTES AS NEEDED FOR CHEST PAIN. MAXIMUM 3 TABLETS. DO NOT TAKE IF USING VIAGRA 25 tablet 11    omega-3 fatty acids/fish oil (FISH OIL-OMEGA-3 FATTY ACIDS) 300-1,000 mg capsule Take by mouth once daily.      oxycodone-acetaminophen (PERCOCET)  mg per tablet Take 1 tablet by mouth 4 (four) times daily.  0    rosuvastatin (CRESTOR) 20 MG tablet TAKE 1 TABLET DAILY 90 tablet 3    sildenafil (VIAGRA) 100 MG tablet Take 1 tablet (100 mg total) by mouth daily as needed for Erectile Dysfunction (Don't take if using any nitroglycerin). 18 tablet 3    traZODone (DESYREL) 50 MG tablet TAKE 1 TABLET BY MOUTH NIGHTLY AS NEEDED FOR INSOMNIA. 30 tablet 3    blood-glucose meter (FREESTYLE FREEDOM LITE) kit Use as instructed 1 each 11    ketoconazole (NIZORAL) 2 % cream Apply topically once daily. 60 g 1    [DISCONTINUED] zolpidem (AMBIEN) 5 MG Tab Take 1 tablet (5 mg total) by mouth every evening. 30 tablet 3     No current facility-administered medications on file prior to visit.     He has No Known Allergies..      BP Readings from Last 3 Encounters:   06/01/23 122/80   05/26/23 128/76   04/18/23 131/86     Snoring / Sleep:          MMRC Dyspnea Scale (4 is  "worst)     [] MMRC 0: Dyspneic on strenuous excercise (0 points)    [] MMRC 1: Dyspneic on walking a slight hill (0 points)    [] MMRC 2: Dyspneic on walking level ground; must stop occasionally due to breathlessness (1 point)    [] MMRC 3: Must stop for breathlessness after walking 100 yards or after a few minutes (2 points)    [] MMRC 4: Cannot leave house; breathless on dressing/undressing (3 points)            5/28/2024     8:39 AM   EPWORTH SLEEPINESS SCALE   Sitting and reading 2   Watching TV 2   Sitting, inactive in a public place (e.g. a theatre or a meeting) 0   As a passenger in a car for an hour without a break 2   Lying down to rest in the afternoon when circumstances permit 3   Sitting and talking to someone 0   Sitting quietly after a lunch without alcohol 0   In a car, while stopped for a few minutes in traffic 0   Total score 9              Objective:     Vital Signs (Most Recent)  Height and Weight  Height: 6' 3" (190.5 cm)  Weight: 113.4 kg (250 lb)  BSA (Calculated - sq m): 2.45 sq meters  BMI (Calculated): 31.2  Weight in (lb) to have BMI = 25: 199.6]  Wt Readings from Last 2 Encounters:   05/28/24 113.4 kg (250 lb)   07/27/23 120.2 kg (265 lb)       Physical Exam  Vitals and nursing note reviewed.   HENT:      Head: Normocephalic.   Eyes:      Pupils: Pupils are equal, round, and reactive to light.   Neurological:      Mental Status: He is alert and oriented to person, place, and time.          Laboratory  Lab Results   Component Value Date    WBC 7.65 08/26/2022    RBC 5.25 08/26/2022    HGB 16.6 08/26/2022    HCT 47.6 08/26/2022    MCV 91 08/26/2022    MCH 31.6 (H) 08/26/2022    MCHC 34.9 08/26/2022    RDW 12.2 08/26/2022     08/26/2022    MPV 10.0 08/26/2022    GRAN 4.4 08/26/2022    GRAN 56.8 08/26/2022    LYMPH 2.4 08/26/2022    LYMPH 31.5 08/26/2022    MONO 0.6 08/26/2022    MONO 7.6 08/26/2022    EOS 0.2 08/26/2022    BASO 0.06 08/26/2022    EOSINOPHIL 3.0 08/26/2022    BASOPHIL " "0.8 08/26/2022       [facility]  Lab Results   Component Value Date     12/06/2023    K 4.0 12/06/2023     12/06/2023    CO2 31 (H) 12/06/2023    BUN 12 12/06/2023    CREATININE 1.1 12/06/2023    CALCIUM 9.5 12/06/2023    ANIONGAP 7 (L) 12/06/2023    ESTGFRAFRICA >60.0 12/09/2021    EGFRNONAA >60.0 12/09/2021    AST 13 12/06/2023    ALT 6 (L) 12/06/2023    PROT 6.6 12/06/2023          No results found for: "IGE"     No results found for: "ASPERGILLUS"  No results found for: "AFUMIGATUSCL"     No results found for: "ACE"     Diagnostic Results:  I have personally reviewed today the following studies:    CT Chest Lung Screening Low Dose  Narrative: EXAMINATION:  CT CHEST LUNG SCREENING LOW DOSE    CLINICAL HISTORY:  Lung cancer screening, >= 30 pk-yr current smoker (Age 55-80y); Nicotine dependence, unspecified, uncomplicated    TECHNIQUE:  CT of the thorax was performed with low dose, lung screening protocol.  No contrast was administered.  Sagittal and coronal reconstructions were obtained.    COMPARISON:  09/07/2022    FINDINGS:  Lungs: Lungs are symmetrically expanded and demonstrate centrilobular emphysema with an upper lobe predominance.  Stable 3 mm solid noncalcified nodule in the superior segment of the left lower lobe (series 4, image 207).  Multiple stable scattered calcified granulomas are noted.  No discrete new or enlarging pulmonary nodule.  Minimal bibasilar atelectasis.  No consolidation.    Pleura:   No pleural effusion or pneumothorax..    Heart and pericardium: Normal size without effusion.    Aorta and vasculature: Atherosclerosis including coronary arteries.    Chest wall and skeletal structures: Unremarkable except age-appropriate degenerative changes.    Upper abdomen: No acute abnormality.  Impression: Lung-RADS Category:  2 - Benign Appearance or Behavior - continue annual screening with LDCT in 12 months.    Clinically or potentially clinically significant non lung cancer finding:  " None.    Prior Lung Cancer Modifier:  No history of prior lung cancer.    Electronically signed by: Cliff Brandon  Date:    09/22/2023  Time:    12:32     Care Team  [facility]. [address], [place] [address] [phone]  Compliance Information 2/28/2024 - 5/27/2024  Compliance Summary  2/28/2024 - 5/27/2024 (90 days)  Days with Device Usage 90 days  Days without Device Usage 0 days  Percent Days with Device Usage 100.0%  Cumulative Usage 28 days 5 hrs. 39 mins. 41 secs.  Maximum Usage (1 Day) 10 hrs. 38 mins. 40 secs.  Average Usage (All Days) 7 hrs. 31 mins. 46 secs.  Average Usage (Days Used) 7 hrs. 31 mins. 46 secs.  Minimum Usage (1 Day) 1 hrs. 54 mins. 47 secs.  Percent of Days with Usage >= 4 Hours 97.8%  Percent of Days with Usage < 4 Hours 2.2%  Date Range  Total Blower Time 29 days 12 hrs. 9 mins.  Average AHI 1.5  Auto-CPAP Summary  Auto-CPAP Mean Pressure 6.8 cmH2O  Auto-CPAP Peak Average Pressure 9.2 cmH2O  Device Pressure <= 90% of Time 8.3 cmH2O  Average Time in Large Leak Per Day 26 secs.  Printed By:  Assessment/Plan:     Problem List Items Addressed This Visit       Hypertension associated with diabetes    Hyperlipidemia associated with type 2 diabetes mellitus - Primary    Severe obesity with body mass index (BMI) of 35.0 to 39.9 with comorbidity    Type 2 diabetes mellitus without complication, without long-term current use of insulin    DASHA (obstructive sleep apnea)         5/28/2024     8:39 AM   EPWORTH SLEEPINESS SCALE   Sitting and reading 2   Watching TV 2   Sitting, inactive in a public place (e.g. a theatre or a meeting) 0   As a passenger in a car for an hour without a break 2   Lying down to rest in the afternoon when circumstances permit 3   Sitting and talking to someone 0   Sitting quietly after a lunch without alcohol 0   In a car, while stopped for a few minutes in traffic 0   Total score 9        Data 02/28/2024 to 05/27/2024  Usage > 4 hrs  was 97.8%  AHI 1.5    USING AND BENEFITS         Insomnia due to psychological stress     Request for ambien refill    Requested Prescriptions     Signed Prescriptions Disp Refills    zolpidem (AMBIEN) 5 MG Tab 30 tablet 3     Sig: Take 1 tablet (5 mg total) by mouth every evening.             Relevant Medications    zolpidem (AMBIEN) 5 MG Tab      Meds refill  Ambien worked well         The patient was given open opportunity to ask questions and/or express concerns about treatment plan.   All questions/concerns were discussed.     Follow up in about 4 months (around 9/28/2024), or refill ambien, sleep diary, CBTI.    This note was prepared using voice recognition system and is likely to have sound alike errors that may have been overlooked even after proof reading.  Please call me with any questions    Discussed diagnosis, its evaluation, treatment and usual course. All questions answered.    Thank you for the courtesy of participating in the care of this patient    Philip Hays MD      Personal Diagnostic Review  [x]  LDCT    [x]  [place]    [x]  Download    []  6MWD    []  LABS    []  CHEST CT    []  PET CT    []  Biopsy results

## 2024-05-28 NOTE — ASSESSMENT & PLAN NOTE
5/28/2024     8:39 AM   EPWORTH SLEEPINESS SCALE   Sitting and reading 2   Watching TV 2   Sitting, inactive in a public place (e.g. a theatre or a meeting) 0   As a passenger in a car for an hour without a break 2   Lying down to rest in the afternoon when circumstances permit 3   Sitting and talking to someone 0   Sitting quietly after a lunch without alcohol 0   In a car, while stopped for a few minutes in traffic 0   Total score 9        Data 02/28/2024 to 05/27/2024  Usage > 4 hrs was 97.8%  AHI 1.5    USING AND BENEFITS

## 2024-05-28 NOTE — ASSESSMENT & PLAN NOTE
Request for ambien refill    Requested Prescriptions     Signed Prescriptions Disp Refills    zolpidem (AMBIEN) 5 MG Tab 30 tablet 3     Sig: Take 1 tablet (5 mg total) by mouth every evening.

## 2024-06-27 RX ORDER — METFORMIN HYDROCHLORIDE 500 MG/1
TABLET, EXTENDED RELEASE ORAL
Qty: 90 TABLET | Refills: 0 | Status: SHIPPED | OUTPATIENT
Start: 2024-06-27

## 2024-06-27 NOTE — TELEPHONE ENCOUNTER
Refill Decision Note   Gopi Garcia  is requesting a refill authorization.  Brief Assessment and Rationale for Refill:  Approve     Medication Therapy Plan:        Comments:     Note composed:6:08 AM 06/27/2024

## 2024-06-27 NOTE — TELEPHONE ENCOUNTER
No care due was identified.  [facility] Embedded Care Due Messages. Reference number: [id number].   6/27/2024 2:35:45 AM CDT

## 2024-07-18 DIAGNOSIS — F51.02 INSOMNIA DUE TO PSYCHOLOGICAL STRESS: ICD-10-CM

## 2024-07-18 RX ORDER — TRAZODONE HYDROCHLORIDE 50 MG/1
50 TABLET ORAL NIGHTLY
Qty: 30 TABLET | Refills: 3 | Status: SHIPPED | OUTPATIENT
Start: 2024-07-18

## 2024-07-23 ENCOUNTER — OFFICE VISIT (OUTPATIENT)
Dept: FAMILY MEDICINE | Facility: CLINIC | Age: 64
End: 2024-07-23
Payer: OTHER GOVERNMENT

## 2024-07-23 VITALS
SYSTOLIC BLOOD PRESSURE: 131 MMHG | HEIGHT: 75 IN | TEMPERATURE: 98 F | HEART RATE: 79 BPM | DIASTOLIC BLOOD PRESSURE: 80 MMHG | WEIGHT: 258 LBS | BODY MASS INDEX: 32.08 KG/M2

## 2024-07-23 DIAGNOSIS — E11.9 TYPE 2 DIABETES MELLITUS WITHOUT COMPLICATION, WITHOUT LONG-TERM CURRENT USE OF INSULIN: ICD-10-CM

## 2024-07-23 DIAGNOSIS — Z14.8 CARRIER OF HEMOCHROMATOSIS HFE GENE MUTATION: ICD-10-CM

## 2024-07-23 DIAGNOSIS — I15.2 HYPERTENSION ASSOCIATED WITH DIABETES: ICD-10-CM

## 2024-07-23 DIAGNOSIS — E11.59 HYPERTENSION ASSOCIATED WITH DIABETES: ICD-10-CM

## 2024-07-23 DIAGNOSIS — G47.33 OSA (OBSTRUCTIVE SLEEP APNEA): ICD-10-CM

## 2024-07-23 DIAGNOSIS — E78.5 HYPERLIPIDEMIA ASSOCIATED WITH TYPE 2 DIABETES MELLITUS: ICD-10-CM

## 2024-07-23 DIAGNOSIS — F33.0 MILD EPISODE OF RECURRENT MAJOR DEPRESSIVE DISORDER: ICD-10-CM

## 2024-07-23 DIAGNOSIS — F41.9 ANXIETY: ICD-10-CM

## 2024-07-23 DIAGNOSIS — I25.10 MILD CAD: ICD-10-CM

## 2024-07-23 DIAGNOSIS — J43.2 CENTRILOBULAR EMPHYSEMA: ICD-10-CM

## 2024-07-23 DIAGNOSIS — L08.9 LOCAL SKIN INFECTION: ICD-10-CM

## 2024-07-23 DIAGNOSIS — E66.9 CLASS 1 OBESITY WITH SERIOUS COMORBIDITY AND BODY MASS INDEX (BMI) OF 32.0 TO 32.9 IN ADULT, UNSPECIFIED OBESITY TYPE: ICD-10-CM

## 2024-07-23 DIAGNOSIS — Z12.2 ENCOUNTER FOR SCREENING FOR LUNG CANCER: ICD-10-CM

## 2024-07-23 DIAGNOSIS — Z00.00 ROUTINE HISTORY AND PHYSICAL EXAMINATION OF ADULT: Primary | ICD-10-CM

## 2024-07-23 DIAGNOSIS — E11.69 HYPERLIPIDEMIA ASSOCIATED WITH TYPE 2 DIABETES MELLITUS: ICD-10-CM

## 2024-07-23 PROCEDURE — 99396 PREV VISIT EST AGE 40-64: CPT | Mod: S$PBB,,, | Performed by: FAMILY MEDICINE

## 2024-07-23 PROCEDURE — 90471 IMMUNIZATION ADMIN: CPT | Mod: PBBFAC,PO

## 2024-07-23 PROCEDURE — 90750 HZV VACC RECOMBINANT IM: CPT | Mod: PBBFAC,PO

## 2024-07-23 PROCEDURE — 99999 PR PBB SHADOW E&M-EST. PATIENT-LVL V: CPT | Mod: PBBFAC,,, | Performed by: FAMILY MEDICINE

## 2024-07-23 PROCEDURE — 99999PBSHW PR PBB SHADOW TECHNICAL ONLY FILED TO HB: Mod: PBBFAC,,,

## 2024-07-23 PROCEDURE — 99215 OFFICE O/P EST HI 40 MIN: CPT | Mod: PBBFAC,PO,25 | Performed by: FAMILY MEDICINE

## 2024-07-23 RX ADMIN — Medication 0.5 ML: at 03:07

## 2024-07-23 NOTE — PROGRESS NOTES
Presents physical exam.  Complains of a small nodule minimally tender posterior neck noted over the past month.  No drainage.  No fever.  Hypertension controlled.  Diabetes has been controlled.  Hyperlipidemia on statin.  Previous history of my without recurrent angina.  Sleep apnea stable with CPAP.  Depression doing well with current medication.  Obesity has lost some weight.  Emphysema noted on previous imaging for lung cancer screening.  He is not having a lot of symptoms he is interested in continued screening.  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      Gopi was seen today for annual exam.    Diagnoses and all orders for this visit:    Routine history and physical examination of adult    Local skin infection    Hypertension associated with diabetes    Hyperlipidemia associated with type 2 diabetes mellitus    Type 2 diabetes mellitus without complication, without long-term current use of insulin  -     Ambulatory referral/consult to Optometry; Future    DASHA (obstructive sleep apnea)    Mild episode of recurrent major depressive disorder    Anxiety    Mild CAD    Class 1 obesity with serious comorbidity and body mass index (BMI) of 32.0 to 32.9 in adult, unspecified obesity type    Centrilobular emphysema    Carrier of hemochromatosis HFE gene mutation  -     CBC Auto Differential; Future  -     Ferritin; Future  -     Iron and TIBC; Future    Encounter for screening for lung cancer  -     CT Chest Lung Screening Low Dose; Future    Other orders  -     varicella zoster (Shingrix) IM vaccine (>/= 49 yo)      Continue current medications.  Follow-up laboratory as already scheduled regarding his diabetes.          Diabetes Management Status    Statin: Taking  ACE/ARB: Taking    Screening or Prevention Patient's value Goal Complete/Controlled?   HgA1C Testing and Control   Lab Results   Component Value Date    HGBA1C 5.9 (H) 05/03/2024      Annually/Less than 8% Yes   Lipid profile : 12/06/2023  Annually Yes   LDL control Lab Results   Component Value Date    LDLCALC 61.8 (L) 12/06/2023    Annually/Less than 100 mg/dl  Yes   Nephropathy screening Lab Results   Component Value Date    LABMICR 7.0 12/06/2023     Lab Results   Component Value Date    PROTEINUA Negative 08/26/2022    Annually Yes   Blood pressure BP Readings from Last 1 Encounters:   07/23/24 131/80    Less than 140/90 Yes   Dilated retinal exam : 09/08/2021 Annually Yes   Foot exam   : 07/23/2024 Annually Yes       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    Centrilobular emphysema 07/23/2024    Chronic low back pain     Colon polyp 2010    told to repeat 5 yrs    Coronary artery disease     no stents    Depression     Diverticulosis     ED (erectile dysfunction)     Fatty liver     Hepatomegaly     HTN (hypertension)     Hyperlipidemia     Kidney stone 2009    Myocardial infarct, old     Pulmonary nodule, left 01/02/2020    Repeat CT January 2021    Sleep apnea     Type 2 diabetes mellitus without complication, without long-term current use of insulin 06/08/2021     Past Surgical History:   Procedure Laterality Date    CARDIAC CATHETERIZATION  2006    COLONOSCOPY  49 years old    colon polyps removed per patient report    COLONOSCOPY N/A 6/2/2020    Procedure: COLONOSCOPY;  Surgeon: Zachariah Dowell Jr., MD;  Location: [facility];  Service: Endoscopy;  Laterality: N/A;    INGUINAL HERNIA REPAIR Right 2011    VASECTOMY  2005     Review of patient's allergies indicates:  No Known Allergies  Current Outpatient Medications on File Prior to Visit   Medication Sig Dispense Refill    amLODIPine (NORVASC) 5 MG tablet TAKE 1 TABLET DAILY 30 tablet 11    aspirin (ECOTRIN) 81 MG EC tablet Take 81 mg by mouth once daily.      ciclopirox (PENLAC) 8 % Soln Apply topically nightly. Apply to  adjacent skin and affected nails daily.  Remove with alcohol every 7 days. 6.6 mL 11    EScitalopram oxalate (LEXAPRO) 10 MG tablet TAKE 1 TABLET DAILY 90 tablet 0    fluticasone propionate (FLONASE) 50 mcg/actuation nasal spray 1 spray (50 mcg total) by Each Nostril route once daily. 54 mL 3    lancets Misc As directed 100 each prn    lisinopriL-hydrochlorothiazide (PRINZIDE,ZESTORETIC) 20-25 mg Tab TAKE 1 TABLET DAILY 90 tablet 1    metFORMIN (GLUCOPHAGE-XR) 500 MG ER 24hr tablet TAKE 1 TABLET DAILY 90 tablet 0    nitroGLYCERIN (NITROSTAT) 0.4 MG SL tablet DISSOLVE 1 TABLET UNDER THE TONGUE EVERY 5 MINUTES AS NEEDED FOR CHEST PAIN. MAXIMUM 3 TABLETS. DO NOT TAKE IF USING VIAGRA 25 tablet 11    omega-3 fatty acids/fish oil (FISH OIL-OMEGA-3 FATTY ACIDS) 300-1,000 mg capsule Take by mouth once daily.      oxycodone-acetaminophen (PERCOCET)  mg per tablet Take 1 tablet by mouth 4 (four) times daily.  0    rosuvastatin (CRESTOR) 20 MG tablet TAKE 1 TABLET DAILY 90 tablet 3    sildenafil (VIAGRA) 100 MG tablet Take 1 tablet (100 mg total) by mouth daily as needed for Erectile Dysfunction (Don't take if using any nitroglycerin). 18 tablet 3    traZODone (DESYREL) 50 MG tablet TAKE 1 TABLET BY MOUTH NIGHTLY AS NEEDED FOR INSOMNIA. 30 tablet 3    zolpidem (AMBIEN) 5 MG Tab Take 1 tablet (5 mg total) by mouth every evening. 30 tablet 3    blood sugar diagnostic Strp Test glucose 1 x daily 50 strip prn    blood-glucose meter (FREESTYLE FREEDOM LITE) kit Use as instructed 1 each 11    ketoconazole (NIZORAL) 2 % cream Apply topically once daily. 60 g 1     No current facility-administered medications on file prior to visit.     Social History     Socioeconomic History    Marital status:    Tobacco Use    Smoking status: Former     Average packs/day: 1 pack/day for 30.0 years (30.0 ttl pk-yrs)     Types: Cigars, Cigarettes     Start date: 10/17/1977     Quit date: 10/17/2007     Years since quittin.7     "Smokeless tobacco: Never   Substance and Sexual Activity    Alcohol use: Yes     Alcohol/week: 0.0 - 1.0 standard drinks of alcohol    Drug use: No    Sexual activity: Yes     Family History   Problem Relation Name Age of Onset    Lung cancer Father      Alzheimer's disease Mother      Heart attack Paternal Grandfather      Prostate cancer Brother      Colon polyps Brother      Hemochromatosis Brother      Hemochromatosis Brother      Colon cancer Neg Hx      Crohn's disease Neg Hx      Ulcerative colitis Neg Hx      Stomach cancer Neg Hx      Esophageal cancer Neg Hx      Glaucoma Neg Hx      Macular degeneration Neg Hx      Retinal detachment Neg Hx             ROS:  GENERAL: No fever, chills,  or significant weight changes.   CARDIOVASCULAR: Denies chest pain, PND, orthopnea or reduced exercise tolerance.  ABDOMEN: Appetite fine. Denies diarrhea, abdominal pain, hematemesis or blood in stool.  URINARY: No flank pain, dysuria or hematuria.      OBJECTIVE:   Vitals:    07/23/24 1505   BP: 131/80   Pulse: 79   Temp: 97.9 °F (36.6 °C)   TempSrc: Temporal   Weight: 117 kg (258 lb)   Height: 6' 3" (1.905 m)     Wt Readings from Last 3 Encounters:   07/23/24 117 kg (258 lb)   05/28/24 113.4 kg (250 lb)   07/27/23 120.2 kg (265 lb)       APPEARANCE: Well nourished, well developed, in no acute distress.   HEAD: Normocephalic. Atraumatic. No sinus tenderness.   EYES:   Right eye: Pupil reactive. Conjunctiva clear.   Left eye: Pupil reactive. Conjunctiva clear.   Both fundi: Grossly normal to nondilated exam. EOMI.   EARS: TM's intact. Light reflex normal. No retraction or perforation.   NOSE: clear.   MOUTH & THROAT: No pharyngeal erythema or exudate. No lesions.   NECK: No bruits. No JVD. No cervical lymphadenopathy. No thyromegaly.   CHEST: Breath sounds clear bilaterally. Normal respiratory effort   CARDIOVASCULAR: Normal rate. Regular rhythm. No murmurs. No rub. No gallops.   ABDOMEN: Bowel sounds normal. Soft. No " tenderness. No organomegaly.   PERIPHERAL VASCULAR: No cyanosis. No clubbing. No edema.   NEUROLOGIC: No focal findings.    Feet:Sensation in the feet intact to monofilament testing.   No significant foot lesions.Pulses palpable.  MENTAL STATUS: Alert. Oriented x 3.

## 2024-07-26 ENCOUNTER — PATIENT MESSAGE (OUTPATIENT)
Dept: FAMILY MEDICINE | Facility: CLINIC | Age: 64
End: 2024-07-26
Payer: OTHER GOVERNMENT

## 2024-07-26 ENCOUNTER — TELEPHONE (OUTPATIENT)
Dept: FAMILY MEDICINE | Facility: CLINIC | Age: 64
End: 2024-07-26
Payer: OTHER GOVERNMENT

## 2024-07-26 DIAGNOSIS — G89.4 CHRONIC PAIN SYNDROME: Primary | ICD-10-CM

## 2024-07-26 DIAGNOSIS — M47.897 OTHER OSTEOARTHRITIS OF SPINE, LUMBOSACRAL REGION: ICD-10-CM

## 2024-07-26 RX ORDER — DOXYCYCLINE 100 MG/1
100 CAPSULE ORAL 2 TIMES DAILY
Qty: 20 CAPSULE | Refills: 0 | Status: SHIPPED | OUTPATIENT
Start: 2024-07-26 | End: 2024-08-05

## 2024-07-26 NOTE — TELEPHONE ENCOUNTER
Pt in office, asking about an antibiotic that he said was supposed to be sent in at last visit, please advise

## 2024-07-26 NOTE — TELEPHONE ENCOUNTER
Joan informed we have not received any messages about this and I will do referral and send it to preservice as stat

## 2024-07-26 NOTE — TELEPHONE ENCOUNTER
----- Message from Kalli Hinton sent at 7/26/2024 11:19 AM CDT -----  .Type: Patient Call Back        Who called:      [facility]  What is the request in detail:    AUTH FOR PAIN MANAGEMENT . PATIENT NEEDS A  AUTH SUBMITTED BEFORE MONDAY BY PROVIDER OR WONT BE ABLE TO BE SEEN BY DR PATIÑO UNTIL FEB 2025 . FACILITY HAS BEEN CALLING FOR THREE WEEKS TO GET THIS AUTH FULFILLED   Can the clinic reply by [facility]?           Would the patient rather a call back or a response via [facility]?   CALL      Best call back number:    FAX CAN ALSO BE SENT  [phone]

## 2024-08-06 ENCOUNTER — PATIENT MESSAGE (OUTPATIENT)
Dept: FAMILY MEDICINE | Facility: CLINIC | Age: 64
End: 2024-08-06
Payer: OTHER GOVERNMENT

## 2024-08-06 ENCOUNTER — TELEPHONE (OUTPATIENT)
Dept: FAMILY MEDICINE | Facility: CLINIC | Age: 64
End: 2024-08-06
Payer: OTHER GOVERNMENT

## 2024-08-06 DIAGNOSIS — Z79.899 ENCOUNTER FOR LONG-TERM (CURRENT) USE OF OTHER MEDICATIONS: Primary | ICD-10-CM

## 2024-08-07 ENCOUNTER — LAB VISIT (OUTPATIENT)
Dept: LAB | Facility: HOSPITAL | Age: 64
End: 2024-08-07
Attending: FAMILY MEDICINE
Payer: OTHER GOVERNMENT

## 2024-08-07 DIAGNOSIS — Z79.899 ENCOUNTER FOR LONG-TERM (CURRENT) USE OF OTHER MEDICATIONS: ICD-10-CM

## 2024-08-07 DIAGNOSIS — Z14.8 CARRIER OF HEMOCHROMATOSIS HFE GENE MUTATION: ICD-10-CM

## 2024-08-07 DIAGNOSIS — E11.9 TYPE 2 DIABETES MELLITUS WITHOUT COMPLICATION, WITHOUT LONG-TERM CURRENT USE OF INSULIN: ICD-10-CM

## 2024-08-07 LAB
ALBUMIN SERPL BCP-MCNC: 4.1 G/DL (ref 3.5–5.2)
ALP SERPL-CCNC: 55 U/L (ref 55–135)
ALT SERPL W/O P-5'-P-CCNC: 7 U/L (ref 10–44)
ANION GAP SERPL CALC-SCNC: 13 MMOL/L (ref 8–16)
AST SERPL-CCNC: 13 U/L (ref 10–40)
BASOPHILS # BLD AUTO: 0.05 K/UL (ref 0–0.2)
BASOPHILS NFR BLD: 0.6 % (ref 0–1.9)
BILIRUB SERPL-MCNC: 0.9 MG/DL (ref 0.1–1)
BUN SERPL-MCNC: 10 MG/DL (ref 8–23)
CALCIUM SERPL-MCNC: 9.5 MG/DL (ref 8.7–10.5)
CHLORIDE SERPL-SCNC: 107 MMOL/L (ref 95–110)
CHOLEST SERPL-MCNC: 159 MG/DL (ref 120–199)
CHOLEST/HDLC SERPL: 3 {RATIO} (ref 2–5)
CO2 SERPL-SCNC: 23 MMOL/L (ref 23–29)
CREAT SERPL-MCNC: 1 MG/DL (ref 0.5–1.4)
DIFFERENTIAL METHOD BLD: ABNORMAL
EOSINOPHIL # BLD AUTO: 0.1 K/UL (ref 0–0.5)
EOSINOPHIL NFR BLD: 1 % (ref 0–8)
ERYTHROCYTE [DISTWIDTH] IN BLOOD BY AUTOMATED COUNT: 12.6 % (ref 11.5–14.5)
EST. GFR  (NO RACE VARIABLE): >60 ML/MIN/1.73 M^2
ESTIMATED AVG GLUCOSE: 126 MG/DL (ref 68–131)
FERRITIN SERPL-MCNC: 130 NG/ML (ref 20–300)
GLUCOSE SERPL-MCNC: 121 MG/DL (ref 70–110)
HBA1C MFR BLD: 6 % (ref 4–5.6)
HCT VFR BLD AUTO: 45.7 % (ref 40–54)
HDLC SERPL-MCNC: 53 MG/DL (ref 40–75)
HDLC SERPL: 33.3 % (ref 20–50)
HGB BLD-MCNC: 15 G/DL (ref 14–18)
IMM GRANULOCYTES # BLD AUTO: 0.02 K/UL (ref 0–0.04)
IMM GRANULOCYTES NFR BLD AUTO: 0.2 % (ref 0–0.5)
IRON SERPL-MCNC: 97 UG/DL (ref 45–160)
LDLC SERPL CALC-MCNC: 88.6 MG/DL (ref 63–159)
LYMPHOCYTES # BLD AUTO: 2 K/UL (ref 1–4.8)
LYMPHOCYTES NFR BLD: 21.6 % (ref 18–48)
MCH RBC QN AUTO: 31.8 PG (ref 27–31)
MCHC RBC AUTO-ENTMCNC: 32.8 G/DL (ref 32–36)
MCV RBC AUTO: 97 FL (ref 82–98)
MONOCYTES # BLD AUTO: 0.4 K/UL (ref 0.3–1)
MONOCYTES NFR BLD: 4.9 % (ref 4–15)
NEUTROPHILS # BLD AUTO: 6.5 K/UL (ref 1.8–7.7)
NEUTROPHILS NFR BLD: 71.7 % (ref 38–73)
NONHDLC SERPL-MCNC: 106 MG/DL
NRBC BLD-RTO: 0 /100 WBC
PLATELET # BLD AUTO: 229 K/UL (ref 150–450)
PMV BLD AUTO: 10.5 FL (ref 9.2–12.9)
POTASSIUM SERPL-SCNC: 4.2 MMOL/L (ref 3.5–5.1)
PROT SERPL-MCNC: 6.5 G/DL (ref 6–8.4)
RBC # BLD AUTO: 4.71 M/UL (ref 4.6–6.2)
SATURATED IRON: 30 % (ref 20–50)
SODIUM SERPL-SCNC: 143 MMOL/L (ref 136–145)
TOTAL IRON BINDING CAPACITY: 327 UG/DL (ref 250–450)
TRANSFERRIN SERPL-MCNC: 221 MG/DL (ref 200–375)
TRIGL SERPL-MCNC: 87 MG/DL (ref 30–150)
VIT B12 SERPL-MCNC: 249 PG/ML (ref 210–950)
WBC # BLD AUTO: 9.02 K/UL (ref 3.9–12.7)

## 2024-08-07 PROCEDURE — 83540 ASSAY OF IRON: CPT | Performed by: FAMILY MEDICINE

## 2024-08-07 PROCEDURE — 85025 COMPLETE CBC W/AUTO DIFF WBC: CPT | Performed by: FAMILY MEDICINE

## 2024-08-07 PROCEDURE — 36415 COLL VENOUS BLD VENIPUNCTURE: CPT | Mod: PO | Performed by: FAMILY MEDICINE

## 2024-08-07 PROCEDURE — 83036 HEMOGLOBIN GLYCOSYLATED A1C: CPT | Performed by: FAMILY MEDICINE

## 2024-08-07 PROCEDURE — 80053 COMPREHEN METABOLIC PANEL: CPT | Performed by: FAMILY MEDICINE

## 2024-08-07 PROCEDURE — 82607 VITAMIN B-12: CPT | Performed by: FAMILY MEDICINE

## 2024-08-07 PROCEDURE — 80061 LIPID PANEL: CPT | Performed by: FAMILY MEDICINE

## 2024-08-07 PROCEDURE — 82728 ASSAY OF FERRITIN: CPT | Performed by: FAMILY MEDICINE

## 2024-08-09 ENCOUNTER — TELEPHONE (OUTPATIENT)
Dept: FAMILY MEDICINE | Facility: CLINIC | Age: 64
End: 2024-08-09
Payer: OTHER GOVERNMENT

## 2024-08-09 ENCOUNTER — PATIENT MESSAGE (OUTPATIENT)
Dept: FAMILY MEDICINE | Facility: CLINIC | Age: 64
End: 2024-08-09
Payer: OTHER GOVERNMENT

## 2024-08-09 DIAGNOSIS — Z12.5 SCREENING FOR PROSTATE CANCER: Primary | ICD-10-CM

## 2024-08-13 RX ORDER — LISINOPRIL AND HYDROCHLOROTHIAZIDE 20; 25 MG/1; MG/1
1 TABLET ORAL DAILY
Qty: 90 TABLET | Refills: 3 | Status: SHIPPED | OUTPATIENT
Start: 2024-08-13

## 2024-08-13 NOTE — TELEPHONE ENCOUNTER
Refill Decision Note   Gopi Garcia  is requesting a refill authorization.  Brief Assessment and Rationale for Refill:  Approve     Medication Therapy Plan:         Comments:     Note composed:10:30 AM 08/13/2024

## 2024-08-13 NOTE — TELEPHONE ENCOUNTER
No care due was identified.  Health [facility] Embedded Care Due Messages. Reference number: [id number].   8/13/2024 2:19:10 AM CDT

## 2024-08-20 DIAGNOSIS — F51.02 INSOMNIA DUE TO PSYCHOLOGICAL STRESS: ICD-10-CM

## 2024-08-21 RX ORDER — TRAZODONE HYDROCHLORIDE 50 MG/1
50 TABLET ORAL NIGHTLY
Qty: 90 TABLET | Refills: 1 | Status: SHIPPED | OUTPATIENT
Start: 2024-08-21

## 2024-08-28 RX ORDER — ESCITALOPRAM OXALATE 10 MG/1
10 TABLET ORAL
Qty: 90 TABLET | Refills: 3 | Status: SHIPPED | OUTPATIENT
Start: 2024-08-28

## 2024-08-28 NOTE — TELEPHONE ENCOUNTER
Refill Decision Note   Gopi Garcia  is requesting a refill authorization.  Brief Assessment and Rationale for Refill:  Approve     Medication Therapy Plan:         Comments:     Note composed:1:31 PM 08/28/2024

## 2024-08-28 NOTE — TELEPHONE ENCOUNTER
No care due was identified.  [facility] Embedded Care Due Messages. Reference number: [id number].   8/28/2024 8:23:45 AM CDT

## 2024-08-29 ENCOUNTER — HOSPITAL ENCOUNTER (OUTPATIENT)
Dept: RADIOLOGY | Facility: HOSPITAL | Age: 64
Discharge: HOME | End: 2024-08-29
Attending: FAMILY MEDICINE
Payer: OTHER GOVERNMENT

## 2024-08-29 ENCOUNTER — OFFICE VISIT (OUTPATIENT)
Dept: OPTOMETRY | Facility: CLINIC | Age: 64
End: 2024-08-29
Payer: OTHER GOVERNMENT

## 2024-08-29 DIAGNOSIS — H43.812 POSTERIOR VITREOUS DETACHMENT OF LEFT EYE: ICD-10-CM

## 2024-08-29 DIAGNOSIS — Z12.2 ENCOUNTER FOR SCREENING FOR LUNG CANCER: ICD-10-CM

## 2024-08-29 DIAGNOSIS — H52.203 MYOPIA WITH ASTIGMATISM AND PRESBYOPIA, BILATERAL: ICD-10-CM

## 2024-08-29 DIAGNOSIS — H25.13 AGE-RELATED NUCLEAR CATARACT, BILATERAL: ICD-10-CM

## 2024-08-29 DIAGNOSIS — H52.13 MYOPIA WITH ASTIGMATISM AND PRESBYOPIA, BILATERAL: ICD-10-CM

## 2024-08-29 DIAGNOSIS — E11.9 TYPE 2 DIABETES MELLITUS WITHOUT COMPLICATION, WITHOUT LONG-TERM CURRENT USE OF INSULIN: ICD-10-CM

## 2024-08-29 DIAGNOSIS — E11.9 TYPE 2 DIABETES MELLITUS WITHOUT RETINOPATHY: Primary | ICD-10-CM

## 2024-08-29 DIAGNOSIS — H52.4 MYOPIA WITH ASTIGMATISM AND PRESBYOPIA, BILATERAL: ICD-10-CM

## 2024-08-29 PROCEDURE — 71271 CT THORAX LUNG CANCER SCR C-: CPT | Mod: TC,PO

## 2024-08-29 PROCEDURE — 99999 PR PBB SHADOW E&M-EST. PATIENT-LVL III: CPT | Mod: PBBFAC,,,

## 2024-08-29 PROCEDURE — 92015 DETERMINE REFRACTIVE STATE: CPT | Mod: ,,,

## 2024-08-29 PROCEDURE — 71271 CT THORAX LUNG CANCER SCR C-: CPT | Mod: 26,,, | Performed by: RADIOLOGY

## 2024-08-29 PROCEDURE — 99213 OFFICE O/P EST LOW 20 MIN: CPT | Mod: PBBFAC,25,PO

## 2024-08-29 PROCEDURE — 92014 COMPRE OPH EXAM EST PT 1/>: CPT | Mod: S$PBB,,,

## 2024-08-29 RX ORDER — OXYCODONE HYDROCHLORIDE 10 MG/1
TABLET ORAL
COMMUNITY
Start: 2024-04-26

## 2024-08-29 NOTE — PROGRESS NOTES
HPI    Pt here for annual diabetic exam. Last exam- 2021    Pt sts specs working well (2 yo). C/o floaters OU, no changes. Pt denies   flashes/pain. Pt uses allergy gtt daily. Does not check BS.     Hemoglobin A1C       Date                     Value               Ref Range             Status                08/07/2024               6.0 (H)             4.0 - 5.6 %           Final                 05/03/2024               5.9 (H)             4.0 - 5.6 %           Final                 12/06/2023               6.0 (H)             4.0 - 5.6 %           Final                Last edited by Nel Garcia, OD on 8/29/2024  1:04 PM.            Assessment /Plan     For exam results, see Encounter Report.    Type 2 diabetes mellitus without retinopathy    Type 2 diabetes mellitus without complication, without long-term current use of insulin  -     Ambulatory referral/consult to Optometry    Posterior vitreous detachment of left eye    Age-related nuclear cataract, bilateral    Myopia with astigmatism and presbyopia, bilateral      1-2. No diabetic retinopathy or macular edema evident on today's exam OD, OS. Ed pt on importance of maintaining strict BS control due to potential for visual fluctuations and/or vision loss. Monitor with yearly dilated exam.    3. PVD OS. Ed pt on the nature and etiology of PVDs. Reviewed signs and symptoms of a retinal detachment thoroughly and ed pt to RTC asap if experienced.    4. Mild, not yet visually significant. Surgery not recommended at this time. Ed pt on symptoms of worsening cataracts including reduced BCVA and glare. Monitor yearly for changes.    5. Discussed spectacle options with pt and released final spec rx. Ed pt on change in rx and adaptation.    RTC: 1 year for comprehensive exam or sooner prn

## 2024-09-20 RX ORDER — FLUTICASONE PROPIONATE 50 MCG
SPRAY, SUSPENSION (ML) NASAL
Qty: 32 ML | Refills: 2 | Status: SHIPPED | OUTPATIENT
Start: 2024-09-20

## 2024-09-20 NOTE — TELEPHONE ENCOUNTER
Refill Decision Note   Gopi Garcia  is requesting a refill authorization.  Brief Assessment and Rationale for Refill:  Approve     Medication Therapy Plan:        Comments:     Note composed:11:45 AM 09/20/2024

## 2024-09-20 NOTE — TELEPHONE ENCOUNTER
No care due was identified.  Health [facility] Embedded Care Due Messages. Reference number: [id number].   9/20/2024 11:37:28 AM CDT

## 2024-09-23 ENCOUNTER — TELEPHONE (OUTPATIENT)
Dept: CARDIOLOGY | Facility: CLINIC | Age: 64
End: 2024-09-23
Payer: OTHER GOVERNMENT

## 2024-09-23 NOTE — TELEPHONE ENCOUNTER
Rx refill request for Crestor received from [email or web address].   Pts LOV with Dr Doyle was 9/2021.     I called pt to schedule f/u appt for med refills. No answer. Lvm for pt to return my call.

## 2024-09-25 RX ORDER — METFORMIN HYDROCHLORIDE 500 MG/1
TABLET, EXTENDED RELEASE ORAL
Qty: 90 TABLET | Refills: 1 | Status: SHIPPED | OUTPATIENT
Start: 2024-09-25

## 2024-09-25 NOTE — TELEPHONE ENCOUNTER
No care due was identified.  [facility] Embedded Care Due Messages. Reference number: [id number].   9/25/2024 2:01:55 AM CDT

## 2024-09-25 NOTE — TELEPHONE ENCOUNTER
Refill Decision Note   Gopi Garcia  is requesting a refill authorization.  Brief Assessment and Rationale for Refill:  Approve     Medication Therapy Plan:         Comments:     Note composed:12:20 PM 09/25/2024

## 2024-09-30 NOTE — PROGRESS NOTES
The patient location is: [facility]  The chief complaint leading to consultation is:   Chief Complaint   Patient presents with    Sleep Apnea    Insomnia         Visit type: audiovisual    Face to Face time with patient: 6  mins  30 minutes of total time spent on the encounter, which includes face to face time and non-face to face time preparing to see the patient (eg, review of tests), Obtaining and/or reviewing separately obtained history, Documenting clinical information in the electronic or other health record, Independently interpreting results (not separately reported) and communicating results to the patient/family/caregiver, or Care coordination (not separately reported).         Each patient to whom he or she provides medical services by telemedicine is:  (1) informed of the relationship between the physician and patient and the respective role of any other health care provider with respect to management of the patient; and (2) notified that he or she may decline to receive medical services by telemedicine and may withdraw from such care at any time.    Notes:                                           Pulmonary Outpatient  Visit     Subjective:       Patient ID: Gopi Garcia is a 64 y.o. male.    Social History     Tobacco Use   Smoking Status Former    Average packs/day: 1 pack/day for 30.0 years (30.0 ttl pk-yrs)    Types: Cigars, Cigarettes    Start date: 10/17/1977    Quit date: 10/17/2007    Years since quittin.9   Smokeless Tobacco Never            Chief Complaint: Sleep Apnea and Insomnia      Gopi Garcia is 62 y.o.  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 There were no RERAs (respiratory effort- related  arousals). Sporadic, mild to moderately loud snoring was noted.  2. CPAP was initiated at 1:19 am. The titration polysomnography revealed that though 8 cm, 9 cm and 10 cm were each  completely tested and ineffective (AHI= 14.3, 14.4 and 21.9 respectively), and each had a high rate of central sleep apneas  predominating; Note too that central and mixed apneas did occur pre - CPAP, but did not predominate. The overall A + H  Index was 15.4 / hr asleep, with only 0.5 respiratory event - related arousals / hr asleep for the titration trial (there were no  RERAs). The mean SpO2 value was 94.2 % throughout the study, with a minimum oxygen saturation during sleep of 88.0 %  (waking baseline SpO2 was 96 %). Snoring was soft to absent at 10 cm, but was not eliminated. The patient's own [employer] nasal CPAP mask was used and was well - tolerated. Please see PAP trial outcomes table, below.      07/27/2023  Followup visit  Concern  Recently started Ambien: gets to sleep  Duration not satisfactory  Bed time: 10-11 pm  Wake time: 6-7 am  On Ambien 5 mg  Fatigue during day  Using CPAP    05/28/2024  Followup  Insomnia  On Trazodone and Ambien   Has been off ambien 2 months  On CPAP  Bed time: 10-11 pm: up at 02:30 am and cannot get back to sleep  Wake time: 4-5 am  Has daytime sleepiness, takes nap  In school pursuing doctorate    10/01/2024  Follow-up visit  Doing well  On CPAP  Bed time: 10 pm  Wake time: 5 am  On Ambien: works  Occasional nocturia 02:30 am  Using CPAP  Mask nasal                Review of Systems   All other systems reviewed and are  negative.      Outpatient Encounter Medications as of 10/1/2024   Medication Sig Dispense Refill    amLODIPine (NORVASC) 5 MG tablet TAKE 1 TABLET DAILY 30 tablet 11    aspirin (ECOTRIN) 81 MG EC tablet Take 81 mg by mouth once daily.      blood sugar diagnostic Strp Test glucose 1 x daily 50 strip prn    ciclopirox (PENLAC) 8 % Soln Apply topically nightly. Apply to adjacent skin and affected nails daily.  Remove with alcohol every 7 days. 6.6 mL 11    EScitalopram oxalate (LEXAPRO) 10 MG tablet TAKE 1 TABLET DAILY 90 tablet 3    fluticasone propionate (FLONASE) 50 mcg/actuation nasal spray USE 1 SPRAY (50 MCG TOTAL) IN EACH NOSTRIL ONCE DAILY 32 mL 2    lancets Misc As directed 100 each prn    lisinopriL-hydrochlorothiazide (PRINZIDE,ZESTORETIC) 20-25 mg Tab Take 1 tablet by mouth once daily. 90 tablet 3    metFORMIN (GLUCOPHAGE-XR) 500 MG ER 24hr tablet TAKE 1 TABLET DAILY 90 tablet 1    nitroGLYCERIN (NITROSTAT) 0.4 MG SL tablet DISSOLVE 1 TABLET UNDER THE TONGUE EVERY 5 MINUTES AS NEEDED FOR CHEST PAIN. MAXIMUM 3 TABLETS. DO NOT TAKE IF USING VIAGRA 25 tablet 11    omega-3 fatty acids/fish oil (FISH OIL-OMEGA-3 FATTY ACIDS) 300-1,000 mg capsule Take by mouth once daily.      oxyCODONE (ROXICODONE) 10 mg Tab immediate release tablet       oxycodone-acetaminophen (PERCOCET)  mg per tablet Take 1 tablet by mouth 4 (four) times daily.  0    rosuvastatin (CRESTOR) 20 MG tablet TAKE 1 TABLET DAILY 90 tablet 3    sildenafil (VIAGRA) 100 MG tablet Take 1 tablet (100 mg total) by mouth daily as needed for Erectile Dysfunction (Don't take if using any nitroglycerin). 18 tablet 3    traZODone (DESYREL) 50 MG tablet TAKE 1 TABLET BY MOUTH NIGHTLY AS NEEDED FOR INSOMNIA. 90 tablet 1    blood-glucose meter (FREESTYLE FREEDOM LITE) kit Use as instructed 1 each 11    ketoconazole (NIZORAL) 2 % cream Apply topically once daily. 60 g 1    zolpidem (AMBIEN) 5 MG Tab Take 1 tablet (5 mg total) by mouth every evening. 30  tablet 3    [DISCONTINUED] fluticasone propionate (FLONASE) 50 mcg/actuation nasal spray 1 spray (50 mcg total) by Each Nostril route once daily. 54 mL 3    [DISCONTINUED] metFORMIN (GLUCOPHAGE-XR) 500 MG ER 24hr tablet TAKE 1 TABLET DAILY 90 tablet 0    [DISCONTINUED] zolpidem (AMBIEN) 5 MG Tab Take 1 tablet (5 mg total) by mouth every evening. 30 tablet 3     No facility-administered encounter medications on file as of 10/1/2024.       The following portions of the patient's history were reviewed and updated as appropriate: He  has a past medical history of Anxiety, AR (allergic rhinitis), CAD (coronary artery disease), Carrier of hemochromatosis HFE gene mutation, Centrilobular emphysema (07/23/2024), Chronic low back pain, Colon polyp (2010), Coronary artery disease, Depression, Diverticulosis, ED (erectile dysfunction), Fatty liver, Hepatomegaly, HTN (hypertension), Hyperlipidemia, Kidney stone (2009), Myocardial infarct, old, Pulmonary nodule, left (01/02/2020), Sleep apnea, and Type 2 diabetes mellitus without complication, without long-term current use of insulin (06/08/2021).  He does not have any pertinent problems on file.  He  has a past surgical history that includes Inguinal hernia repair (Right, 2011); Vasectomy (2005); Cardiac catheterization (2006); Colonoscopy (49 years old); and Colonoscopy (N/A, 6/2/2020).  His family history includes Alzheimer's disease in his mother; Colon polyps in his brother; Heart attack in his paternal grandfather; Hemochromatosis in his brother and brother; Lung cancer in his father; Prostate cancer in his brother.  He  reports that he quit smoking about 16 years ago. His smoking use included cigars and cigarettes. He started smoking about 46 years ago. He has a 30 pack-year smoking history. He has never used smokeless tobacco. He reports current alcohol use. He reports that he does not use drugs.  He has a current medication list which includes the following  prescription(s): amlodipine, aspirin, blood sugar diagnostic, ciclopirox, escitalopram oxalate, fluticasone propionate, lancets, lisinopril-hydrochlorothiazide, metformin, nitroglycerin, fish oil-omega-3 fatty acids, oxycodone, oxycodone-acetaminophen, rosuvastatin, sildenafil, trazodone, blood-glucose meter, ketoconazole, and zolpidem.  Current Outpatient Medications on File Prior to Visit   Medication Sig Dispense Refill    amLODIPine (NORVASC) 5 MG tablet TAKE 1 TABLET DAILY 30 tablet 11    aspirin (ECOTRIN) 81 MG EC tablet Take 81 mg by mouth once daily.      blood sugar diagnostic Strp Test glucose 1 x daily 50 strip prn    ciclopirox (PENLAC) 8 % Soln Apply topically nightly. Apply to adjacent skin and affected nails daily.  Remove with alcohol every 7 days. 6.6 mL 11    EScitalopram oxalate (LEXAPRO) 10 MG tablet TAKE 1 TABLET DAILY 90 tablet 3    fluticasone propionate (FLONASE) 50 mcg/actuation nasal spray USE 1 SPRAY (50 MCG TOTAL) IN EACH NOSTRIL ONCE DAILY 32 mL 2    lancets Misc As directed 100 each prn    lisinopriL-hydrochlorothiazide (PRINZIDE,ZESTORETIC) 20-25 mg Tab Take 1 tablet by mouth once daily. 90 tablet 3    metFORMIN (GLUCOPHAGE-XR) 500 MG ER 24hr tablet TAKE 1 TABLET DAILY 90 tablet 1    nitroGLYCERIN (NITROSTAT) 0.4 MG SL tablet DISSOLVE 1 TABLET UNDER THE TONGUE EVERY 5 MINUTES AS NEEDED FOR CHEST PAIN. MAXIMUM 3 TABLETS. DO NOT TAKE IF USING VIAGRA 25 tablet 11    omega-3 fatty acids/fish oil (FISH OIL-OMEGA-3 FATTY ACIDS) 300-1,000 mg capsule Take by mouth once daily.      oxyCODONE (ROXICODONE) 10 mg Tab immediate release tablet       oxycodone-acetaminophen (PERCOCET)  mg per tablet Take 1 tablet by mouth 4 (four) times daily.  0    rosuvastatin (CRESTOR) 20 MG tablet TAKE 1 TABLET DAILY 90 tablet 3    sildenafil (VIAGRA) 100 MG tablet Take 1 tablet (100 mg total) by mouth daily as needed for Erectile Dysfunction (Don't take if using any nitroglycerin). 18 tablet 3    traZODone  (DESYREL) 50 MG tablet TAKE 1 TABLET BY MOUTH NIGHTLY AS NEEDED FOR INSOMNIA. 90 tablet 1    blood-glucose meter (FREESTYLE FREEDOM LITE) kit Use as instructed 1 each 11    ketoconazole (NIZORAL) 2 % cream Apply topically once daily. 60 g 1    [DISCONTINUED] zolpidem (AMBIEN) 5 MG Tab Take 1 tablet (5 mg total) by mouth every evening. 30 tablet 3     No current facility-administered medications on file prior to visit.     He has No Known Allergies..      BP Readings from Last 3 Encounters:   07/23/24 131/80   06/01/23 122/80   05/26/23 128/76     Snoring / Sleep:          MMRC Dyspnea Scale (4 is worst)     [] MMRC 0: Dyspneic on strenuous excercise (0 points)    [] MMRC 1: Dyspneic on walking a slight hill (0 points)    [] MMRC 2: Dyspneic on walking level ground; must stop occasionally due to breathlessness (1 point)    [] MMRC 3: Must stop for breathlessness after walking 100 yards or after a few minutes (2 points)    [] MMRC 4: Cannot leave house; breathless on dressing/undressing (3 points)          10/1/2024     8:02 AM   EPWORTH SLEEPINESS SCALE   Sitting and reading 0   Watching TV 0   Sitting, inactive in a public place (e.g. a theatre or a meeting) 0   As a passenger in a car for an hour without a break 0   Lying down to rest in the afternoon when circumstances permit 1   Sitting and talking to someone 0   Sitting quietly after a lunch without alcohol 0   In a car, while stopped for a few minutes in traffic 0   Total score 1              Objective:     Vital Signs (Most Recent)   ]  Wt Readings from Last 2 Encounters:   07/23/24 117 kg (258 lb)   05/28/24 113.4 kg (250 lb)       Physical Exam  Vitals and nursing note reviewed.   HENT:      Head: Normocephalic.   Eyes:      Pupils: Pupils are equal, round, and reactive to light.   Neurological:      Mental Status: He is alert and oriented to person, place, and time.          Laboratory  Lab Results   Component Value Date    WBC 9.02 08/07/2024    RBC 4.71  "08/07/2024    HGB 15.0 08/07/2024    HCT 45.7 08/07/2024    MCV 97 08/07/2024    MCH 31.8 (H) 08/07/2024    MCHC 32.8 08/07/2024    RDW 12.6 08/07/2024     08/07/2024    MPV 10.5 08/07/2024    GRAN 6.5 08/07/2024    GRAN 71.7 08/07/2024    LYMPH 2.0 08/07/2024    LYMPH 21.6 08/07/2024    MONO 0.4 08/07/2024    MONO 4.9 08/07/2024    EOS 0.1 08/07/2024    BASO 0.05 08/07/2024    EOSINOPHIL 1.0 08/07/2024    BASOPHIL 0.6 08/07/2024       BMP  Lab Results   Component Value Date     08/07/2024    K 4.2 08/07/2024     08/07/2024    CO2 23 08/07/2024    BUN 10 08/07/2024    CREATININE 1.0 08/07/2024    CALCIUM 9.5 08/07/2024    ANIONGAP 13 08/07/2024    ESTGFRAFRICA >60.0 12/09/2021    EGFRNONAA >60.0 12/09/2021    AST 13 08/07/2024    ALT 7 (L) 08/07/2024    PROT 6.5 08/07/2024          No results found for: "IGE"     No results found for: "ASPERGILLUS"  No results found for: "AFUMIGATUSCL"     No results found for: "ACE"     Diagnostic Results:  I have personally reviewed today the following studies:    CT Chest Lung Screening Low Dose  Narrative: EXAMINATION:  CT CHEST LUNG SCREENING LOW DOSE    CLINICAL HISTORY:  Lung cancer screening, >= 20 pk-yr smoking history, risk factor(s) (Age >= 50y); Encounter for screening for malignant neoplasm of respiratory organs    TECHNIQUE:  CT of the thorax was performed with low dose, lung screening protocol.  No contrast was administered.  Sagittal and coronal reconstructions were obtained.    COMPARISON:  Chest CT 09/22/2023.    FINDINGS:  Lungs: Stable 3 mm pulmonary nodule within the left lower lobe (series 4, image 240).  No new or enlarging pulmonary nodules.  Scattered calcified granulomas redemonstrated.  Mild upper lobe predominant pulmonary emphysema.  No consolidation.    Pleura:   No effusion..    Heart and pericardium: Normal size without effusion.    Aorta and vasculature: Atherosclerosis including coronary arteries.    Chest wall and skeletal " structures: Unremarkable except age-appropriate degenerative changes.    Upper abdomen: Unremarkable.  Impression: Lung-RADS Category:  2 - Benign Appearance or Behavior - continue annual screening with LDCT in 12 months.    Clinically or potentially clinically significant non lung cancer finding:  None.    Prior Lung Cancer Modifier:  No history of prior lung cancer.    Electronically signed by: Cliff Farias  Date:    08/29/2024  Time:    11:42     Care Team  [facility]. [address], [place] [address] [phone]  Compliance Information 2/28/2024 - 5/27/2024  Compliance Summary  2/28/2024 - 5/27/2024 (90 days)  Days with Device Usage 90 days  Days without Device Usage 0 days  Percent Days with Device Usage 100.0%  Cumulative Usage 28 days 5 hrs. 39 mins. 41 secs.  Maximum Usage (1 Day) 10 hrs. 38 mins. 40 secs.  Average Usage (All Days) 7 hrs. 31 mins. 46 secs.  Average Usage (Days Used) 7 hrs. 31 mins. 46 secs.  Minimum Usage (1 Day) 1 hrs. 54 mins. 47 secs.  Percent of Days with Usage >= 4 Hours 97.8%  Percent of Days with Usage < 4 Hours 2.2%  Date Range  Total Blower Time 29 days 12 hrs. 9 mins.  Average AHI 1.5  Auto-CPAP Summary  Auto-CPAP Mean Pressure 6.8 cmH2O  Auto-CPAP Peak Average Pressure 9.2 cmH2O  Device Pressure <= 90% of Time 8.3 cmH2O  Average Time in Large Leak Per Day 26 secs.  Printed By:    Compliance Information 7/2/2024 - 9/29/2024  Compliance Summary  7/2/2024 - 9/29/2024 (90 days)  Days with Device Usage 90 days  Days without Device Usage 0 days  Percent Days with Device Usage 100.0%  Cumulative Usage 28 days 2 hrs. 1 mins. 33 secs.  Maximum Usage (1 Day) 11 hrs. 28 mins. 13 secs.  Average Usage (All Days) 7 hrs. 29 mins. 21 secs.  Average Usage (Days Used) 7 hrs. 29 mins. 21 secs.  Minimum Usage (1 Day) 2 hrs. 36 secs.  Percent of Days with Usage >= 4 Hours 97.8%  Percent of Days with Usage < 4 Hours 2.2%  Date Range  Total Blower Time 28 days 14  hrs. 50 mins. 17 secs.  Average AHI 1.6  Auto-CPAP Summary  Auto-CPAP Mean Pressure 6.7 cmH2O  Auto-CPAP Peak Average Pressure 8.3 cmH2O  Device Pressure <= 90% of Time 8.0 cmH2O  Average Time in Large Leak Per Day 22 secs.      Assessment/Plan:     Problem List Items Addressed This Visit       Hyperlipidemia associated with type 2 diabetes mellitus    Pulmonary nodule, left    Centrilobular emphysema    DASHA (obstructive sleep apnea) - Primary         10/1/2024     8:02 AM   EPWORTH SLEEPINESS SCALE   Sitting and reading 0   Watching TV 0   Sitting, inactive in a public place (e.g. a theatre or a meeting) 0   As a passenger in a car for an hour without a break 0   Lying down to rest in the afternoon when circumstances permit 1   Sitting and talking to someone 0   Sitting quietly after a lunch without alcohol 0   In a car, while stopped for a few minutes in traffic 0   Total score 1        Data 07/02/2024 to 09/29/2024  Usage > 4 hrs was 97.8%  AHI 1.6    USING AND BENEFITS         Relevant Orders    CPAP/BIPAP SUPPLIES    Insomnia due to psychological stress     On Ambien and Trazodone  Occasional sleep disruption spontaneous or due to nocturia    Requested Prescriptions     Signed Prescriptions Disp Refills    zolpidem (AMBIEN) 5 MG Tab 30 tablet 3     Sig: Take 1 tablet (5 mg total) by mouth every evening.         reviewed           Relevant Medications    zolpidem (AMBIEN) 5 MG Tab      Meds refill  Ambien worked well         The patient was given open opportunity to ask questions and/or express concerns about treatment plan.   All questions/concerns were discussed.     No follow-ups on file.    This note was prepared using voice recognition system and is likely to have sound alike errors that may have been overlooked even after proof reading.  Please call me with any questions    Discussed diagnosis, its evaluation, treatment and usual course. All questions answered.    Thank you for the courtesy of  participating in the care of this patient    Philip Hays MD      Personal Diagnostic Review  [x]  LDCT    [x]  Grandy    [x]  Download    []  6MWD    []  LABS    []  CHEST CT    []  PET CT    []  Biopsy results

## 2024-10-01 ENCOUNTER — OFFICE VISIT (OUTPATIENT)
Dept: PULMONOLOGY | Facility: CLINIC | Age: 64
End: 2024-10-01
Payer: OTHER GOVERNMENT

## 2024-10-01 DIAGNOSIS — R91.1 PULMONARY NODULE, LEFT: ICD-10-CM

## 2024-10-01 DIAGNOSIS — E78.5 HYPERLIPIDEMIA ASSOCIATED WITH TYPE 2 DIABETES MELLITUS: ICD-10-CM

## 2024-10-01 DIAGNOSIS — G47.33 OSA (OBSTRUCTIVE SLEEP APNEA): Primary | ICD-10-CM

## 2024-10-01 DIAGNOSIS — F51.02 INSOMNIA DUE TO PSYCHOLOGICAL STRESS: ICD-10-CM

## 2024-10-01 DIAGNOSIS — J43.2 CENTRILOBULAR EMPHYSEMA: ICD-10-CM

## 2024-10-01 DIAGNOSIS — E11.69 HYPERLIPIDEMIA ASSOCIATED WITH TYPE 2 DIABETES MELLITUS: ICD-10-CM

## 2024-10-01 PROCEDURE — 99214 OFFICE O/P EST MOD 30 MIN: CPT | Mod: 95,,, | Performed by: INTERNAL MEDICINE

## 2024-10-01 RX ORDER — ZOLPIDEM TARTRATE 5 MG/1
5 TABLET ORAL NIGHTLY
Qty: 30 TABLET | Refills: 3 | Status: SHIPPED | OUTPATIENT
Start: 2024-10-01 | End: 2025-01-29

## 2024-10-01 NOTE — ASSESSMENT & PLAN NOTE
10/1/2024     8:02 AM   EPWORTH SLEEPINESS SCALE   Sitting and reading 0   Watching TV 0   Sitting, inactive in a public place (e.g. a theatre or a meeting) 0   As a passenger in a car for an hour without a break 0   Lying down to rest in the afternoon when circumstances permit 1   Sitting and talking to someone 0   Sitting quietly after a lunch without alcohol 0   In a car, while stopped for a few minutes in traffic 0   Total score 1        Data 07/02/2024 to 09/29/2024  Usage > 4 hrs was 97.8%  AHI 1.6    USING AND BENEFITS

## 2024-10-01 NOTE — ASSESSMENT & PLAN NOTE
On Ambien and Trazodone  Occasional sleep disruption spontaneous or due to nocturia    Requested Prescriptions     Signed Prescriptions Disp Refills    zolpidem (AMBIEN) 5 MG Tab 30 tablet 3     Sig: Take 1 tablet (5 mg total) by mouth every evening.         reviewed

## 2024-10-03 ENCOUNTER — TELEPHONE (OUTPATIENT)
Dept: PAIN MEDICINE | Facility: CLINIC | Age: 64
End: 2024-10-03
Payer: OTHER GOVERNMENT

## 2024-10-03 ENCOUNTER — PATIENT MESSAGE (OUTPATIENT)
Dept: FAMILY MEDICINE | Facility: CLINIC | Age: 64
End: 2024-10-03
Payer: OTHER GOVERNMENT

## 2024-10-04 ENCOUNTER — HOSPITAL ENCOUNTER (OUTPATIENT)
Dept: RADIOLOGY | Facility: HOSPITAL | Age: 64
Discharge: HOME | End: 2024-10-04
Attending: PHYSICIAN ASSISTANT
Payer: OTHER GOVERNMENT

## 2024-10-04 ENCOUNTER — PATIENT MESSAGE (OUTPATIENT)
Dept: FAMILY MEDICINE | Facility: CLINIC | Age: 64
End: 2024-10-04

## 2024-10-04 ENCOUNTER — OFFICE VISIT (OUTPATIENT)
Dept: FAMILY MEDICINE | Facility: CLINIC | Age: 64
End: 2024-10-04
Payer: OTHER GOVERNMENT

## 2024-10-04 VITALS
HEIGHT: 75 IN | RESPIRATION RATE: 18 BRPM | WEIGHT: 265.31 LBS | BODY MASS INDEX: 32.99 KG/M2 | SYSTOLIC BLOOD PRESSURE: 151 MMHG | DIASTOLIC BLOOD PRESSURE: 88 MMHG | HEART RATE: 77 BPM

## 2024-10-04 DIAGNOSIS — R10.9 RIGHT FLANK PAIN: Primary | ICD-10-CM

## 2024-10-04 DIAGNOSIS — G89.29 CHRONIC BILATERAL LOW BACK PAIN WITHOUT SCIATICA: ICD-10-CM

## 2024-10-04 DIAGNOSIS — R10.9 RIGHT FLANK PAIN: ICD-10-CM

## 2024-10-04 DIAGNOSIS — M54.50 CHRONIC BILATERAL LOW BACK PAIN WITHOUT SCIATICA: ICD-10-CM

## 2024-10-04 LAB
BILIRUB UR QL STRIP: NEGATIVE
CLARITY UR: CLEAR
COLOR UR: YELLOW
GLUCOSE UR QL STRIP: NEGATIVE
HGB UR QL STRIP: NEGATIVE
KETONES UR QL STRIP: NEGATIVE
LEUKOCYTE ESTERASE UR QL STRIP: NEGATIVE
NITRITE UR QL STRIP: NEGATIVE
PH UR STRIP: 6.5 [PH] (ref 5–8)
PROT UR QL STRIP: NEGATIVE
SP GR UR STRIP: 1.01 (ref 1–1.03)
URN SPEC COLLECT METH UR: NORMAL

## 2024-10-04 PROCEDURE — 99215 OFFICE O/P EST HI 40 MIN: CPT | Mod: PBBFAC,25,PO | Performed by: PHYSICIAN ASSISTANT

## 2024-10-04 PROCEDURE — 99999 PR PBB SHADOW E&M-EST. PATIENT-LVL V: CPT | Mod: PBBFAC,,, | Performed by: PHYSICIAN ASSISTANT

## 2024-10-04 PROCEDURE — 81003 URINALYSIS AUTO W/O SCOPE: CPT | Mod: PO | Performed by: PHYSICIAN ASSISTANT

## 2024-10-04 PROCEDURE — 74018 RADEX ABDOMEN 1 VIEW: CPT | Mod: 26,,, | Performed by: RADIOLOGY

## 2024-10-04 PROCEDURE — 74018 RADEX ABDOMEN 1 VIEW: CPT | Mod: TC,PO

## 2024-10-04 RX ORDER — CYCLOBENZAPRINE HCL 10 MG
10 TABLET ORAL 3 TIMES DAILY PRN
Qty: 30 TABLET | Refills: 0 | Status: SHIPPED | OUTPATIENT
Start: 2024-10-04

## 2024-10-04 NOTE — PROGRESS NOTES
Assessment/Plan:    Problem List Items Addressed This Visit          Orthopedic    Chronic low back pain    Relevant Medications    cyclobenzaprine (FLEXERIL) 10 MG tablet     Other Visit Diagnoses       Right flank pain    -  Primary    Relevant Orders    Urinalysis, Reflex to Urine Culture Urine, Clean Catch    X-Ray KUB (Completed)        -R flank pain >1 week  -hx of kidney stones and chronic low back pain  -will obtain UA and KUB XR today  -consider CT abd/pelvis in the future if needed  -if neg, likely MSK related  -continue conservative treatment, rest, ice/heat applications, PRN anti-inflammatory medication  -ER precautions for severe or worsening of symptoms    Follow up if symptoms worsen or fail to improve.    Marcella Luu PA-C  _____________________________________________________________________________________________________________________________________________________    CC: right flank pain    HPI: Patient is in clinic today as an established patient here for right flank pain.    RIGHT FLANK PAIN:  He reports a 7 day history of right flank pain, describing it as a constant ache that sometimes becomes sharp and worsens with movement. The pain is located in the right flank area and feels deeper than a pulled muscle. He notes it is different from his chronic back pain and similar to a previous kidney stone experience on the left side about 15 years ago. He denies pain shooting across the abdomen or discolored urine, which he had experienced with his previous kidney stone. He did state that he was installing water filtration system under his sink prior to symptom onset which could also be contributing to his current symptoms. He denies urinary symptoms, fever, abdominal pain, nausea, vomiting, or changes in bowel habits. He has been taking ibuprofen 800 mg 3 times daily and sometimes Tylenol for pain relief, in addition to his prescribed oxycodone, which only masks the pain for about an hour. He  reports a history of chronic back pain, typically located on his spine, for which he takes oxycodone. No other complaints today.    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    Centrilobular emphysema 2024    Chronic low back pain     Colon polyp     told to repeat 5 yrs    Coronary artery disease     no stents    Depression     Diverticulosis     ED (erectile dysfunction)     Fatty liver     Hepatomegaly     HTN (hypertension)     Hyperlipidemia     Kidney stone 2009    Myocardial infarct, old     Pulmonary nodule, left 2020    Repeat CT 2021    Sleep apnea     Type 2 diabetes mellitus without complication, without long-term current use of insulin 2021     Past Surgical History:   Procedure Laterality Date    CARDIAC CATHETERIZATION      COLONOSCOPY  49 years old    colon polyps removed per patient report    COLONOSCOPY N/A 2020    Procedure: COLONOSCOPY;  Surgeon: Zachariah Dowell Jr., MD;  Location: [facility] ENDO;  Service: Endoscopy;  Laterality: N/A;    INGUINAL HERNIA REPAIR Right 2011    VASECTOMY       Review of patient's allergies indicates:  No Known Allergies  Social History     Tobacco Use    Smoking status: Former     Average packs/day: 1 pack/day for 30.0 years (30.0 ttl pk-yrs)     Types: Cigars, Cigarettes     Start date: 10/17/1977     Quit date: 10/17/2007     Years since quittin.9    Smokeless tobacco: Never   Substance Use Topics    Alcohol use: Yes     Alcohol/week: 0.0 - 1.0 standard drinks of alcohol    Drug use: No     Family History   Problem Relation Name Age of Onset    Lung cancer Father      Alzheimer's disease Mother      Heart attack Paternal Grandfather      Prostate cancer Brother      Colon polyps Brother      Hemochromatosis Brother      Hemochromatosis Brother      Colon cancer Neg Hx      Crohn's  disease Neg Hx      Ulcerative colitis Neg Hx      Stomach cancer Neg Hx      Esophageal cancer Neg Hx      Glaucoma Neg Hx      Macular degeneration Neg Hx      Retinal detachment Neg Hx       Current Outpatient Medications on File Prior to Visit   Medication Sig Dispense Refill    amLODIPine (NORVASC) 5 MG tablet TAKE 1 TABLET DAILY 30 tablet 11    aspirin (ECOTRIN) 81 MG EC tablet Take 81 mg by mouth once daily.      blood sugar diagnostic Strp Test glucose 1 x daily 50 strip prn    ciclopirox (PENLAC) 8 % Soln Apply topically nightly. Apply to adjacent skin and affected nails daily.  Remove with alcohol every 7 days. 6.6 mL 11    EScitalopram oxalate (LEXAPRO) 10 MG tablet TAKE 1 TABLET DAILY 90 tablet 3    fluticasone propionate (FLONASE) 50 mcg/actuation nasal spray USE 1 SPRAY (50 MCG TOTAL) IN EACH NOSTRIL ONCE DAILY 32 mL 2    lancets Misc As directed 100 each prn    lisinopriL-hydrochlorothiazide (PRINZIDE,ZESTORETIC) 20-25 mg Tab Take 1 tablet by mouth once daily. 90 tablet 3    metFORMIN (GLUCOPHAGE-XR) 500 MG ER 24hr tablet TAKE 1 TABLET DAILY 90 tablet 1    nitroGLYCERIN (NITROSTAT) 0.4 MG SL tablet DISSOLVE 1 TABLET UNDER THE TONGUE EVERY 5 MINUTES AS NEEDED FOR CHEST PAIN. MAXIMUM 3 TABLETS. DO NOT TAKE IF USING VIAGRA 25 tablet 11    omega-3 fatty acids/fish oil (FISH OIL-OMEGA-3 FATTY ACIDS) 300-1,000 mg capsule Take by mouth once daily.      oxyCODONE (ROXICODONE) 10 mg Tab immediate release tablet       oxycodone-acetaminophen (PERCOCET)  mg per tablet Take 1 tablet by mouth 4 (four) times daily.  0    rosuvastatin (CRESTOR) 20 MG tablet TAKE 1 TABLET DAILY 90 tablet 3    sildenafil (VIAGRA) 100 MG tablet Take 1 tablet (100 mg total) by mouth daily as needed for Erectile Dysfunction (Don't take if using any nitroglycerin). 18 tablet 3    traZODone (DESYREL) 50 MG tablet TAKE 1 TABLET BY MOUTH NIGHTLY AS NEEDED FOR INSOMNIA. 90 tablet 1    zolpidem (AMBIEN) 5 MG Tab Take 1 tablet (5 mg  "total) by mouth every evening. 30 tablet 3    blood-glucose meter (FREESTYLE FREEDOM LITE) kit Use as instructed 1 each 11    ketoconazole (NIZORAL) 2 % cream Apply topically once daily. 60 g 1     No current facility-administered medications on file prior to visit.       Review of Systems   Constitutional:  Negative for chills, diaphoresis, fatigue and fever.   HENT:  Negative for congestion, ear pain, postnasal drip, sinus pain and sore throat.    Eyes:  Negative for pain and redness.   Respiratory:  Negative for cough, chest tightness and shortness of breath.    Cardiovascular:  Negative for chest pain and leg swelling.   Gastrointestinal:  Negative for abdominal pain, constipation, diarrhea, nausea and vomiting.   Genitourinary:  Positive for flank pain. Negative for dysuria, frequency, hematuria, penile discharge, penile pain, penile swelling, scrotal swelling, testicular pain and urgency.   Musculoskeletal:  Positive for back pain. Negative for arthralgias and joint swelling.   Skin:  Negative for rash.   Neurological:  Negative for dizziness, syncope and headaches.   Psychiatric/Behavioral:  Negative for dysphoric mood. The patient is not nervous/anxious.        Vitals:    10/04/24 0833   BP: (!) 151/88   Pulse: 77   Resp: 18   Weight: 120.3 kg (265 lb 4.8 oz)   Height: 6' 3" (1.905 m)       Wt Readings from Last 3 Encounters:   10/04/24 120.3 kg (265 lb 4.8 oz)   07/23/24 117 kg (258 lb)   05/28/24 113.4 kg (250 lb)       Physical Exam  Constitutional:       General: He is not in acute distress.     Appearance: Normal appearance. He is well-developed.   HENT:      Head: Normocephalic and atraumatic.   Eyes:      Conjunctiva/sclera: Conjunctivae normal.   Cardiovascular:      Rate and Rhythm: Normal rate and regular rhythm.      Pulses: Normal pulses.      Heart sounds: Normal heart sounds. No murmur heard.  Pulmonary:      Effort: Pulmonary effort is normal. No respiratory distress.      Breath sounds: " Normal breath sounds.   Abdominal:      General: Bowel sounds are normal. There is no distension.      Palpations: Abdomen is soft.      Tenderness: There is no abdominal tenderness. There is no right CVA tenderness or left CVA tenderness.      Comments: +R flank tenderness   Musculoskeletal:         General: Normal range of motion.      Cervical back: Normal range of motion and neck supple.      Lumbar back: Tenderness present. No swelling or deformity. Normal range of motion.   Skin:     General: Skin is warm and dry.      Findings: No rash.   Neurological:      General: No focal deficit present.      Mental Status: He is alert and oriented to person, place, and time.   Psychiatric:         Mood and Affect: Mood normal.         Behavior: Behavior normal.         Health Maintenance   Topic Date Due    Abdominal Aortic Aneurysm Screening  Never done    Hemoglobin A1c  02/07/2025    Foot Exam  07/23/2025    Lipid Panel  08/07/2025    Eye Exam  08/29/2025    High Dose Statin  10/04/2025    TETANUS VACCINE  03/06/2027    Colorectal Cancer Screening  06/02/2030    Hepatitis C Screening  Completed    Shingles Vaccine  Completed       DISCLAIMER: This note was compiled by using a speech recognition dictation system and therefore please be aware that typographical / speech recognition errors can and do occur.  Please contact me if you see any errors specifically.  Consent was obtained for [employer] recording system prior to the visit.

## 2024-10-04 NOTE — PATIENT INSTRUCTIONS
Riky Nguyễn,     If you are due for any health screening(s) below please notify me so we can arrange them to be ordered and scheduled. Most healthy patients at your age complete them, but you are free to accept or refuse.     If you can't do it, I'll definitely understand. If you can, I'd certainly appreciate it!    All of your core healthy metrics are met.      Lets manage your high blood pressure     Your blood pressure was above 140/90 today during your visit. We recommend that you schedule a nurse visit in two weeks to check your blood pressure and discuss ways to support your health goals.     You can also manage your health and record your blood pressure from the comfort of home by keeping a daily blood pressure log. These results are shared with and reviewed by your provider. Please print this form (Daily Blood Pressure Log) to assist you in keeping track of your blood pressure at home.     Schedule your nurse visit in two weeks to learn more about how to track and manage high blood pressure.    Daily Blood Pressure Log    Name:__________________________________                  Date of Birth:_________    Average Blood Pressure:  __________      Date: Time  (a.m.) Blood  Pressure: Pulse  Rate: Time  (p.m.) Blood  Pressure : Pulse  Rate:   Sample 8:37 127/83 84

## 2024-10-04 NOTE — PROGRESS NOTES
Results have been released via [employer]. Please verify that these have been viewed by patient. If not, please call patient with results.     I have sent a message to them with the following interpretation (see below).    I have reviewed your recent KUB XR which appears to be normal. There was no evidence of any calcifications. Your urinalysis was also normal. Low suspicion for kidney stones, however, if pain persists then we can proceed with a CT abd/pelvis as discussed at your visit.      Please do not hesitate to call or message with any additional questions or concerns.    Marcella Luu PA-C

## 2024-10-08 ENCOUNTER — TELEPHONE (OUTPATIENT)
Dept: CARDIOLOGY | Facility: CLINIC | Age: 64
End: 2024-10-08
Payer: OTHER GOVERNMENT

## 2024-10-08 NOTE — TELEPHONE ENCOUNTER
----- Message from Kalli sent at 10/8/2024 12:55 PM CDT -----  Contact: self   .Type:  Patient Returning Call    Who Called:patient   Who Left Message for Patient:  Does the patient know what this is regarding?:appt  Would the patient rather a call back or a response via [facility]? Call   Best Call Back Number:.[phone]    Additional Information:

## 2024-10-08 NOTE — TELEPHONE ENCOUNTER
Attempted unsuccessfully X 2 to reach patient. Left voicemail and Mychart message for patient to call back to discuss  appointment

## 2024-10-08 NOTE — TELEPHONE ENCOUNTER
"LM for pt to call us back  -- last seen 2021 with Dr Doyle- would be NP  -- scheduled 10/22 but pt unaware    Thanks     ----- Message from María sent at 10/8/2024 11:05 AM CDT -----  Pt Requesting to Schedule an Appointment     Pt is requesting to schedule an appointment that our scheduling dept cannot schedule.    Who called: pt requested this appt via [facility]  Best call back #: [phone]  When pt wants appt: "10/11/2024  anytime"    Reason for appt: "Rx needs refill - statin. Annual cardio check up."    Additional notes: pt mentioned that he is an established pt but I did not see where pt seen Dr. Parnell  within the last 3 years; since pt would be a new pt, [facility] notes for Dr. Parnell said to send this message if new pt is not able to get an appt within two weeks (next available was 10/23/24); I, , confirmed with ROLA Muro and she let me know that pt is not an established pt  "

## 2024-10-09 DIAGNOSIS — E66.01 SEVERE OBESITY WITH BODY MASS INDEX (BMI) OF 35.0 TO 39.9 WITH COMORBIDITY: ICD-10-CM

## 2024-10-09 DIAGNOSIS — E11.59 HYPERTENSION ASSOCIATED WITH DIABETES: ICD-10-CM

## 2024-10-09 DIAGNOSIS — G47.33 OBSTRUCTIVE SLEEP APNEA SYNDROME: ICD-10-CM

## 2024-10-09 DIAGNOSIS — E78.5 HYPERLIPIDEMIA ASSOCIATED WITH TYPE 2 DIABETES MELLITUS: ICD-10-CM

## 2024-10-09 DIAGNOSIS — E11.69 HYPERLIPIDEMIA ASSOCIATED WITH TYPE 2 DIABETES MELLITUS: ICD-10-CM

## 2024-10-09 DIAGNOSIS — I15.2 HYPERTENSION ASSOCIATED WITH DIABETES: ICD-10-CM

## 2024-10-09 DIAGNOSIS — I25.2 MYOCARDIAL INFARCT, OLD: ICD-10-CM

## 2024-10-09 DIAGNOSIS — I25.10 MILD CAD: ICD-10-CM

## 2024-10-09 RX ORDER — ROSUVASTATIN CALCIUM 20 MG/1
20 TABLET, COATED ORAL DAILY
Qty: 30 TABLET | Refills: 0 | Status: SHIPPED | OUTPATIENT
Start: 2024-10-09

## 2024-10-21 DIAGNOSIS — Z76.89 ENCOUNTER TO ESTABLISH CARE: Primary | ICD-10-CM

## 2024-10-22 ENCOUNTER — HOSPITAL ENCOUNTER (OUTPATIENT)
Dept: CARDIOLOGY | Facility: HOSPITAL | Age: 64
Discharge: HOME | End: 2024-10-22
Attending: INTERNAL MEDICINE
Payer: MEDICARE

## 2024-10-22 ENCOUNTER — OFFICE VISIT (OUTPATIENT)
Dept: CARDIOLOGY | Facility: CLINIC | Age: 64
End: 2024-10-22
Payer: MEDICARE

## 2024-10-22 VITALS
BODY MASS INDEX: 32.95 KG/M2 | SYSTOLIC BLOOD PRESSURE: 150 MMHG | DIASTOLIC BLOOD PRESSURE: 90 MMHG | HEART RATE: 64 BPM | WEIGHT: 265 LBS | HEIGHT: 75 IN

## 2024-10-22 DIAGNOSIS — E11.9 TYPE 2 DIABETES MELLITUS WITHOUT COMPLICATION, WITHOUT LONG-TERM CURRENT USE OF INSULIN: ICD-10-CM

## 2024-10-22 DIAGNOSIS — E78.5 HYPERLIPIDEMIA ASSOCIATED WITH TYPE 2 DIABETES MELLITUS: ICD-10-CM

## 2024-10-22 DIAGNOSIS — G47.33 OBSTRUCTIVE SLEEP APNEA SYNDROME: ICD-10-CM

## 2024-10-22 DIAGNOSIS — Z76.89 ENCOUNTER TO ESTABLISH CARE: ICD-10-CM

## 2024-10-22 DIAGNOSIS — E66.01 SEVERE OBESITY WITH BODY MASS INDEX (BMI) OF 35.0 TO 39.9 WITH COMORBIDITY: ICD-10-CM

## 2024-10-22 DIAGNOSIS — E11.69 HYPERLIPIDEMIA ASSOCIATED WITH TYPE 2 DIABETES MELLITUS: ICD-10-CM

## 2024-10-22 DIAGNOSIS — I25.2 MYOCARDIAL INFARCT, OLD: ICD-10-CM

## 2024-10-22 DIAGNOSIS — I15.2 HYPERTENSION ASSOCIATED WITH DIABETES: ICD-10-CM

## 2024-10-22 DIAGNOSIS — I25.10 MILD CAD: ICD-10-CM

## 2024-10-22 DIAGNOSIS — E11.59 HYPERTENSION ASSOCIATED WITH DIABETES: ICD-10-CM

## 2024-10-22 PROCEDURE — 93010 ELECTROCARDIOGRAM REPORT: CPT | Mod: ,,, | Performed by: INTERNAL MEDICINE

## 2024-10-22 PROCEDURE — 99204 OFFICE O/P NEW MOD 45 MIN: CPT | Mod: S$PBB,25,, | Performed by: INTERNAL MEDICINE

## 2024-10-22 PROCEDURE — 99999 PR PBB SHADOW E&M-EST. PATIENT-LVL III: CPT | Mod: PBBFAC,,, | Performed by: INTERNAL MEDICINE

## 2024-10-22 PROCEDURE — 93005 ELECTROCARDIOGRAM TRACING: CPT | Mod: PO

## 2024-10-22 PROCEDURE — 99213 OFFICE O/P EST LOW 20 MIN: CPT | Mod: PBBFAC,PO,25 | Performed by: INTERNAL MEDICINE

## 2024-10-22 RX ORDER — AMLODIPINE BESYLATE 5 MG/1
5 TABLET ORAL DAILY
Qty: 90 TABLET | Refills: 3 | Status: SHIPPED | OUTPATIENT
Start: 2024-10-22

## 2024-10-22 RX ORDER — ROSUVASTATIN CALCIUM 20 MG/1
20 TABLET, COATED ORAL DAILY
Qty: 90 TABLET | Refills: 3 | Status: SHIPPED | OUTPATIENT
Start: 2024-10-22

## 2024-10-22 NOTE — PROGRESS NOTES
Subjective   Patient ID:  Gopi Garcia is a 64 y.o. male who presents for evaluation of [facility] Care      HPI64 yo WM with HTN, HLD and hx of NSTEMI years ago. No intervention. Denies chest pain, SOB, or edema  Denies palpitations, weak spells, and syncope   Here today for med refills.     Review of Systems   Constitutional: Negative for decreased appetite, fever, malaise/fatigue, weight gain and weight loss.   HENT:  Negative for hearing loss and nosebleeds.    Eyes:  Negative for visual disturbance.   Cardiovascular:  Negative for chest pain, claudication, cyanosis, dyspnea on exertion, irregular heartbeat, leg swelling, near-syncope, orthopnea, palpitations, paroxysmal nocturnal dyspnea and syncope.   Respiratory:  Negative for cough, hemoptysis, shortness of breath, sleep disturbances due to breathing, snoring and wheezing.    Endocrine: Negative for cold intolerance, heat intolerance, polydipsia and polyuria.   Hematologic/Lymphatic: Negative for adenopathy and bleeding problem. Does not bruise/bleed easily.   Skin:  Negative for color change, itching, poor wound healing, rash and suspicious lesions.   Musculoskeletal:  Positive for back pain. Negative for arthritis, falls, joint pain, joint swelling, muscle cramps, muscle weakness and myalgias.   Gastrointestinal:  Negative for bloating, abdominal pain, change in bowel habit, constipation, flatus, heartburn, hematemesis, hematochezia, hemorrhoids, jaundice, melena, nausea and vomiting.   Genitourinary:  Negative for bladder incontinence, decreased libido, frequency, hematuria, hesitancy and urgency.   Neurological:  Negative for brief paralysis, difficulty with concentration, excessive daytime sleepiness, dizziness, focal weakness, headaches, light-headedness, loss of balance, numbness, vertigo and weakness.   Psychiatric/Behavioral:  Negative for altered mental status, depression and memory loss. The patient does not have insomnia and is not  "nervous/anxious.    Allergic/Immunologic: Negative for environmental allergies, hives and persistent infections.          Objective     Physical Exam  Constitutional:       General: He is not in acute distress.     Appearance: He is well-developed. He is not diaphoretic.      Comments: BP (!) 150/90 (BP Location: Left arm, Patient Position: Sitting)   Pulse 64   Ht 6' 3" (1.905 m)   Wt 120.2 kg (265 lb)   BMI 33.12 kg/m²      HENT:      Head: Normocephalic and atraumatic.   Eyes:      General: Lids are normal. No scleral icterus.        Right eye: No discharge.      Conjunctiva/sclera: Conjunctivae normal.      Pupils: Pupils are equal, round, and reactive to light.   Neck:      Thyroid: No thyromegaly.      Vascular: No JVD.      Trachea: No tracheal deviation.   Cardiovascular:      Rate and Rhythm: Normal rate and regular rhythm.      Pulses: Intact distal pulses.           Carotid pulses are 2+ on the right side and 2+ on the left side.       Radial pulses are 2+ on the right side and 2+ on the left side.        Femoral pulses are 2+ on the right side and 2+ on the left side.       Popliteal pulses are 2+ on the right side and 2+ on the left side.        Dorsalis pedis pulses are 2+ on the right side and 2+ on the left side.        Posterior tibial pulses are 2+ on the right side and 2+ on the left side.      Heart sounds: Normal heart sounds, S1 normal and S2 normal. No murmur heard.     No friction rub. No gallop.   Pulmonary:      Effort: Pulmonary effort is normal. No respiratory distress.      Breath sounds: Normal breath sounds. No wheezing or rales.   Chest:      Chest wall: No tenderness.   Abdominal:      General: Bowel sounds are normal. There is no distension.      Palpations: Abdomen is soft. There is no hepatomegaly or mass.      Tenderness: There is no abdominal tenderness. There is no guarding or rebound.   Musculoskeletal:         General: No tenderness. Normal range of motion.      Cervical " back: Normal range of motion and neck supple.   Lymphadenopathy:      Cervical: No cervical adenopathy.   Skin:     General: Skin is warm and dry.      Coloration: Skin is not pale.      Findings: No erythema or rash.   Neurological:      Mental Status: He is alert and oriented to person, place, and time.      Cranial Nerves: No cranial nerve deficit.      Coordination: Coordination normal.      Deep Tendon Reflexes: Reflexes are normal and symmetric.   Psychiatric:         Speech: Speech normal.         Behavior: Behavior normal.         Thought Content: Thought content normal.         Judgment: Judgment normal.            Assessment and Plan     1. Myocardial infarct, old  no previous intervention   2. Mild CAD    3. Hypertension associated with diabetes States BP normally in range   4. Hyperlipidemia associated with type 2 diabetes mellitus controlled   5. Type 2 diabetes mellitus without complication, without long-term current use of insulin    6.    7. Obstructive sleep apnea syndrome recommend compliance with CPAP       Plan:  Low salt diet  Patient advised to modify risk factors such as weight, exercise, diet,  tobacco and alcohol exposure    Monitor BP at home and if elevated notify us   No orders of the defined types were placed in this encounter.    Follow up in about 1 year (around 10/22/2025).     Advance Care Planning     Date: 10/22/2024

## 2024-10-23 LAB
OHS QRS DURATION: 98 MS
OHS QTC CALCULATION: 396 MS

## 2024-11-06 DIAGNOSIS — E11.69 HYPERLIPIDEMIA ASSOCIATED WITH TYPE 2 DIABETES MELLITUS: ICD-10-CM

## 2024-11-06 DIAGNOSIS — I25.10 MILD CAD: ICD-10-CM

## 2024-11-06 DIAGNOSIS — E78.5 HYPERLIPIDEMIA ASSOCIATED WITH TYPE 2 DIABETES MELLITUS: ICD-10-CM

## 2024-11-06 DIAGNOSIS — G47.33 OBSTRUCTIVE SLEEP APNEA SYNDROME: ICD-10-CM

## 2024-11-06 DIAGNOSIS — I25.2 MYOCARDIAL INFARCT, OLD: ICD-10-CM

## 2024-11-06 DIAGNOSIS — E66.01 SEVERE OBESITY WITH BODY MASS INDEX (BMI) OF 35.0 TO 39.9 WITH COMORBIDITY: ICD-10-CM

## 2024-11-06 DIAGNOSIS — I15.2 HYPERTENSION ASSOCIATED WITH DIABETES: ICD-10-CM

## 2024-11-06 DIAGNOSIS — E11.59 HYPERTENSION ASSOCIATED WITH DIABETES: ICD-10-CM

## 2024-11-07 RX ORDER — ROSUVASTATIN CALCIUM 20 MG/1
20 TABLET, COATED ORAL
Qty: 30 TABLET | Refills: 11 | Status: SHIPPED | OUTPATIENT
Start: 2024-11-07

## 2025-02-03 ENCOUNTER — OFFICE VISIT (OUTPATIENT)
Dept: PULMONOLOGY | Facility: CLINIC | Age: 65
End: 2025-02-03
Payer: MEDICARE

## 2025-02-03 VITALS — WEIGHT: 260 LBS | BODY MASS INDEX: 32.33 KG/M2 | HEIGHT: 75 IN

## 2025-02-03 DIAGNOSIS — F51.02 INSOMNIA DUE TO PSYCHOLOGICAL STRESS: ICD-10-CM

## 2025-02-03 DIAGNOSIS — J43.2 CENTRILOBULAR EMPHYSEMA: Primary | ICD-10-CM

## 2025-02-03 DIAGNOSIS — E78.5 HYPERLIPIDEMIA ASSOCIATED WITH TYPE 2 DIABETES MELLITUS: ICD-10-CM

## 2025-02-03 DIAGNOSIS — E11.59 HYPERTENSION ASSOCIATED WITH DIABETES: ICD-10-CM

## 2025-02-03 DIAGNOSIS — I15.2 HYPERTENSION ASSOCIATED WITH DIABETES: ICD-10-CM

## 2025-02-03 DIAGNOSIS — R91.1 PULMONARY NODULE, LEFT: ICD-10-CM

## 2025-02-03 DIAGNOSIS — E11.9 TYPE 2 DIABETES MELLITUS WITHOUT COMPLICATION, WITHOUT LONG-TERM CURRENT USE OF INSULIN: ICD-10-CM

## 2025-02-03 DIAGNOSIS — Z87.891 PERSONAL HISTORY OF NICOTINE DEPENDENCE: ICD-10-CM

## 2025-02-03 DIAGNOSIS — E66.811 CLASS 1 OBESITY WITH SERIOUS COMORBIDITY AND BODY MASS INDEX (BMI) OF 32.0 TO 32.9 IN ADULT, UNSPECIFIED OBESITY TYPE: ICD-10-CM

## 2025-02-03 DIAGNOSIS — E11.69 HYPERLIPIDEMIA ASSOCIATED WITH TYPE 2 DIABETES MELLITUS: ICD-10-CM

## 2025-02-03 DIAGNOSIS — G47.33 OSA (OBSTRUCTIVE SLEEP APNEA): ICD-10-CM

## 2025-02-03 PROCEDURE — 98006 SYNCH AUDIO-VIDEO EST MOD 30: CPT | Mod: 95,,, | Performed by: INTERNAL MEDICINE

## 2025-02-03 RX ORDER — ZOLPIDEM TARTRATE 5 MG/1
5 TABLET ORAL NIGHTLY
Qty: 30 TABLET | Refills: 4 | Status: SHIPPED | OUTPATIENT
Start: 2025-02-03 | End: 2025-06-03

## 2025-02-03 NOTE — ASSESSMENT & PLAN NOTE
2/3/2025   EPWORTH SLEEPINESS SCALE   Sitting and reading 1    Watching TV 1    Sitting, inactive in a public place (e.g. a theatre or a meeting) 0    As a passenger in a car for an hour without a break 0    Lying down to rest in the afternoon when circumstances permit 3    Sitting and talking to someone 0    Sitting quietly after a lunch without alcohol 0    In a car, while stopped for a few minutes in traffic 0    Total score 5        Patient-reported        Data [id number]   Usage greater than 4 hours was 96.7%   AHI 1.4   APAP 6-14 cm water pressure

## 2025-02-03 NOTE — ASSESSMENT & PLAN NOTE
On Ambien and Trazodone  Occasional sleep disruption spontaneous or due to nocturia    Requested Prescriptions     Signed Prescriptions Disp Refills    zolpidem (AMBIEN) 5 MG Tab 30 tablet 4     Sig: Take 1 tablet (5 mg total) by mouth every evening.         reviewed

## 2025-02-03 NOTE — PROGRESS NOTES
The patient location is: [facility]  The chief complaint leading to consultation is:   Chief Complaint   Patient presents with    Apnea         Visit type: audiovisual    Face to Face time with patient: 12  mins  30 minutes of total time spent on the encounter, which includes face to face time and non-face to face time preparing to see the patient (eg, review of tests), Obtaining and/or reviewing separately obtained history, Documenting clinical information in the electronic or other health record, Independently interpreting results (not separately reported) and communicating results to the patient/family/caregiver, or Care coordination (not separately reported).         Each patient to whom he or she provides medical services by telemedicine is:  (1) informed of the relationship between the physician and patient and the respective role of any other health care provider with respect to management of the patient; and (2) notified that he or she may decline to receive medical services by telemedicine and may withdraw from such care at any time.    Notes:                                           Pulmonary Outpatient  Visit     Subjective:       Patient ID: Gopi Garcia is a 64 y.o. male.    Social History     Tobacco Use   Smoking Status Former    Average packs/day: 1 pack/day for 30.0 years (30.0 ttl pk-yrs)    Types: Cigars, Cigarettes    Start date: 10/17/1977    Quit date: 10/17/2007    Years since quittin.3   Smokeless Tobacco Never            Chief Complaint: Apnea      Gopi Garcia is 62 y.o.  Referred by Paul Spencer MD  Last seen 2019: DASHA on CPAP  Prior records reveiwed  Last LDCT: emphysematous changes  30 Pack year smoking Hx quit   Smoke Cigars  [employer] [facility] PHD in education  Stress: trouble with sleep  Recently given Trazadone: caused nasal congestion  Asks for ambien worked well on past  CPAP data > 4 hrs was 96.75  AHI 1.3            The diagnostic polysomnography  revealed a severe obstructive sleep apnea / hypopnea syndrome (A + H Index = 53.5 events  / hr asleep with 23.4 arousals / hr asleep for the study, and a mean SpO2 value of 92.7 %, moderate, minimum oxygen  saturation during sleep of 83.0 %, and waking baseline SpO2 = 97 %. There were no RERAs (respiratory effort- related  arousals). Sporadic, mild to moderately loud snoring was noted.  2. CPAP was initiated at 1:19 am. The titration polysomnography revealed that though 8 cm, 9 cm and 10 cm were each  completely tested and ineffective (AHI= 14.3, 14.4 and 21.9 respectively), and each had a high rate of central sleep apneas  predominating; Note too that central and mixed apneas did occur pre - CPAP, but did not predominate. The overall A + H  Index was 15.4 / hr asleep, with only 0.5 respiratory event - related arousals / hr asleep for the titration trial (there were no  RERAs). The mean SpO2 value was 94.2 % throughout the study, with a minimum oxygen saturation during sleep of 88.0 %  (waking baseline SpO2 was 96 %). Snoring was soft to absent at 10 cm, but was not eliminated. The patient's own OMGPOP nasal CPAP mask was used and was well - tolerated. Please see PAP trial outcomes table, below.      07/27/2023  Followup visit  Concern  Recently started Ambien: gets to sleep  Duration not satisfactory  Bed time: 10-11 pm  Wake time: 6-7 am  On Ambien 5 mg  Fatigue during day  Using CPAP    05/28/2024  Followup  Insomnia  On Trazodone and Ambien   Has been off ambien 2 months  On CPAP  Bed time: 10-11 pm: up at 02:30 am and cannot get back to sleep  Wake time: 4-5 am  Has daytime sleepiness, takes nap  In school pursuing doctorate    10/01/2024  Follow-up visit  Doing well  On CPAP  Bed time: 10 pm  Wake time: 5 am  On Ambien: works  Occasional nocturia 02:30 am  Using CPAP  Mask nasal      02/03/2025   Follow-up visit   Last LDCT  Lungs: Stable 3 mm pulmonary nodule within the left lower lobe (series 4,  image 240).  No new or enlarging pulmonary nodules.  Scattered calcified granulomas redemonstrated.  Mild upper lobe predominant pulmonary emphysema.  No consolidation.  Doing well  On CPAP   Bed time 10 pm  Wake time  Ambien 5 mg and Trazodone 50 mg   reviewed  Trouble staying asleep  Wake up after 3 hrs : refreshed   Not interested with CBTI                Review of Systems   All other systems reviewed and are negative.      Outpatient Encounter Medications as of 2/3/2025   Medication Sig Dispense Refill    amLODIPine (NORVASC) 5 MG tablet Take 1 tablet (5 mg total) by mouth once daily. 90 tablet 3    aspirin (ECOTRIN) 81 MG EC tablet Take 81 mg by mouth once daily.      blood sugar diagnostic Strp Test glucose 1 x daily 50 strip prn    ciclopirox (PENLAC) 8 % Soln Apply topically nightly. Apply to adjacent skin and affected nails daily.  Remove with alcohol every 7 days. 6.6 mL 11    cyclobenzaprine (FLEXERIL) 10 MG tablet Take 1 tablet (10 mg total) by mouth 3 (three) times daily as needed for Muscle spasms. 30 tablet 0    EScitalopram oxalate (LEXAPRO) 10 MG tablet TAKE 1 TABLET DAILY 90 tablet 3    fluticasone propionate (FLONASE) 50 mcg/actuation nasal spray USE 1 SPRAY (50 MCG TOTAL) IN EACH NOSTRIL ONCE DAILY 32 mL 2    lancets Misc As directed 100 each prn    lisinopriL-hydrochlorothiazide (PRINZIDE,ZESTORETIC) 20-25 mg Tab Take 1 tablet by mouth once daily. 90 tablet 3    metFORMIN (GLUCOPHAGE-XR) 500 MG ER 24hr tablet TAKE 1 TABLET DAILY 90 tablet 1    nitroGLYCERIN (NITROSTAT) 0.4 MG SL tablet DISSOLVE 1 TABLET UNDER THE TONGUE EVERY 5 MINUTES AS NEEDED FOR CHEST PAIN. MAXIMUM 3 TABLETS. DO NOT TAKE IF USING VIAGRA 25 tablet 11    omega-3 fatty acids/fish oil (FISH OIL-OMEGA-3 FATTY ACIDS) 300-1,000 mg capsule Take by mouth once daily.      oxyCODONE (ROXICODONE) 10 mg Tab immediate release tablet       oxycodone-acetaminophen (PERCOCET)  mg per tablet Take 1 tablet by mouth 4 (four) times  daily.  0    rosuvastatin (CRESTOR) 20 MG tablet TAKE 1 TABLET DAILY 30 tablet 11    sildenafil (VIAGRA) 100 MG tablet Take 1 tablet (100 mg total) by mouth daily as needed for Erectile Dysfunction (Don't take if using any nitroglycerin). 18 tablet 3    traZODone (DESYREL) 50 MG tablet TAKE 1 TABLET BY MOUTH NIGHTLY AS NEEDED FOR INSOMNIA. 90 tablet 1    blood-glucose meter (FREESTYLE FREEDOM LITE) kit Use as instructed 1 each 11    ketoconazole (NIZORAL) 2 % cream Apply topically once daily. 60 g 1    zolpidem (AMBIEN) 5 MG Tab Take 1 tablet (5 mg total) by mouth every evening. 30 tablet 4    [DISCONTINUED] zolpidem (AMBIEN) 5 MG Tab Take 1 tablet (5 mg total) by mouth every evening. 30 tablet 3     No facility-administered encounter medications on file as of 2/3/2025.       The following portions of the patient's history were reviewed and updated as appropriate: He  has a past medical history of Anxiety, AR (allergic rhinitis), CAD (coronary artery disease), Carrier of hemochromatosis HFE gene mutation, Centrilobular emphysema (07/23/2024), Chronic low back pain, Colon polyp (2010), Coronary artery disease, Depression, Diverticulosis, ED (erectile dysfunction), Fatty liver, Hepatomegaly, HTN (hypertension), Hyperlipidemia, Kidney stone (2009), Myocardial infarct, old, Pulmonary nodule, left (01/02/2020), Sleep apnea, and Type 2 diabetes mellitus without complication, without long-term current use of insulin (06/08/2021).  He does not have any pertinent problems on file.  He  has a past surgical history that includes Inguinal hernia repair (Right, 2011); Vasectomy (2005); Cardiac catheterization (2006); Colonoscopy (49 years old); and Colonoscopy (N/A, 6/2/2020).  His family history includes Alzheimer's disease in his mother; Colon polyps in his brother; Heart attack in his paternal grandfather; Hemochromatosis in his brother and brother; Lung cancer in his father; Prostate cancer in his brother.  He  reports that  he quit smoking about 17 years ago. His smoking use included cigars and cigarettes. He started smoking about 47 years ago. He has a 30 pack-year smoking history. He has never used smokeless tobacco. He reports current alcohol use. He reports that he does not use drugs.  He has a current medication list which includes the following prescription(s): amlodipine, aspirin, blood sugar diagnostic, ciclopirox, cyclobenzaprine, escitalopram oxalate, fluticasone propionate, lancets, lisinopril-hydrochlorothiazide, metformin, nitroglycerin, fish oil-omega-3 fatty acids, oxycodone, oxycodone-acetaminophen, rosuvastatin, sildenafil, trazodone, blood-glucose meter, ketoconazole, and zolpidem.  Current Outpatient Medications on File Prior to Visit   Medication Sig Dispense Refill    amLODIPine (NORVASC) 5 MG tablet Take 1 tablet (5 mg total) by mouth once daily. 90 tablet 3    aspirin (ECOTRIN) 81 MG EC tablet Take 81 mg by mouth once daily.      blood sugar diagnostic Strp Test glucose 1 x daily 50 strip prn    ciclopirox (PENLAC) 8 % Soln Apply topically nightly. Apply to adjacent skin and affected nails daily.  Remove with alcohol every 7 days. 6.6 mL 11    cyclobenzaprine (FLEXERIL) 10 MG tablet Take 1 tablet (10 mg total) by mouth 3 (three) times daily as needed for Muscle spasms. 30 tablet 0    EScitalopram oxalate (LEXAPRO) 10 MG tablet TAKE 1 TABLET DAILY 90 tablet 3    fluticasone propionate (FLONASE) 50 mcg/actuation nasal spray USE 1 SPRAY (50 MCG TOTAL) IN EACH NOSTRIL ONCE DAILY 32 mL 2    lancets Misc As directed 100 each prn    lisinopriL-hydrochlorothiazide (PRINZIDE,ZESTORETIC) 20-25 mg Tab Take 1 tablet by mouth once daily. 90 tablet 3    metFORMIN (GLUCOPHAGE-XR) 500 MG ER 24hr tablet TAKE 1 TABLET DAILY 90 tablet 1    nitroGLYCERIN (NITROSTAT) 0.4 MG SL tablet DISSOLVE 1 TABLET UNDER THE TONGUE EVERY 5 MINUTES AS NEEDED FOR CHEST PAIN. MAXIMUM 3 TABLETS. DO NOT TAKE IF USING VIAGRA 25 tablet 11    omega-3  fatty acids/fish oil (FISH OIL-OMEGA-3 FATTY ACIDS) 300-1,000 mg capsule Take by mouth once daily.      oxyCODONE (ROXICODONE) 10 mg Tab immediate release tablet       oxycodone-acetaminophen (PERCOCET)  mg per tablet Take 1 tablet by mouth 4 (four) times daily.  0    rosuvastatin (CRESTOR) 20 MG tablet TAKE 1 TABLET DAILY 30 tablet 11    sildenafil (VIAGRA) 100 MG tablet Take 1 tablet (100 mg total) by mouth daily as needed for Erectile Dysfunction (Don't take if using any nitroglycerin). 18 tablet 3    traZODone (DESYREL) 50 MG tablet TAKE 1 TABLET BY MOUTH NIGHTLY AS NEEDED FOR INSOMNIA. 90 tablet 1    blood-glucose meter (FREESTYLE FREEDOM LITE) kit Use as instructed 1 each 11    ketoconazole (NIZORAL) 2 % cream Apply topically once daily. 60 g 1    [DISCONTINUED] zolpidem (AMBIEN) 5 MG Tab Take 1 tablet (5 mg total) by mouth every evening. 30 tablet 3     No current facility-administered medications on file prior to visit.     He has No Known Allergies..      BP Readings from Last 3 Encounters:   10/22/24 (!) 150/90   10/04/24 (!) 151/88   07/23/24 131/80     Snoring / Sleep:          MMRC Dyspnea Scale (4 is worst)     [] MMRC 0: Dyspneic on strenuous excercise (0 points)    [] MMRC 1: Dyspneic on walking a slight hill (0 points)    [] MMRC 2: Dyspneic on walking level ground; must stop occasionally due to breathlessness (1 point)    [] MMRC 3: Must stop for breathlessness after walking 100 yards or after a few minutes (2 points)    [] MMRC 4: Cannot leave house; breathless on dressing/undressing (3 points)          2/3/2025   EPWORTH SLEEPINESS SCALE   Sitting and reading 1    Watching TV 1    Sitting, inactive in a public place (e.g. a theatre or a meeting) 0    As a passenger in a car for an hour without a break 0    Lying down to rest in the afternoon when circumstances permit 3    Sitting and talking to someone 0    Sitting quietly after a lunch without alcohol 0    In a car, while stopped for a few  "minutes in traffic 0    Total score 5        Patient-reported                 Objective:     Vital Signs (Most Recent)  Height and Weight  Height: 6' 3" (190.5 cm)  Weight: 117.9 kg (260 lb)  BSA (Calculated - sq m): 2.5 sq meters  BMI (Calculated): 32.5  Weight in (lb) to have BMI = 25: 199.6]  Wt Readings from Last 2 Encounters:   02/03/25 117.9 kg (260 lb)   10/22/24 120.2 kg (265 lb)       Physical Exam  Vitals and nursing note reviewed.   HENT:      Head: Normocephalic.   Eyes:      Pupils: Pupils are equal, round, and reactive to light.   Neurological:      Mental Status: He is alert and oriented to person, place, and time.          Laboratory  Lab Results   Component Value Date    WBC 9.02 08/07/2024    RBC 4.71 08/07/2024    HGB 15.0 08/07/2024    HCT 45.7 08/07/2024    MCV 97 08/07/2024    MCH 31.8 (H) 08/07/2024    MCHC 32.8 08/07/2024    RDW 12.6 08/07/2024     08/07/2024    MPV 10.5 08/07/2024    GRAN 6.5 08/07/2024    GRAN 71.7 08/07/2024    LYMPH 2.0 08/07/2024    LYMPH 21.6 08/07/2024    MONO 0.4 08/07/2024    MONO 4.9 08/07/2024    EOS 0.1 08/07/2024    BASO 0.05 08/07/2024    EOSINOPHIL 1.0 08/07/2024    BASOPHIL 0.6 08/07/2024       BMP  Lab Results   Component Value Date     08/07/2024    K 4.2 08/07/2024     08/07/2024    CO2 23 08/07/2024    BUN 10 08/07/2024    CREATININE 1.0 08/07/2024    CALCIUM 9.5 08/07/2024    ANIONGAP 13 08/07/2024    ESTGFRAFRICA >60.0 12/09/2021    EGFRNONAA >60.0 12/09/2021    AST 13 08/07/2024    ALT 7 (L) 08/07/2024    PROT 6.5 08/07/2024          No results found for: "IGE"     No results found for: "ASPERGILLUS"  No results found for: "AFUMIGATUSCL"     No results found for: "ACE"     Diagnostic Results:  I have personally reviewed today the following studies:    X-Ray KUB  Narrative: EXAMINATION:  XR KUB    CLINICAL HISTORY:  Unspecified abdominal pain    TECHNIQUE:  Single AP supine view of the abdomen (KUB) was " performed    COMPARISON:  None    FINDINGS:  The bowel gas pattern is nonspecific and nonobstructive.  No pathologic calcifications are identified.  Impression: As above    Electronically signed by: Chandrakant Key DO  Date:    10/04/2024  Time:    09:11     Compliance Information 1/4/2025 - 2/2/2025  Compliance Summary  1/4/2025 - 2/2/2025 (30 days)  Days with Device Usage 30 days  Days without Device Usage 0 days  Percent Days with Device Usage 100.0%  Cumulative Usage 9 days 10 hrs. 3 mins. 56 secs.  Maximum Usage (1 Day) 10 hrs. 3 mins. 53 secs.  Average Usage (All Days) 7 hrs. 32 mins. 7 secs.  Average Usage (Days Used) 7 hrs. 32 mins. 7 secs.  Minimum Usage (1 Day) 3 hrs. 52 mins. 19 secs.  Percent of Days with Usage >= 4 Hours 96.7%  Percent of Days with Usage < 4 Hours 3.3%  Date Range  Total Blower Time 9 days 14 hrs. 12 mins. 44 secs.  Average AHI 1.4  Auto-CPAP Summary  Auto-CPAP Mean Pressure 6.9 cmH2O  Auto-CPAP Peak Average Pressure 9.0 cmH2O  Device Pressure <= 90% of Time 8.2 cmH2O  Average Time in Large Leak Per Day 34 secs.      Assessment/Plan:     Problem List Items Addressed This Visit       Hypertension associated with diabetes    Hyperlipidemia associated with type 2 diabetes mellitus    Type 2 diabetes mellitus without complication, without long-term current use of insulin    Class 1 obesity with serious comorbidity and body mass index (BMI) of 32.0 to 32.9 in adult    DASHA (obstructive sleep apnea)         2/3/2025   EPWORTH SLEEPINESS SCALE   Sitting and reading 1    Watching TV 1    Sitting, inactive in a public place (e.g. a theatre or a meeting) 0    As a passenger in a car for an hour without a break 0    Lying down to rest in the afternoon when circumstances permit 3    Sitting and talking to someone 0    Sitting quietly after a lunch without alcohol 0    In a car, while stopped for a few minutes in traffic 0    Total score 5        Patient-reported        Data [id number]   Usage  greater than 4 hours was 96.7%   AHI 1.4   APAP 6-14 cm water pressure         Pulmonary nodule, left     Emphysema changes   Needs spirometry   Annual low-dose screening CT scan         Relevant Orders    CT Chest Lung Screening Low Dose    Insomnia due to psychological stress     On Ambien and Trazodone  Occasional sleep disruption spontaneous or due to nocturia    Requested Prescriptions     Signed Prescriptions Disp Refills    zolpidem (AMBIEN) 5 MG Tab 30 tablet 4     Sig: Take 1 tablet (5 mg total) by mouth every evening.         reviewed           Relevant Medications    zolpidem (AMBIEN) 5 MG Tab    Centrilobular emphysema - Primary     We will need to come in for spirometry         Relevant Orders    Complete PFT with bronchodilator    CT Chest Lung Screening Low Dose     Other Visit Diagnoses       Personal history of nicotine dependence        Relevant Orders    CT Chest Lung Screening Low Dose           Meds refill  Ambien worked well         The patient was given open opportunity to ask questions and/or express concerns about treatment plan.   All questions/concerns were discussed.     Follow up in about 4 months (around 6/3/2025), or LDCT, PFT, Smokig cessation, sleep diary.    This note was prepared using voice recognition system and is likely to have sound alike errors that may have been overlooked even after proof reading.  Please call me with any questions    Discussed diagnosis, its evaluation, treatment and usual course. All questions answered.    Thank you for the courtesy of participating in the care of this patient    Philip Hays MD      Personal Diagnostic Review  [x]  LDCT    [x]  [place]    [x]  Download    []  6MWD    []  LABS    []  CHEST CT    []  PET CT    []  Biopsy results

## 2025-02-07 ENCOUNTER — LAB VISIT (OUTPATIENT)
Dept: LAB | Facility: HOSPITAL | Age: 65
End: 2025-02-07
Attending: FAMILY MEDICINE
Payer: MEDICARE

## 2025-02-07 DIAGNOSIS — Z12.5 SCREENING FOR PROSTATE CANCER: ICD-10-CM

## 2025-02-07 DIAGNOSIS — E11.9 TYPE 2 DIABETES MELLITUS WITHOUT COMPLICATION, WITHOUT LONG-TERM CURRENT USE OF INSULIN: ICD-10-CM

## 2025-02-07 LAB
COMPLEXED PSA SERPL-MCNC: 0.78 NG/ML (ref 0–4)
ESTIMATED AVG GLUCOSE: 123 MG/DL (ref 68–131)
HBA1C MFR BLD: 5.9 % (ref 4–5.6)

## 2025-02-07 PROCEDURE — 36415 COLL VENOUS BLD VENIPUNCTURE: CPT | Mod: PO | Performed by: FAMILY MEDICINE

## 2025-02-07 PROCEDURE — 83036 HEMOGLOBIN GLYCOSYLATED A1C: CPT | Performed by: FAMILY MEDICINE

## 2025-02-07 PROCEDURE — 84153 ASSAY OF PSA TOTAL: CPT | Performed by: FAMILY MEDICINE

## 2025-02-18 ENCOUNTER — RESULTS FOLLOW-UP (OUTPATIENT)
Dept: FAMILY MEDICINE | Facility: CLINIC | Age: 65
End: 2025-02-18

## 2025-02-27 DIAGNOSIS — F51.02 INSOMNIA DUE TO PSYCHOLOGICAL STRESS: ICD-10-CM

## 2025-02-28 RX ORDER — TRAZODONE HYDROCHLORIDE 50 MG/1
50 TABLET ORAL NIGHTLY
Qty: 90 TABLET | Refills: 1 | Status: SHIPPED | OUTPATIENT
Start: 2025-02-28

## 2025-03-06 ENCOUNTER — PATIENT MESSAGE (OUTPATIENT)
Dept: ADMINISTRATIVE | Facility: HOSPITAL | Age: 65
End: 2025-03-06
Payer: MEDICARE

## 2025-03-24 RX ORDER — METFORMIN HYDROCHLORIDE 500 MG/1
500 TABLET, EXTENDED RELEASE ORAL
Qty: 90 TABLET | Refills: 1 | Status: SHIPPED | OUTPATIENT
Start: 2025-03-24

## 2025-03-24 NOTE — TELEPHONE ENCOUNTER
No care due was identified.  [facility] Embedded Care Due Messages. Reference number: [id number].   3/24/2025 2:24:50 AM CDT

## 2025-03-24 NOTE — TELEPHONE ENCOUNTER
Refill Decision Note   Gopi Garcia  is requesting a refill authorization.  Brief Assessment and Rationale for Refill:  Approve     Medication Therapy Plan:         Comments:     Note composed:3:07 PM 03/24/2025

## 2025-08-07 ENCOUNTER — LAB VISIT (OUTPATIENT)
Dept: LAB | Facility: HOSPITAL | Age: 65
End: 2025-08-07
Attending: FAMILY MEDICINE
Payer: MEDICARE

## 2025-08-07 DIAGNOSIS — E11.9 TYPE 2 DIABETES MELLITUS WITHOUT COMPLICATION, WITHOUT LONG-TERM CURRENT USE OF INSULIN: ICD-10-CM

## 2025-08-07 DIAGNOSIS — Z79.899 ENCOUNTER FOR LONG-TERM (CURRENT) USE OF OTHER MEDICATIONS: ICD-10-CM

## 2025-08-07 LAB
ALBUMIN SERPL BCP-MCNC: 4.2 G/DL (ref 3.5–5.2)
ALP SERPL-CCNC: 54 UNIT/L (ref 40–150)
ALT SERPL W/O P-5'-P-CCNC: 10 UNIT/L (ref 0–55)
ANION GAP (OHS): 10 MMOL/L (ref 8–16)
AST SERPL-CCNC: 21 UNIT/L (ref 0–50)
BILIRUB SERPL-MCNC: 1.4 MG/DL (ref 0.1–1)
BUN SERPL-MCNC: 12 MG/DL (ref 8–23)
CALCIUM SERPL-MCNC: 8.9 MG/DL (ref 8.7–10.5)
CHLORIDE SERPL-SCNC: 101 MMOL/L (ref 95–110)
CHOLEST SERPL-MCNC: 133 MG/DL (ref 120–199)
CHOLEST/HDLC SERPL: 3 {RATIO} (ref 2–5)
CO2 SERPL-SCNC: 30 MMOL/L (ref 23–29)
CREAT SERPL-MCNC: 1 MG/DL (ref 0.5–1.4)
EAG (OHS): 143 MG/DL (ref 68–131)
GFR SERPLBLD CREATININE-BSD FMLA CKD-EPI: >60 ML/MIN/1.73/M2
GLUCOSE SERPL-MCNC: 138 MG/DL (ref 70–110)
HBA1C MFR BLD: 6.6 % (ref 4–5.6)
HDLC SERPL-MCNC: 44 MG/DL (ref 40–75)
HDLC SERPL: 33.1 % (ref 20–50)
LDLC SERPL CALC-MCNC: 63.2 MG/DL (ref 63–159)
NONHDLC SERPL-MCNC: 89 MG/DL
POTASSIUM SERPL-SCNC: 3.9 MMOL/L (ref 3.5–5.1)
PROT SERPL-MCNC: 6.4 GM/DL (ref 6–8.4)
SODIUM SERPL-SCNC: 141 MMOL/L (ref 136–145)
TRIGL SERPL-MCNC: 129 MG/DL (ref 30–150)
VIT B12 SERPL-MCNC: 241 PG/ML (ref 210–950)

## 2025-08-07 PROCEDURE — 82607 VITAMIN B-12: CPT

## 2025-08-07 PROCEDURE — 82465 ASSAY BLD/SERUM CHOLESTEROL: CPT

## 2025-08-07 PROCEDURE — 82040 ASSAY OF SERUM ALBUMIN: CPT

## 2025-08-07 PROCEDURE — 83036 HEMOGLOBIN GLYCOSYLATED A1C: CPT

## 2025-08-07 PROCEDURE — 36415 COLL VENOUS BLD VENIPUNCTURE: CPT | Mod: PO

## 2025-08-11 ENCOUNTER — OFFICE VISIT (OUTPATIENT)
Dept: FAMILY MEDICINE | Facility: CLINIC | Age: 65
End: 2025-08-11
Payer: MEDICARE

## 2025-08-11 VITALS
SYSTOLIC BLOOD PRESSURE: 160 MMHG | DIASTOLIC BLOOD PRESSURE: 88 MMHG | WEIGHT: 267.69 LBS | BODY MASS INDEX: 33.28 KG/M2 | TEMPERATURE: 98 F | HEART RATE: 65 BPM | HEIGHT: 75 IN

## 2025-08-11 DIAGNOSIS — I15.2 HYPERTENSION ASSOCIATED WITH DIABETES: Primary | ICD-10-CM

## 2025-08-11 DIAGNOSIS — E78.5 HYPERLIPIDEMIA ASSOCIATED WITH TYPE 2 DIABETES MELLITUS: ICD-10-CM

## 2025-08-11 DIAGNOSIS — I25.10 MILD CAD: ICD-10-CM

## 2025-08-11 DIAGNOSIS — E11.59 HYPERTENSION ASSOCIATED WITH DIABETES: Primary | ICD-10-CM

## 2025-08-11 DIAGNOSIS — Z12.5 SCREENING FOR PROSTATE CANCER: ICD-10-CM

## 2025-08-11 DIAGNOSIS — E11.69 HYPERLIPIDEMIA ASSOCIATED WITH TYPE 2 DIABETES MELLITUS: ICD-10-CM

## 2025-08-11 DIAGNOSIS — I25.2 MYOCARDIAL INFARCT, OLD: ICD-10-CM

## 2025-08-11 DIAGNOSIS — F17.200 SMOKER: ICD-10-CM

## 2025-08-11 DIAGNOSIS — F33.0 MILD EPISODE OF RECURRENT MAJOR DEPRESSIVE DISORDER: ICD-10-CM

## 2025-08-11 DIAGNOSIS — E80.4 GILBERT'S SYNDROME: ICD-10-CM

## 2025-08-11 DIAGNOSIS — G47.33 OBSTRUCTIVE SLEEP APNEA SYNDROME: ICD-10-CM

## 2025-08-11 DIAGNOSIS — J30.9 ALLERGIC RHINITIS, UNSPECIFIED SEASONALITY, UNSPECIFIED TRIGGER: ICD-10-CM

## 2025-08-11 DIAGNOSIS — E11.9 TYPE 2 DIABETES MELLITUS WITHOUT COMPLICATION, WITHOUT LONG-TERM CURRENT USE OF INSULIN: ICD-10-CM

## 2025-08-11 PROCEDURE — G2211 COMPLEX E/M VISIT ADD ON: HCPCS | Mod: ,,, | Performed by: FAMILY MEDICINE

## 2025-08-11 PROCEDURE — 99214 OFFICE O/P EST MOD 30 MIN: CPT | Mod: S$PBB,,, | Performed by: FAMILY MEDICINE

## 2025-08-11 PROCEDURE — 99999 PR PBB SHADOW E&M-EST. PATIENT-LVL V: CPT | Mod: PBBFAC,,, | Performed by: FAMILY MEDICINE

## 2025-08-11 PROCEDURE — 99215 OFFICE O/P EST HI 40 MIN: CPT | Mod: PBBFAC,PO | Performed by: FAMILY MEDICINE

## 2025-08-11 RX ORDER — FEXOFENADINE HCL 180 MG/1
180 TABLET ORAL DAILY
COMMUNITY

## 2025-08-11 RX ORDER — BUPROPION HYDROCHLORIDE 300 MG/1
TABLET ORAL
COMMUNITY
End: 2025-08-11

## 2025-08-11 RX ORDER — LISINOPRIL AND HYDROCHLOROTHIAZIDE 20; 25 MG/1; MG/1
1 TABLET ORAL DAILY
Qty: 90 TABLET | Refills: 3 | Status: SHIPPED | OUTPATIENT
Start: 2025-08-11

## 2025-08-11 RX ORDER — LANOLIN ALCOHOL/MO/W.PET/CERES
1000 CREAM (GRAM) TOPICAL DAILY
COMMUNITY
Start: 2025-08-11

## 2025-08-11 RX ORDER — METFORMIN HYDROCHLORIDE 500 MG/1
500 TABLET, EXTENDED RELEASE ORAL DAILY
Qty: 90 TABLET | Refills: 3 | Status: SHIPPED | OUTPATIENT
Start: 2025-08-11

## 2025-08-11 RX ORDER — AMLODIPINE BESYLATE 10 MG/1
10 TABLET ORAL DAILY
Qty: 90 EACH | Refills: 0 | Status: SHIPPED | OUTPATIENT
Start: 2025-08-11

## 2025-08-19 ENCOUNTER — HOSPITAL ENCOUNTER (OUTPATIENT)
Dept: RADIOLOGY | Facility: HOSPITAL | Age: 65
Discharge: HOME | End: 2025-08-19
Attending: INTERNAL MEDICINE
Payer: MEDICARE

## 2025-08-19 DIAGNOSIS — Z87.891 PERSONAL HISTORY OF NICOTINE DEPENDENCE: ICD-10-CM

## 2025-08-19 DIAGNOSIS — J43.2 CENTRILOBULAR EMPHYSEMA: ICD-10-CM

## 2025-08-19 DIAGNOSIS — R91.1 PULMONARY NODULE, LEFT: ICD-10-CM

## 2025-08-19 PROCEDURE — 71271 CT THORAX LUNG CANCER SCR C-: CPT | Mod: TC,PO

## 2025-08-19 PROCEDURE — 71271 CT THORAX LUNG CANCER SCR C-: CPT | Mod: 26,,, | Performed by: RADIOLOGY

## 2025-08-20 ENCOUNTER — CLINICAL SUPPORT (OUTPATIENT)
Dept: SMOKING CESSATION | Facility: CLINIC | Age: 65
End: 2025-08-20

## 2025-08-20 ENCOUNTER — PATIENT MESSAGE (OUTPATIENT)
Dept: FAMILY MEDICINE | Facility: CLINIC | Age: 65
End: 2025-08-20
Payer: MEDICARE

## 2025-08-20 ENCOUNTER — PATIENT MESSAGE (OUTPATIENT)
Dept: SLEEP MEDICINE | Facility: CLINIC | Age: 65
End: 2025-08-20
Payer: MEDICARE

## 2025-08-20 DIAGNOSIS — F17.200 NICOTINE DEPENDENCE: Primary | ICD-10-CM

## 2025-08-20 PROCEDURE — 99999 PR PBB SHADOW E&M-EST. PATIENT-LVL II: CPT | Mod: PBBFAC,,,

## 2025-08-20 PROCEDURE — 99404 PREV MED CNSL INDIV APPRX 60: CPT | Mod: ,,,

## 2025-08-20 RX ORDER — IBUPROFEN 200 MG
1 TABLET ORAL DAILY
Qty: 28 PATCH | Refills: 0 | Status: SHIPPED | OUTPATIENT
Start: 2025-08-20

## 2025-08-20 RX ORDER — DIPHENHYDRAMINE HCL 25 MG
4 CAPSULE ORAL
Qty: 380 EACH | Refills: 0 | Status: SHIPPED | OUTPATIENT
Start: 2025-08-20

## 2025-08-20 RX ORDER — BUPROPION HYDROCHLORIDE 150 MG/1
150 TABLET, EXTENDED RELEASE ORAL 2 TIMES DAILY
Qty: 58 TABLET | Refills: 0 | Status: SHIPPED | OUTPATIENT
Start: 2025-08-20 | End: 2026-08-20

## 2025-08-20 RX ORDER — VARENICLINE TARTRATE 0.5 (11)-1
KIT ORAL
Qty: 53 TABLET | Refills: 0 | Status: SHIPPED | OUTPATIENT
Start: 2025-08-20

## 2025-08-21 ENCOUNTER — OFFICE VISIT (OUTPATIENT)
Dept: OPTOMETRY | Facility: CLINIC | Age: 65
End: 2025-08-21
Payer: MEDICARE

## 2025-08-21 ENCOUNTER — PATIENT MESSAGE (OUTPATIENT)
Dept: PSYCHIATRY | Facility: CLINIC | Age: 65
End: 2025-08-21
Payer: MEDICARE

## 2025-08-21 VITALS — HEIGHT: 75 IN | OXYGEN SATURATION: 97 % | BODY MASS INDEX: 33.2 KG/M2 | WEIGHT: 267 LBS

## 2025-08-21 DIAGNOSIS — H25.13 AGE-RELATED NUCLEAR CATARACT, BILATERAL: ICD-10-CM

## 2025-08-21 DIAGNOSIS — E11.9 TYPE 2 DIABETES MELLITUS WITHOUT RETINOPATHY: Primary | ICD-10-CM

## 2025-08-21 DIAGNOSIS — H04.123 BILATERAL DRY EYES: ICD-10-CM

## 2025-08-21 DIAGNOSIS — H52.4 MYOPIA WITH ASTIGMATISM AND PRESBYOPIA, BILATERAL: ICD-10-CM

## 2025-08-21 DIAGNOSIS — H52.13 MYOPIA WITH ASTIGMATISM AND PRESBYOPIA, BILATERAL: ICD-10-CM

## 2025-08-21 DIAGNOSIS — H52.203 MYOPIA WITH ASTIGMATISM AND PRESBYOPIA, BILATERAL: ICD-10-CM

## 2025-08-21 PROCEDURE — 92014 COMPRE OPH EXAM EST PT 1/>: CPT | Mod: S$PBB,,, | Performed by: OPTOMETRIST

## 2025-08-21 PROCEDURE — 99999 PR PBB SHADOW E&M-EST. PATIENT-LVL II: CPT | Mod: PBBFAC,,, | Performed by: OPTOMETRIST

## 2025-08-21 PROCEDURE — 92015 DETERMINE REFRACTIVE STATE: CPT | Mod: ,,, | Performed by: OPTOMETRIST

## 2025-08-21 PROCEDURE — 99212 OFFICE O/P EST SF 10 MIN: CPT | Mod: PBBFAC,PO | Performed by: OPTOMETRIST

## 2025-08-21 RX ORDER — LOTEPREDNOL ETABONATE 5 MG/ML
1 SUSPENSION/ DROPS OPHTHALMIC 4 TIMES DAILY
Qty: 5 ML | Refills: 1 | Status: SHIPPED | OUTPATIENT
Start: 2025-08-21 | End: 2026-08-21

## 2025-08-22 ENCOUNTER — PATIENT MESSAGE (OUTPATIENT)
Dept: PULMONOLOGY | Facility: CLINIC | Age: 65
End: 2025-08-22
Payer: MEDICARE

## 2025-08-22 DIAGNOSIS — F51.02 INSOMNIA DUE TO PSYCHOLOGICAL STRESS: ICD-10-CM

## 2025-08-22 RX ORDER — TRAZODONE HYDROCHLORIDE 50 MG/1
50 TABLET ORAL NIGHTLY
Qty: 90 TABLET | Refills: 1 | OUTPATIENT
Start: 2025-08-22

## 2025-08-25 ENCOUNTER — OFFICE VISIT (OUTPATIENT)
Dept: PULMONOLOGY | Facility: CLINIC | Age: 65
End: 2025-08-25
Payer: MEDICARE

## 2025-08-25 VITALS — HEIGHT: 75 IN | WEIGHT: 260 LBS | BODY MASS INDEX: 32.33 KG/M2

## 2025-08-25 DIAGNOSIS — E78.5 HYPERLIPIDEMIA ASSOCIATED WITH TYPE 2 DIABETES MELLITUS: ICD-10-CM

## 2025-08-25 DIAGNOSIS — E66.811 CLASS 1 OBESITY WITH SERIOUS COMORBIDITY AND BODY MASS INDEX (BMI) OF 32.0 TO 32.9 IN ADULT, UNSPECIFIED OBESITY TYPE: ICD-10-CM

## 2025-08-25 DIAGNOSIS — Z72.0 TOBACCO USE: ICD-10-CM

## 2025-08-25 DIAGNOSIS — E11.59 HYPERTENSION ASSOCIATED WITH DIABETES: ICD-10-CM

## 2025-08-25 DIAGNOSIS — F51.02 INSOMNIA DUE TO PSYCHOLOGICAL STRESS: ICD-10-CM

## 2025-08-25 DIAGNOSIS — I15.2 HYPERTENSION ASSOCIATED WITH DIABETES: ICD-10-CM

## 2025-08-25 DIAGNOSIS — R91.1 PULMONARY NODULE, LEFT: ICD-10-CM

## 2025-08-25 DIAGNOSIS — E11.69 HYPERLIPIDEMIA ASSOCIATED WITH TYPE 2 DIABETES MELLITUS: ICD-10-CM

## 2025-08-25 DIAGNOSIS — Z87.891 PERSONAL HISTORY OF NICOTINE DEPENDENCE: ICD-10-CM

## 2025-08-25 DIAGNOSIS — E11.9 TYPE 2 DIABETES MELLITUS WITHOUT COMPLICATION, WITHOUT LONG-TERM CURRENT USE OF INSULIN: ICD-10-CM

## 2025-08-25 DIAGNOSIS — F17.200 NICOTINE DEPENDENCE: ICD-10-CM

## 2025-08-25 DIAGNOSIS — J43.2 CENTRILOBULAR EMPHYSEMA: ICD-10-CM

## 2025-08-25 DIAGNOSIS — G47.33 OSA (OBSTRUCTIVE SLEEP APNEA): Primary | ICD-10-CM

## 2025-08-25 PROCEDURE — 98006 SYNCH AUDIO-VIDEO EST MOD 30: CPT | Mod: 95,,, | Performed by: INTERNAL MEDICINE

## 2025-08-25 RX ORDER — ZOLPIDEM TARTRATE 5 MG/1
5 TABLET ORAL NIGHTLY
Qty: 30 TABLET | Refills: 4 | Status: SHIPPED | OUTPATIENT
Start: 2025-08-25 | End: 2025-12-23

## 2025-08-25 RX ORDER — BUPROPION HYDROCHLORIDE 150 MG/1
150 TABLET, EXTENDED RELEASE ORAL 2 TIMES DAILY
Qty: 58 TABLET | Refills: 0 | Status: SHIPPED | OUTPATIENT
Start: 2025-08-25 | End: 2026-08-25

## 2025-08-25 RX ORDER — IBUPROFEN 200 MG
1 TABLET ORAL DAILY
Qty: 28 PATCH | Refills: 0 | Status: SHIPPED | OUTPATIENT
Start: 2025-08-25

## 2025-08-25 RX ORDER — DIPHENHYDRAMINE HCL 25 MG
4 CAPSULE ORAL
Qty: 380 EACH | Refills: 0 | Status: SHIPPED | OUTPATIENT
Start: 2025-08-25

## 2025-08-25 RX ORDER — TRAZODONE HYDROCHLORIDE 50 MG/1
50 TABLET ORAL NIGHTLY
Qty: 90 TABLET | Refills: 1 | Status: SHIPPED | OUTPATIENT
Start: 2025-08-25

## 2025-08-29 ENCOUNTER — PATIENT MESSAGE (OUTPATIENT)
Dept: ADMINISTRATIVE | Facility: HOSPITAL | Age: 65
End: 2025-08-29
Payer: MEDICARE

## 2025-09-02 ENCOUNTER — CLINICAL SUPPORT (OUTPATIENT)
Dept: SMOKING CESSATION | Facility: CLINIC | Age: 65
End: 2025-09-02
Payer: MEDICARE

## 2025-09-02 DIAGNOSIS — F17.200 NICOTINE DEPENDENCE: Primary | ICD-10-CM

## 2025-09-02 PROCEDURE — 99212 OFFICE O/P EST SF 10 MIN: CPT | Mod: PBBFAC,PO

## 2025-09-02 PROCEDURE — 99999 PR PBB SHADOW E&M-EST. PATIENT-LVL II: CPT | Mod: PBBFAC,,,
